# Patient Record
Sex: MALE | Race: WHITE | NOT HISPANIC OR LATINO | Employment: OTHER | URBAN - NONMETROPOLITAN AREA
[De-identification: names, ages, dates, MRNs, and addresses within clinical notes are randomized per-mention and may not be internally consistent; named-entity substitution may affect disease eponyms.]

---

## 2023-02-07 ENCOUNTER — OFFICE VISIT (OUTPATIENT)
Dept: INTERNAL MEDICINE | Facility: CLINIC | Age: 73
End: 2023-02-07
Payer: MEDICARE

## 2023-02-07 ENCOUNTER — LAB (OUTPATIENT)
Dept: LAB | Facility: HOSPITAL | Age: 73
End: 2023-02-07
Payer: MEDICARE

## 2023-02-07 VITALS
DIASTOLIC BLOOD PRESSURE: 60 MMHG | BODY MASS INDEX: 17.41 KG/M2 | OXYGEN SATURATION: 99 % | HEART RATE: 52 BPM | SYSTOLIC BLOOD PRESSURE: 92 MMHG | WEIGHT: 131.4 LBS | HEIGHT: 73 IN | RESPIRATION RATE: 16 BRPM | TEMPERATURE: 97.1 F

## 2023-02-07 DIAGNOSIS — G47.00 INSOMNIA, UNSPECIFIED TYPE: ICD-10-CM

## 2023-02-07 DIAGNOSIS — K21.9 GASTROESOPHAGEAL REFLUX DISEASE WITHOUT ESOPHAGITIS: ICD-10-CM

## 2023-02-07 DIAGNOSIS — Z12.5 PROSTATE CANCER SCREENING: ICD-10-CM

## 2023-02-07 DIAGNOSIS — F41.9 ANXIETY AND DEPRESSION: ICD-10-CM

## 2023-02-07 DIAGNOSIS — R34 DECREASED URINE OUTPUT: ICD-10-CM

## 2023-02-07 DIAGNOSIS — F32.A ANXIETY AND DEPRESSION: ICD-10-CM

## 2023-02-07 DIAGNOSIS — E03.9 ACQUIRED HYPOTHYROIDISM: ICD-10-CM

## 2023-02-07 DIAGNOSIS — M19.042 ARTHRITIS OF BOTH HANDS: ICD-10-CM

## 2023-02-07 DIAGNOSIS — C15.9 SQUAMOUS CELL CARCINOMA, ESOPHAGUS: ICD-10-CM

## 2023-02-07 DIAGNOSIS — H53.9 VISUAL CHANGES: ICD-10-CM

## 2023-02-07 DIAGNOSIS — M19.041 ARTHRITIS OF BOTH HANDS: ICD-10-CM

## 2023-02-07 DIAGNOSIS — R13.10 DYSPHAGIA, UNSPECIFIED TYPE: Primary | ICD-10-CM

## 2023-02-07 LAB
ALBUMIN SERPL-MCNC: 4.4 G/DL (ref 3.5–5.2)
ALBUMIN/GLOB SERPL: 2.3 G/DL
ALP SERPL-CCNC: 58 U/L (ref 39–117)
ALT SERPL W P-5'-P-CCNC: 22 U/L (ref 1–41)
ANION GAP SERPL CALCULATED.3IONS-SCNC: 5 MMOL/L (ref 5–15)
AST SERPL-CCNC: 23 U/L (ref 1–40)
BILIRUB SERPL-MCNC: 0.4 MG/DL (ref 0–1.2)
BILIRUB UR QL STRIP: NEGATIVE
BUN SERPL-MCNC: 10 MG/DL (ref 8–23)
BUN/CREAT SERPL: 20.8 (ref 7–25)
CALCIUM SPEC-SCNC: 9.4 MG/DL (ref 8.6–10.5)
CHLORIDE SERPL-SCNC: 95 MMOL/L (ref 98–107)
CLARITY UR: ABNORMAL
CO2 SERPL-SCNC: 31 MMOL/L (ref 22–29)
COLOR UR: YELLOW
CREAT SERPL-MCNC: 0.48 MG/DL (ref 0.76–1.27)
DEPRECATED RDW RBC AUTO: 45.2 FL (ref 37–54)
EGFRCR SERPLBLD CKD-EPI 2021: 108.4 ML/MIN/1.73
ERYTHROCYTE [DISTWIDTH] IN BLOOD BY AUTOMATED COUNT: 12.9 % (ref 12.3–15.4)
GLOBULIN UR ELPH-MCNC: 1.9 GM/DL
GLUCOSE SERPL-MCNC: 92 MG/DL (ref 65–99)
GLUCOSE UR STRIP-MCNC: NEGATIVE MG/DL
HCT VFR BLD AUTO: 37.5 % (ref 37.5–51)
HGB BLD-MCNC: 13.1 G/DL (ref 13–17.7)
HGB UR QL STRIP.AUTO: NEGATIVE
KETONES UR QL STRIP: NEGATIVE
LEUKOCYTE ESTERASE UR QL STRIP.AUTO: NEGATIVE
MCH RBC QN AUTO: 34.2 PG (ref 26.6–33)
MCHC RBC AUTO-ENTMCNC: 34.9 G/DL (ref 31.5–35.7)
MCV RBC AUTO: 97.9 FL (ref 79–97)
NITRITE UR QL STRIP: NEGATIVE
PH UR STRIP.AUTO: 7.5 [PH] (ref 5–8)
PLATELET # BLD AUTO: 215 10*3/MM3 (ref 140–450)
PMV BLD AUTO: 8.4 FL (ref 6–12)
POTASSIUM SERPL-SCNC: 4.4 MMOL/L (ref 3.5–5.2)
PROT SERPL-MCNC: 6.3 G/DL (ref 6–8.5)
PROT UR QL STRIP: NEGATIVE
PSA SERPL-MCNC: 0.38 NG/ML (ref 0–4)
RBC # BLD AUTO: 3.83 10*6/MM3 (ref 4.14–5.8)
SODIUM SERPL-SCNC: 131 MMOL/L (ref 136–145)
SP GR UR STRIP: 1.01 (ref 1–1.03)
TSH SERPL DL<=0.05 MIU/L-ACNC: 3.27 UIU/ML (ref 0.27–4.2)
UROBILINOGEN UR QL STRIP: ABNORMAL
WBC NRBC COR # BLD: 3.66 10*3/MM3 (ref 3.4–10.8)

## 2023-02-07 PROCEDURE — 36415 COLL VENOUS BLD VENIPUNCTURE: CPT

## 2023-02-07 PROCEDURE — 84443 ASSAY THYROID STIM HORMONE: CPT

## 2023-02-07 PROCEDURE — 80053 COMPREHEN METABOLIC PANEL: CPT

## 2023-02-07 PROCEDURE — 85027 COMPLETE CBC AUTOMATED: CPT

## 2023-02-07 PROCEDURE — G0103 PSA SCREENING: HCPCS

## 2023-02-07 PROCEDURE — 81003 URINALYSIS AUTO W/O SCOPE: CPT

## 2023-02-07 PROCEDURE — 99204 OFFICE O/P NEW MOD 45 MIN: CPT | Performed by: NURSE PRACTITIONER

## 2023-02-07 RX ORDER — ATORVASTATIN CALCIUM 20 MG/1
20 TABLET, FILM COATED ORAL DAILY
COMMUNITY

## 2023-02-07 RX ORDER — CLONAZEPAM 0.5 MG/1
0.5 TABLET ORAL 2 TIMES DAILY PRN
COMMUNITY
End: 2023-02-07

## 2023-02-07 RX ORDER — VENLAFAXINE 75 MG/1
75 TABLET ORAL DAILY
COMMUNITY
End: 2023-03-22 | Stop reason: DRUGHIGH

## 2023-02-07 RX ORDER — LEVOTHYROXINE SODIUM 0.05 MG/1
50 TABLET ORAL DAILY
COMMUNITY

## 2023-02-07 RX ORDER — ZOLPIDEM TARTRATE 10 MG/1
10 TABLET ORAL NIGHTLY PRN
COMMUNITY
End: 2023-02-07 | Stop reason: SDUPTHER

## 2023-02-07 RX ORDER — DONEPEZIL HYDROCHLORIDE 5 MG/1
5 TABLET, FILM COATED ORAL NIGHTLY
COMMUNITY

## 2023-02-07 RX ORDER — AMLODIPINE BESYLATE 5 MG/1
5-10 TABLET ORAL DAILY
COMMUNITY

## 2023-02-07 RX ORDER — ZOLPIDEM TARTRATE 10 MG/1
10 TABLET ORAL NIGHTLY PRN
Qty: 30 TABLET | Refills: 3 | Status: SHIPPED | OUTPATIENT
Start: 2023-02-07

## 2023-02-07 NOTE — PROGRESS NOTES
CC: establish care     HPI     Fortino Dennis is a 72 y.o. male presents to [facility].     He has was diagnosed with squamous cell carcinoma of the esophagus. He underwent radiation and chemotherapy with lymph node excision in 2007. He was seeing Oncology every 3 months in [place]. 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 He continues to have difficulty swallowing and chewing. He eats soft foods. A feeding tube was placed in 2021 and he continues tube feedings about 6 times per day. A LINX reflux management system placed 2016 for GERD and has had several esophageal dilations in the past. He has a history of anxiety and depression. He was seen by neurology and underwent testing for memory issues. He has been on aricept for a year. He recently moved to [place] to be near his brother, he is grew up in the area.          ROS:  Review of Systems   Constitutional: Negative for fever.   HENT: Negative.    Eyes: Positive for visual disturbance.   Respiratory: Negative.    Cardiovascular: Negative.    Gastrointestinal: Negative.    Genitourinary: Positive for difficulty urinating. Negative for dysuria, flank pain, frequency and hematuria.   Musculoskeletal: Negative.    Skin: Negative.    Neurological: Positive for dizziness.   Psychiatric/Behavioral: Positive for dysphoric mood. Negative for sleep disturbance. The patient is nervous/anxious.           reports that he has never smoked. He has never used smokeless tobacco. He reports current alcohol use of about 3.0 standard drinks per week.    Current Outpatient Medications:   •  Acetaminophen (TYLENOL ARTHRITIS PAIN PO), Take  by mouth., Disp: , Rfl:   •  amLODIPine (NORVASC) 5 MG tablet, Take 5-10 mg by mouth Daily., Disp: , Rfl:   •  atorvastatin (LIPITOR) 20 MG tablet, Take 20 mg by mouth Daily., Disp: , Rfl:   •  donepezil (ARICEPT) 5 MG tablet, Take 5 mg by mouth Every Night., Disp: , Rfl:   •  levothyroxine (SYNTHROID, LEVOTHROID) 50 MCG  "tablet, Take 50 mcg by mouth Daily., Disp: , Rfl:   •  SENNA PO, Take  by mouth Daily., Disp: , Rfl:   •  venlafaxine (EFFEXOR) 75 MG tablet, Take 75 mg by mouth Daily., Disp: , Rfl:   •  zolpidem (Ambien) 10 MG tablet, Take 10 mg by mouth At Night As Needed for Sleep., Disp: , Rfl:     OBJECTIVE:  BP 92/60 (BP Location: Left arm, Patient Position: Sitting, Cuff Size: Adult)   Pulse 52   Temp 97.1 °F (36.2 °C) (Temporal)   Resp 16   Ht 185.4 cm (73\")   Wt 59.6 kg (131 lb 6.4 oz)   SpO2 99%   BMI 17.34 kg/m²    Physical Exam  Constitutional:       General: He is not in acute distress.     Appearance: He is ill-appearing.   Cardiovascular:      Rate and Rhythm: Normal rate and regular rhythm.      Heart sounds: Normal heart sounds.   Pulmonary:      Breath sounds: Normal breath sounds.   Abdominal:      General: Bowel sounds are normal.      Comments: Feeding tube in place   Neurological:      General: No focal deficit present.   Psychiatric:         Mood and Affect: Mood normal.         Behavior: Behavior normal.         Thought Content: Thought content normal.         Judgment: Judgment normal.         ASSESSMENT/PLAN:     Diagnoses and all orders for this visit:    1. Dysphagia, unspecified type (Primary)  2. Gastroesophageal reflux disease without esophagitis  -     Ambulatory Referral to Gastroenterology    3. Decreased urine output  -     CBC No Differential; Future  -     Comprehensive metabolic panel; Future  -     Urinalysis With Culture If Indicated - Urine, Clean Catch; Future  He mentions urinary output has been decreased, no burning or pain with urinating.     4. Prostate cancer screening  -     PSA SCREENING; Future  Will obtain PSA given urinary symptoms.   5. Visual changes  -     Ambulatory Referral to Ophthalmology  He has a history of cataracts and multiple eye surgeries. He continues to have some visual disturbance.     6. Acquired hypothyroidism  -     TSH; Future    7. Squamous cell " carcinoma, esophagus (HCC)  -     Ambulatory Referral to Oncology   Records requested.     8. Anxiety and depression  Continue effexor. Discussed long-term effects with benzos including memory issues. He will continue to use this sparingly.     9. Arthritis of both hands  -     Ambulatory Referral to Orthopedic Surgery  He was getting injections to wrist and hands every 3 months for arthritis.     10. Insomnia    Ambien refilled PDMP reviewed, no concerns.     An After Visit Summary was printed and given to the patient at discharge.  Return in about 6 weeks (around 3/21/2023) for Recheck, return in 1 week for BP recheck .          Celestina Huang, JONATHAN 2/7/2023   Electronically signed.

## 2023-02-09 ENCOUNTER — TELEPHONE (OUTPATIENT)
Dept: INTERNAL MEDICINE | Facility: CLINIC | Age: 73
End: 2023-02-09

## 2023-02-09 DIAGNOSIS — G47.00 INSOMNIA, UNSPECIFIED TYPE: ICD-10-CM

## 2023-02-09 RX ORDER — ZOLPIDEM TARTRATE 10 MG/1
10 TABLET ORAL NIGHTLY PRN
Qty: 30 TABLET | Refills: 3 | OUTPATIENT
Start: 2023-02-09

## 2023-02-09 NOTE — TELEPHONE ENCOUNTER
Caller: Fortino Dennis    Relationship: Self    Best call back number:  [phone]    Requested Prescriptions:   Requested Prescriptions     Pending Prescriptions Disp Refills   • zolpidem (Ambien) 10 MG tablet 30 tablet 3     Sig: Take 1 tablet by mouth At Night As Needed for Sleep.      Pharmacy where request should be sent:      [id number] [place] [facility] [address]  - [phone] [facility] [phone]   [phone]    Additional details provided by patient:  HAS ABOUT A 7 DAY SUPPLY LEFT    Does the patient have less than a 3 day supply:  [] Yes  [x] No    Would you like a call back once the refill request has been completed: [x] Yes [] No    If the office needs to give you a call back, can they leave a voicemail: [x] Yes [] No    Gamaliel Pérez Rep   02/09/23 09:13 CST

## 2023-02-14 ENCOUNTER — CLINICAL SUPPORT (OUTPATIENT)
Dept: INTERNAL MEDICINE | Facility: CLINIC | Age: 73
End: 2023-02-14
Payer: MEDICARE

## 2023-02-14 VITALS
RESPIRATION RATE: 16 BRPM | HEART RATE: 55 BPM | WEIGHT: 131 LBS | BODY MASS INDEX: 17.36 KG/M2 | HEIGHT: 73 IN | OXYGEN SATURATION: 98 % | DIASTOLIC BLOOD PRESSURE: 80 MMHG | SYSTOLIC BLOOD PRESSURE: 136 MMHG

## 2023-02-14 DIAGNOSIS — I10 HYPERTENSION, UNSPECIFIED TYPE: Primary | ICD-10-CM

## 2023-02-14 NOTE — PROGRESS NOTES
I have reviewed the notes, assessments, and/or procedures performed by Aaron Mason, I concur with her/his documentation of Fortino Dennis.

## 2023-02-14 NOTE — PROGRESS NOTES
"Chief complaint: F/u hypertension with BP Check    History:  Fortino Dennis is a 72 y.o. male who presents today for BP check for hypertension.    OBJECTIVE:  /80 (BP Location: Left arm, Patient Position: Sitting, Cuff Size: Adult) Comment: Manual  Pulse 55   Resp 16   Ht 185.4 cm (73\")   Wt 59.4 kg (131 lb)   SpO2 98%   BMI 17.28 kg/m²     Assessment/Plan    Diagnoses and all orders for this visit:    1. Hypertension, unspecified type (Primary)        An After Visit Summary was printed and given to the patient at discharge.  Follow up as scheduled. Sooner if problems arise.         Celestina Huang APRN  2/14/2023   "

## 2023-03-13 NOTE — PROGRESS NOTES
[facility] HEMATOLOGY & ONCOLOGY [address]  [facility] [address] [phone]    Patient Name: Fortino Dennis  Encounter Date: 03/15/2023  YOB: 1950  Patient Number: [id number]    Initial Note    REASON FOR CONSULTATION: Fortino Dennis is a pleasant 72 y.o. 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  He had undergone left neck dissection after neoadjuvant chemo with radiation, 33 fractions in [facility]. .  Details are not available.  He is seen with brother, Geovanny. History is obtained from patient. History is considered to be accurate.      HISTORY OF PRESENT ILLNESS: He presented with a left neck mass 05/2007 by Dr. Galicia.  He had undergone neoadjuvant chemo with radiation 33 fractions.   He had undergone left neck dissection.    Fine-needle aspiration 5/31/2007.    Pathology report 6/1/2007 from [facility] showed squamous cell carcinoma.    Colonoscopy by Dr. Imtiaz Summers 3/21/2014.  A few small mouth diverticula were found in the sigmoid colon.  The exam was otherwise without abnormality on direct and retroflexion views.    Pathology report 9/12/2017.  Gastric fundic mucosa and cardiac mucosa showed no dysplasia.    He had seen Dr. Fer Muhammad [facility] 9/26/2017 for gastroesophageal reflux disease, hiatal hernia and esophagitis.     Pathology report 10/20/2017 mediastinal mass excision.  Benign fibroadipose tissue.  8 small benign lymph nodes.    He had undergone endoscopy with dilatation and biopsy.  Findings showed normal duodenum.  Mild diffuse gastritis, biopsy taken from antrum and incisura.  Some apparent irregularity of the gastric cardia by the GE junction.  This was biopsied several times for pathology.  3 to 4 cm hiatal hernia.  GE junction at 43 cm.  Distal esophagitis class I or grade  A.    He had undergone placement of LINX antireflux device on 10/19/2017 by Dr. Fer Muhammad, [facility]. Patient referred to oncology.    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    He had undergone left segmental mandibulectomy.  Osteotomy and plate reconstruction of mandible.  Local flap reconstruction, oral cavity approximately 12 cm² dissection in the background of prior oral cancer treatment with radiation necrosis of mandible on 4/19/2021.     Pathology report 4/26/2021.  Acute osteomyelitis with bone resorption and involvement of the posterior surgical margin.    He had undergone laryngoscopy, esophagoscopy, flexible bronchoscopy and dilation of esophageal stricture 11/17/2021.    Patient seen by JONATHAN Schmidt 2/14/2023 for hypertension.  He continued to have chewing difficulty and swallowing difficulty.  He had required feeding tube in 2001.  Patient referred to oncology.    LABS    Lab Results - Last 18 Months   Lab Units 02/07/23  1137 07/27/22  0705   HEMOGLOBIN g/dL 13.1 13.9   HEMATOCRIT % 37.5 41.2   MCV fL 97.9* 99.8*   WBC 10*3/mm3 3.66 5.1   RDW % 12.9  --    MPV fL 8.4 8.1*   PLATELETS 10*3/mm3 215 228   NEUTROS ABS K/uL  --  3.83   LYMPHS ABS K/uL  --  0.31*   MONOS ABS K/uL  --  0.77   EOS ABS K/uL  --  0.15   BASOS ABS K/uL  --  0.05   NRBC   --  AUTO DIFF       Lab Results - Last 18 Months   Lab Units 02/07/23  1137 07/27/22  0705 01/05/22  0649 11/17/21  0610   GLUCOSE mg/dL 92  --   --   --    SODIUM mmol/L 131* 127* 137 135*   POTASSIUM mmol/L 4.4 4.4 4.1 4.2   TOTAL CO2 meq/L  --  27 29 29   CO2 mmol/L 31.0*  --   --   --    CHLORIDE mmol/L 95* 97* 102 101   ANION GAP mmol/L 5.0 3* 6 5*   CREATININE mg/dL 0.48* 0.50* 0.38* 0.44*   BUN mg/dL 10 14 10 10   BUN / CREAT RATIO  20.8 28* 26* 23   CALCIUM mg/dL 9.4 9.3 8.8 8.8   ALK PHOS U/L 58  --   --   --    TOTAL PROTEIN g/dL 6.3  --   --   --    ALT (SGPT) U/L 22  --   --   --    AST (SGOT) U/L  23  --   --   --    BILIRUBIN mg/dL 0.4  --   --   --    ALBUMIN g/dL 4.4  --   --   --    GLOBULIN gm/dL 1.9  --   --   --        No results for input(s): MSPIKE, KAPPALAMB, IGLFLC, URICACID, FREEKAPPAL, CEA, LDH, REFLABREPO in the last 29222 hours.    Lab Results - Last 18 Months   Lab Units 02/07/23  1137   TSH uIU/mL 3.270         PAST MEDICAL HISTORY:  ALLERGIES:  No Known Allergies  CURRENT MEDICATIONS:  Outpatient Encounter Medications as of 3/15/2023   Medication Sig Dispense Refill   • Acetaminophen (TYLENOL ARTHRITIS PAIN PO) Take  by mouth.     • amLODIPine (NORVASC) 5 MG tablet Take 1-2 tablets by mouth Daily.     • atorvastatin (LIPITOR) 20 MG tablet Take 1 tablet by mouth Daily.     • clonazePAM (KlonoPIN) 1 MG tablet Take 1 tablet by mouth 2 (Two) Times a Day As Needed.     • donepezil (ARICEPT) 5 MG tablet Take 1 tablet by mouth Every Night.     • levothyroxine (SYNTHROID, LEVOTHROID) 50 MCG tablet Take 1 tablet by mouth Daily.     • SENNA PO Take  by mouth Daily.     • venlafaxine (EFFEXOR) 75 MG tablet Take 1 tablet by mouth Daily.     • zolpidem (Ambien) 10 MG tablet Take 1 tablet by mouth At Night As Needed for Sleep. 30 tablet 3     No facility-administered encounter medications on file as of 3/15/2023.     ADULT ILLNESSES:  Patient Active Problem List   Diagnosis Code   • Hypertensive disorder I10     SURGERIES:  No past surgical history on file.  HEALTH MAINTENANCE ITEMS:  Health Maintenance Due   Topic Date Due   • COVID-19 Vaccine (1) Never done   • TDAP/TD VACCINES (1 - Tdap) Never done   • ZOSTER VACCINE (1 of 2) Never done   • Pneumococcal Vaccine 65+ (1 - PCV) Never done   • HEPATITIS C SCREENING  Never done   • ANNUAL WELLNESS VISIT  Never done   • LIPID PANEL  Never done       <no information>  Last Completed Colonoscopy          COLORECTAL CANCER SCREENING (COLONOSCOPY - Every 10 Years) Next due on 9/17/2031 09/17/2021  Outside Procedure: [place] COLORECTAL SCRN; HI RISK IND     "03/21/2014  SCANNED - COLONOSCOPY              Immunization History   Administered Date(s) Administered   • Influenza, Unspecified 10/11/2022     Last Completed Mammogram     This patient has no relevant Health Maintenance data.            FAMILY HISTORY:  Family History   Problem Relation Age of Onset   • Cancer Mother    • Hypertension Mother    • Dementia Mother    • Colon cancer Mother    • Colon polyps Neg Hx      SOCIAL HISTORY:  Social History     Socioeconomic History   • Marital status: Single   Tobacco Use   • Smoking status: Never   • Smokeless tobacco: Never   Vaping Use   • Vaping Use: Never used   Substance and Sexual Activity   • Alcohol use: Yes     Alcohol/week: 3.0 standard drinks     Types: 3 Cans of beer per week     Comment: daily beers   • Drug use: Not Currently   • Sexual activity: Not Currently     Partners: Female       REVIEW OF SYSTEMS:  Review of Systems   Constitutional: Positive for fatigue. Negative for fever and unexpected weight loss.        \"Just old age.\"   HENT: Negative for congestion and mouth sores.         \"Still have chewing issues.\"   Eyes: Negative for discharge and redness.   Respiratory: Negative for cough, shortness of breath and wheezing.    Cardiovascular: Negative for chest pain and palpitations.   Gastrointestinal: Negative for abdominal pain, nausea and vomiting.   Endocrine: Negative for polydipsia and polyphagia.   Genitourinary: Negative for difficulty urinating, dysuria and flank pain.   Musculoskeletal: Negative for gait problem and joint swelling.   Skin: Positive for pallor.   Allergic/Immunologic: Negative for food allergies.   Neurological: Negative for speech difficulty, weakness and confusion.   Hematological: Negative for adenopathy. Does not bruise/bleed easily.   Psychiatric/Behavioral: Negative for agitation and hallucinations. The patient is not nervous/anxious.        /64   Pulse 63   Temp 97.3 °F (36.3 °C)   Resp 18   Ht 185.4 cm (73\")  "  Wt 62.6 kg (138 lb)   SpO2 98%   BMI 18.21 kg/m²  Body surface area is 1.84 meters squared.  Pain Score    03/15/23 1031   PainSc: 0-No pain       Physical Exam:  Physical Exam  Vitals reviewed.   Constitutional:       General: He is not in acute distress.  HENT:      Head: Normocephalic and atraumatic.   Eyes:      General: No scleral icterus.  Neck:      Comments: Scars, bilateral neck.  Cardiovascular:      Rate and Rhythm: Normal rate.   Pulmonary:      Effort: No respiratory distress.      Breath sounds: No wheezing or rales.   Abdominal:      General: Bowel sounds are normal.      Palpations: Abdomen is soft.      Tenderness: There is no abdominal tenderness.      Comments: +PEG.    Musculoskeletal:         General: No swelling.      Cervical back: Neck supple.   Skin:     General: Skin is warm.      Coloration: Skin is pale.   Neurological:      Mental Status: He is alert and oriented to person, place, and time.   Psychiatric:         Mood and Affect: Mood normal.         Behavior: Behavior normal.         Thought Content: Thought content normal.         Judgment: Judgment normal.         Fortino KEANE Sonny reports a pain score of 0.           ASSESSMENT:  1.  Squamous cell carcinoma, base of the tongue.  Diagnosed in 2007.  AJCC stage: Unknown.  Treatment status: Post neck dissection and adjuvant radiation.  2.  Performance status of 1.  3.  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  4.  Dysphagia.  On tube feeding since 2001.      PLAN:  1.   regarding the reason for the referral  2.   regarding role of surveillance for carcinoma base of tongue diagnosed in 2007.  He had completed neoadjuvant chemo with radiation, 33 fractions in 2007.   3.  Blood for CBC with differential, TSH and CMP.  4.  Keep appointment with ENT for surveillance.  5.  Keep appointment with JONATHAN Reynolds from GI.  6.  Order CT neck and chest, squamous cell carcinoma base of tongue.  7.  Continue  care per primary care physician at the specialist  8.  Plan of care discussed with patient and brother.  Understand expressed with patient agreeable to proceed  9.  Advance Care Planning   ACP discussion was held with the patient during this visit. Patient has an advance directive (not in EMR), copy requested.    10.  He will be seen every 12 months.  11.  Return to office in 12 months with CBC with differential, CMP and TSH.      I have reviewed the assessment and plan and verified the accuracy of it. No changes to assessment and plan since the information was documented. Chandler Gallardo MD 03/15/23       I spent 62 total minutes, face-to-face, caring for Fortino arenas.  Greater than 50% of this time involved counseling and/or coordination of care as documented within this note regarding the patient's illness(es), pros and cons of various treatment options, instructions and/or risk reduction.        (Channing Mcmanus MD)  (JONATHAN Reynolds)  (JONATHAN Schmidt)

## 2023-03-14 ENCOUNTER — OFFICE VISIT (OUTPATIENT)
Dept: GASTROENTEROLOGY | Facility: CLINIC | Age: 73
End: 2023-03-14
Payer: MEDICARE

## 2023-03-14 VITALS
BODY MASS INDEX: 18.29 KG/M2 | DIASTOLIC BLOOD PRESSURE: 78 MMHG | SYSTOLIC BLOOD PRESSURE: 126 MMHG | HEIGHT: 73 IN | OXYGEN SATURATION: 97 % | WEIGHT: 138 LBS | HEART RATE: 61 BPM | TEMPERATURE: 98 F

## 2023-03-14 DIAGNOSIS — R13.13 PHARYNGEAL DYSPHAGIA: ICD-10-CM

## 2023-03-14 DIAGNOSIS — C14.0 PHARYNGEAL CANCER: Primary | ICD-10-CM

## 2023-03-14 PROCEDURE — 1160F RVW MEDS BY RX/DR IN RCRD: CPT | Performed by: NURSE PRACTITIONER

## 2023-03-14 PROCEDURE — 99213 OFFICE O/P EST LOW 20 MIN: CPT | Performed by: NURSE PRACTITIONER

## 2023-03-14 PROCEDURE — 1159F MED LIST DOCD IN RCRD: CPT | Performed by: NURSE PRACTITIONER

## 2023-03-14 PROCEDURE — 3078F DIAST BP <80 MM HG: CPT | Performed by: NURSE PRACTITIONER

## 2023-03-14 PROCEDURE — 3074F SYST BP LT 130 MM HG: CPT | Performed by: NURSE PRACTITIONER

## 2023-03-14 RX ORDER — CLONAZEPAM 1 MG/1
1 TABLET ORAL 2 TIMES DAILY PRN
COMMUNITY
End: 2023-03-22

## 2023-03-14 NOTE — PROGRESS NOTES
"Chief Complaint   Patient presents with   • GI Problem     Having problems swallowing        PCP: Celestina Ortez APRN  REFER: Celestina Ortez, *    Subjective     HPI    Fortino Dennis referred to office for complain of dysphagia.  He has a history of pharyngeal cancer and is s/p chemo and radiation.    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  Dental implants 5/2021.   He has a LINX reflux mgmt system placed in 2016 for control of GERD related symptoms.  Today, Fortino Dennis denies abdominal pain, no signs of GI blood loss.  Bowels move without difficulty.  GERD related symptoms controlled.   PEG tube replaced last year (surgical placement per patient).  He does take food/liquid by mouth and uses PEG for supplemental.    He has undergone dysphagia therapy in past for treatment of pharyngeal dysphagia.  Fortino Dennis presents today to establish care (recently moved to [facility]) and set up dysphagia therapy.  He reports difficulty swallowing similar to what he has experienced in past.  He complain of pain to his hands (referral to [facility] by PCP)    CARE EVERYWHERE   \"This 72 year old male is followed by Dr. Gailcia for a history of T1 pharyngeal squamous cell carcinoma, for which neck dissection and chemoradiation was performed in 2007. Following completion of his treatment, he experienced dysphagia, for which he participated in 1 course of dysphagia therapy with use of neuromuscular electrical stimulation in 2008. Endoscopic examination of swallowing (FEES) 11/03/08 revealed a mild pharyngeal dysphagia and he was discharged from dysphagia therapy cleared for a soft diet. He has done well from the oncologic perspective, with no evidence of recurrence in the head and neck region since that time. FEES 5/18/17 indicated a decline in swallow function, and he reinitiated dysphagia therapy for 2 full courses. The pt. developed mandibular bone erosion and reconstruction " "of the mandible, and right fibula osteocutaneous free flap was performed 05/19/21. Pathology was osteomyelitis and osteonecrosis with benign lymph nodes. He completed IV antibiotics and hyperbaric oxygen therapy. FEES 09/24/21 revealed a moderate-severe pharyngeal dysphagia. He was cleared for a soft diet with intake modifications and re-initiated therapy with NMES. He is followed by Dr. Pearce for dental rehabilitation and is awaiting permanent dentition pending an improved oral aperture. He was placed on the Therabite therapeutic protocol. Pt. has completed multiple courses of dysphagia therapy and had multiple esophageal dilations with most recent being on 7/27/22 (52 Fr). His recent surveillance scans on 7/27/22 were unremarkable. FEES 08/16/22 revealed slightly worsening moderate pharyngeal dysphagia due to generalized weakness from a recent URI and he was cleared to continue a soft diet with intake modifications. Pt. has completed another course of dysphagia therapy. FEES 10/03/22 revealed an improving moderate pharyngeal dysphagia. Pt. is cleared to continue a soft, cohesive diet with intake modifications. Pt. was advised to follow-up with the Neurologist as scheduled and continue office based dysphagia therapy via NMES. He has completed another course. According to the pt, he has had increased left jaw pain and is scheduled for a CT scan in the near future. FEES 11/21/22 revealed a stable moderate pharyngeal dysphagia. He is cleared to continue a soft, cohesive diet with intake modifications. Pt. was advised to f/u w/ Neurologist as scheduled. He is agreeable to continue office-based dysphagia therapy via NMES. FEESST p 15 txs. Pt seen today for his scheduled dysphagia therapy in light of presenting history and current status.\"    ENDOSCOPY, INT (09/11/2017)    SCANNED - COLONOSCOPY (03/21/2014)-diverticulosis (7 years)    Past Medical History:   Diagnosis Date   • Acquired hypothyroidism    • Anxiety    • " Arthritis    • Depression    • Essential hypertension    • GERD (gastroesophageal reflux disease)    • Hiatal hernia    • Hyperlipidemia    • Hypertension    • Memory problem     aricept   • Mixed hyperlipidemia    • Osteonecrosis of jaw (HCC)    • Throat cancer (HCC)    • Thyroid disorder        History reviewed. No pertinent surgical history.    Outpatient Medications Marked as Taking for the 3/14/23 encounter (Office Visit) with Varinder Soliman APRN   Medication Sig Dispense Refill   • Acetaminophen (TYLENOL ARTHRITIS PAIN PO) Take  by mouth.     • amLODIPine (NORVASC) 5 MG tablet Take 1-2 tablets by mouth Daily.     • atorvastatin (LIPITOR) 20 MG tablet Take 1 tablet by mouth Daily.     • clonazePAM (KlonoPIN) 1 MG tablet Take 1 tablet by mouth 2 (Two) Times a Day As Needed.     • donepezil (ARICEPT) 5 MG tablet Take 1 tablet by mouth Every Night.     • levothyroxine (SYNTHROID, LEVOTHROID) 50 MCG tablet Take 1 tablet by mouth Daily.     • SENNA PO Take  by mouth Daily.     • venlafaxine (EFFEXOR) 75 MG tablet Take 1 tablet by mouth Daily.     • zolpidem (Ambien) 10 MG tablet Take 1 tablet by mouth At Night As Needed for Sleep. 30 tablet 3       No Known Allergies    Social History     Socioeconomic History   • Marital status: Single   Tobacco Use   • Smoking status: Never   • Smokeless tobacco: Never   Vaping Use   • Vaping Use: Never used   Substance and Sexual Activity   • Alcohol use: Yes     Alcohol/week: 3.0 standard drinks     Types: 3 Cans of beer per week     Comment: daily beers   • Drug use: Not Currently   • Sexual activity: Not Currently     Partners: Female       Review of Systems   Constitutional: Positive for fatigue. Negative for unexpected weight change.   HENT: Positive for trouble swallowing.    Respiratory: Negative for shortness of breath.    Cardiovascular: Negative for chest pain.   Gastrointestinal: Negative for abdominal pain and anal bleeding.   Musculoskeletal: Positive for  "arthralgias.       Objective     Vitals:    03/14/23 1052   BP: 126/78   Pulse: 61   Temp: 98 °F (36.7 °C)   SpO2: 97%   Weight: 62.6 kg (138 lb)   Height: 185.4 cm (73\")     Body mass index is 18.21 kg/m².    Physical Exam  Constitutional:       Appearance: Normal appearance. He is well-developed.   Eyes:      General: No scleral icterus.  Cardiovascular:      Rate and Rhythm: Regular rhythm.      Heart sounds: Normal heart sounds. No murmur heard.  Pulmonary:      Effort: Pulmonary effort is normal. No accessory muscle usage.      Breath sounds: Normal breath sounds.   Abdominal:      General: Bowel sounds are normal. There is no distension.      Palpations: Abdomen is soft. There is no mass.      Tenderness: There is no abdominal tenderness. There is no guarding or rebound.      Comments: PEG tube   Skin:     General: Skin is warm and dry.      Coloration: Skin is pale. Skin is not jaundiced.   Neurological:      Mental Status: He is alert.   Psychiatric:         Behavior: Behavior is cooperative.         Imaging Results (Most Recent)     None          Body mass index is 18.21 kg/m².    Assessment & Plan     Diagnoses and all orders for this visit:    1. Pharyngeal cancer (HCC) (Primary)  -     Ambulatory Referral to ENT (Otolaryngology)    2. Pharyngeal dysphagia         * Surgery not found *    Patient complain of not GI related and needs to establish with ENT (hopefully sooner than later).    I explained to Fortino Dennis we are more than happy to help him with any GI needs that may develop in the future.    Appointment with Dr Gallardo scheduled for 3/15/23    I have advised him to contact [facility] to follow up on referral for his appointment (complain of hand pain)      Varinder Soliman, APRN  03/16/23          There are no Patient Instructions on file for this visit.    "

## 2023-03-15 ENCOUNTER — PATIENT ROUNDING (BHMG ONLY) (OUTPATIENT)
Dept: ONCOLOGY | Facility: CLINIC | Age: 73
End: 2023-03-15
Payer: MEDICARE

## 2023-03-15 ENCOUNTER — CONSULT (OUTPATIENT)
Dept: ONCOLOGY | Facility: CLINIC | Age: 73
End: 2023-03-15
Payer: MEDICARE

## 2023-03-15 ENCOUNTER — LAB (OUTPATIENT)
Dept: LAB | Facility: HOSPITAL | Age: 73
End: 2023-03-15
Payer: MEDICARE

## 2023-03-15 VITALS
HEIGHT: 73 IN | BODY MASS INDEX: 18.29 KG/M2 | OXYGEN SATURATION: 98 % | RESPIRATION RATE: 18 BRPM | SYSTOLIC BLOOD PRESSURE: 136 MMHG | TEMPERATURE: 97.3 F | HEART RATE: 63 BPM | DIASTOLIC BLOOD PRESSURE: 64 MMHG | WEIGHT: 138 LBS

## 2023-03-15 DIAGNOSIS — R53.0 NEOPLASTIC MALIGNANT RELATED FATIGUE: ICD-10-CM

## 2023-03-15 DIAGNOSIS — E03.9 ACQUIRED HYPOTHYROIDISM: Primary | ICD-10-CM

## 2023-03-15 DIAGNOSIS — C01 PRIMARY CANCER OF BASE OF TONGUE: ICD-10-CM

## 2023-03-15 DIAGNOSIS — C01 PRIMARY CANCER OF BASE OF TONGUE: Primary | ICD-10-CM

## 2023-03-15 DIAGNOSIS — E03.9 ACQUIRED HYPOTHYROIDISM: ICD-10-CM

## 2023-03-15 LAB
ALBUMIN SERPL-MCNC: 4.5 G/DL (ref 3.5–5.2)
ALBUMIN/GLOB SERPL: 2 G/DL
ALP SERPL-CCNC: 74 U/L (ref 39–117)
ALT SERPL W P-5'-P-CCNC: 24 U/L (ref 1–41)
ANION GAP SERPL CALCULATED.3IONS-SCNC: 9 MMOL/L (ref 5–15)
AST SERPL-CCNC: 21 U/L (ref 1–40)
BASOPHILS # BLD AUTO: 0.02 10*3/MM3 (ref 0–0.2)
BASOPHILS NFR BLD AUTO: 0.5 % (ref 0–1.5)
BILIRUB SERPL-MCNC: 0.6 MG/DL (ref 0–1.2)
BUN SERPL-MCNC: 11 MG/DL (ref 8–23)
BUN/CREAT SERPL: 23.9 (ref 7–25)
CALCIUM SPEC-SCNC: 9.6 MG/DL (ref 8.6–10.5)
CHLORIDE SERPL-SCNC: 96 MMOL/L (ref 98–107)
CO2 SERPL-SCNC: 29 MMOL/L (ref 22–29)
CREAT SERPL-MCNC: 0.46 MG/DL (ref 0.76–1.27)
DEPRECATED RDW RBC AUTO: 48.3 FL (ref 37–54)
EGFRCR SERPLBLD CKD-EPI 2021: 109.8 ML/MIN/1.73
EOSINOPHIL # BLD AUTO: 0.06 10*3/MM3 (ref 0–0.4)
EOSINOPHIL NFR BLD AUTO: 1.5 % (ref 0.3–6.2)
ERYTHROCYTE [DISTWIDTH] IN BLOOD BY AUTOMATED COUNT: 12.9 % (ref 12.3–15.4)
GLOBULIN UR ELPH-MCNC: 2.3 GM/DL
GLUCOSE SERPL-MCNC: 91 MG/DL (ref 65–99)
HCT VFR BLD AUTO: 41.4 % (ref 37.5–51)
HGB BLD-MCNC: 13.5 G/DL (ref 13–17.7)
IMM GRANULOCYTES # BLD AUTO: 0.01 10*3/MM3 (ref 0–0.05)
IMM GRANULOCYTES NFR BLD AUTO: 0.3 % (ref 0–0.5)
LYMPHOCYTES # BLD AUTO: 0.46 10*3/MM3 (ref 0.7–3.1)
LYMPHOCYTES NFR BLD AUTO: 11.8 % (ref 19.6–45.3)
MCH RBC QN AUTO: 32.9 PG (ref 26.6–33)
MCHC RBC AUTO-ENTMCNC: 32.6 G/DL (ref 31.5–35.7)
MCV RBC AUTO: 101 FL (ref 79–97)
MONOCYTES # BLD AUTO: 0.66 10*3/MM3 (ref 0.1–0.9)
MONOCYTES NFR BLD AUTO: 16.9 % (ref 5–12)
NEUTROPHILS NFR BLD AUTO: 2.7 10*3/MM3 (ref 1.7–7)
NEUTROPHILS NFR BLD AUTO: 69 % (ref 42.7–76)
NRBC BLD AUTO-RTO: 0 /100 WBC (ref 0–0.2)
PLATELET # BLD AUTO: 180 10*3/MM3 (ref 140–450)
PMV BLD AUTO: 8 FL (ref 6–12)
POTASSIUM SERPL-SCNC: 4.1 MMOL/L (ref 3.5–5.2)
PROT SERPL-MCNC: 6.8 G/DL (ref 6–8.5)
RBC # BLD AUTO: 4.1 10*6/MM3 (ref 4.14–5.8)
SODIUM SERPL-SCNC: 134 MMOL/L (ref 136–145)
T4 FREE SERPL-MCNC: 1.21 NG/DL (ref 0.93–1.7)
TSH SERPL DL<=0.05 MIU/L-ACNC: 4.42 UIU/ML (ref 0.27–4.2)
WBC NRBC COR # BLD: 3.91 10*3/MM3 (ref 3.4–10.8)

## 2023-03-15 PROCEDURE — 80053 COMPREHEN METABOLIC PANEL: CPT

## 2023-03-15 PROCEDURE — 3078F DIAST BP <80 MM HG: CPT | Performed by: INTERNAL MEDICINE

## 2023-03-15 PROCEDURE — 85025 COMPLETE CBC W/AUTO DIFF WBC: CPT

## 2023-03-15 PROCEDURE — 1126F AMNT PAIN NOTED NONE PRSNT: CPT | Performed by: INTERNAL MEDICINE

## 2023-03-15 PROCEDURE — 1159F MED LIST DOCD IN RCRD: CPT | Performed by: INTERNAL MEDICINE

## 2023-03-15 PROCEDURE — 84443 ASSAY THYROID STIM HORMONE: CPT

## 2023-03-15 PROCEDURE — 3075F SYST BP GE 130 - 139MM HG: CPT | Performed by: INTERNAL MEDICINE

## 2023-03-15 PROCEDURE — 36415 COLL VENOUS BLD VENIPUNCTURE: CPT

## 2023-03-15 PROCEDURE — 84439 ASSAY OF FREE THYROXINE: CPT

## 2023-03-15 PROCEDURE — 99205 OFFICE O/P NEW HI 60 MIN: CPT | Performed by: INTERNAL MEDICINE

## 2023-03-15 NOTE — PROGRESS NOTES
March 15, 2023    Hello, may I speak with Fortino Medeiros?    My name is CLAUDINE      I am  with [facility] [address]  [facility] [facility] [address] [phone].    Before we get started may I verify your date of birth? 1950    Daniel MEDEIROS. My name is Claudine and I am calling from [facility]   DR. CHRISTOPHER’S office. I wanted to welcome you to our practice and ensure that your first visit went smoothly. If you have any questions or concerns, feel free to reach out to our practice manager Crystal directly at [phone]. Thank you, and have a great day!

## 2023-03-16 ENCOUNTER — TELEPHONE (OUTPATIENT)
Dept: OTOLARYNGOLOGY | Facility: CLINIC | Age: 73
End: 2023-03-16
Payer: MEDICARE

## 2023-03-16 ENCOUNTER — TELEPHONE (OUTPATIENT)
Dept: INTERNAL MEDICINE | Facility: CLINIC | Age: 73
End: 2023-03-16
Payer: MEDICARE

## 2023-03-16 DIAGNOSIS — E03.9 ACQUIRED HYPOTHYROIDISM: Primary | ICD-10-CM

## 2023-03-16 NOTE — TELEPHONE ENCOUNTER
I spoke ti patient, gave details of appt with Dr Card on 3-20-23 at 9:45. He voiced understanding

## 2023-03-18 NOTE — PROGRESS NOTES
Alli Card Jr, MD  [facility] EAR NOSE & THROAT  [address], [address]  [facility] [facility] [facility] [address]  Fax [phone]  Phone [phone]      Visit Type: NEW PATIENT   Chief Complaint   Patient presents with   • Difficulty Swallowing   • Cancer Surveillance     HCC pharyngeal CA        HPI   Accompanied by: Friend  He complains of throat cancer.   He had intial surgery in 2007. No cancer since then. He has mandibular reconstruction on 2021. He has had swallowing issues since then.  He was seeing MD elsewhere.  He moved here in 2022.  He has moved here permanently. Surgery in [facility].  He had free flap reconstruction for bone necrosis. He says taste changes. He had XRT in 2007 with chemo. Since then was able to chew and swallow.  Since Jaw surgery, change in taste and difficulty swallowing.  Smoke- none  Drink- 4 daily for stress relief.  CT scan ordered   Has had e stim and slp in [place] none here.  He is cold all the time  He is losing weight  Says he takes in 1500 caterina daily    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  Initial staging: T1N0M0  Re-staging: none  Therapy: Surgery, RMND, XRT, Chemo  Completion therapy: 2007  Free flap reconstruction of the mandible 2021 for tomorrow    Past Medical History:   Diagnosis Date   • Acquired hypothyroidism    • Anxiety    • Arthritis    • Depression    • Essential hypertension    • GERD (gastroesophageal reflux disease)    • Hiatal hernia    • Hyperlipidemia    • Hypertension    • Memory problem     aricept   • Mixed hyperlipidemia    • Osteonecrosis of jaw (HCC)    • Throat cancer (HCC)    • Thyroid disorder        History reviewed. No pertinent surgical history.    Family History: His family history includes Cancer in his mother; Colon cancer in his mother; Dementia in his mother; Hypertension in his mother.     Social History: He  reports that he has never smoked. He has  never used smokeless tobacco. He reports current alcohol use of about 3.0 standard drinks per week. He reports that he does not currently use drugs.    Home Medications:  Acetaminophen, Senna, amLODIPine, atorvastatin, clonazePAM, donepezil, levothyroxine, venlafaxine, and zolpidem    Allergies:  He has No Known Allergies.       Vital Signs:   Temp:  [98.2 °F (36.8 °C)] 98.2 °F (36.8 °C)  Heart Rate:  [68] 68  BP: (148)/(90) 148/90  ENT Physical Exam  Constitutional  Appearance: patient appears well-developed and well-nourished,  Communication/Voice: communication appropriate for developmental age; vocal quality normal;  Head and Face  Appearance: head appears normal, face appears normal and face appears atraumatic;  Palpation: facial palpation normal;  Salivary: glands normal;  Ear  Hearing: intact;  Auricles: bilateral auricles normal;  External Mastoids: right external mastoid normal; left external mastoid normal;  Ear Canals: bilateral ear canals normal;  Tympanic Membranes: bilateral tympanic membranes normal;  Nose  External Nose: nares patent bilaterally; external nose normal;  Internal Nose: nasal mucosa normal; Nasal mucosa comments: R side mid synecia, very thin   nasal septal deviation present; deviation is to the left, septal deviation is severe; Septum comments: R to L mid severe   bilateral inferior turbinates edematous and erythematous;  Oral Cavity/Oropharynx  Lips: normal;  Teeth: missing teeth (L mandible with impnat) noted;  Gums: gingival recession present; Gum comments: gingivits  Tongue: surface is coated and smooth; Oral Tongue comments: very dry  Oral mucosa: mucous membranes dry; buccal mucosa Buccal Mucosa comments: thickened secretions  Hard palate: normal;  Soft palate: normal;  Tonsils: bilateral tonsils 1+, Tonsil comments: dry and coated  Neck  Neck: scars (Left neck dissection scar, well-healed) present; neck asymmetric (Status post left neck dissection); no neck mass  present;  Thyroid: no thyroid mass present;  Neck comments: Left neck, well-healed  Very atrophic musculature of the right neck  Brawny induration left greater than right  Poor laryngeal elevation  Respiratory  Inspection: breathing unlabored; normal breathing rate;  Cardiovascular  Inspection: extremities are warm and well perfused; no peripheral edema present;  Lymphatic  Palpation: no cervical adenopathy noted;  Neurovestibular  Mental Status: alert and oriented;  Psychiatric: mood normal; affect is appropriate;  Drawings       Flexible laryngoscopy    Date/Time: 3/20/2023 9:54 AM  Performed by: Alli Card Jr., MD  Authorized by: Alli Card Jr., MD     Consent:     Consent obtained:  Verbal    Consent given by:  Patient    Alternatives discussed:  No treatment  Anesthesia (see MAR for exact dosages):     Anesthesia method:  Topical application    Topical anesthetic:  Tetracaine  Procedure details:     Indications: oncologic surveillance      Medication:  Afrin    Instrument: flexible fiberoptic laryngoscope      Scope location: right nare    Sinus/ Nasopharynx:     Right nasopharynx: patent and inflammation      Left nasopharynx: patent and inflammation      Right eustachian tube: patent      Left eustachian tube: inflammation, patent and inflammation    Oropharynx/ Supraglottis:     Oropharynx: inflammation and retained secretions      Vallecula: inflammation      Base of tongue: lingual tonsillar hypertrophy (Mild) and inflammation      Epiglottis: inflammation and retroflexed       comment:  Twisted clockwise approximately 45 degrees, poor retroflexion  Larynx/ Hypopharynx:     Arytenoids: inflammation and interarytenoid edema       comment:  Pooling with thick secretions posteriorly arytenoids    Hypopharynx: inflammation      Pyriform sinus: inflammation      False vocal cords: inflammation      True vocal cords: inflammation      True vocal cords: no immobility    Post-procedure  details:     Patient tolerance of procedure:  Tolerated well  Comments:      Poor laryngeal elevation, poor retroflexion of the epiglottis  Pooled secretions behind the arytenoids  Inflammation of the upper aerodigestive tract from the nasopharynx into the hypopharynx and in the supraglottis.  No evidence of penetration of secretions       Result Review    RESULTS REVIEW    I have reviewed the patients old records in the chart.   I have reviewed the patients old records in the chart.  The following results/records were reviewed:   Referral to Otolaryngology for Pharyngeal cancer (HCC) (03/15/2023)  CT Soft Tissue Neck With Contrast (03/15/2023 11:06)-pending  Progress Notes by Varinder Soliman APRN (03/14/2023 10:30)  Progress Notes by Chandler Gallardo MD (03/15/2023 10:15)    Assessment & Plan    Diagnoses and all orders for this visit:    1. Moderate protein-calorie malnutrition (HCC) (Primary)  Comments:  Poor caloric intake, dysphagia  Orders:  -     FL Video Swallow With Speech Single Contrast  -     Ambulatory Referral to Speech Therapy  -     Ambulatory Referral to Social Care Services (Amb Case Mgmt)    2. Oral phase dysphagia  Comments:  With poor opening  Orders:  -     FL Video Swallow With Speech Single Contrast  -     Ambulatory Referral to Speech Therapy  -     Ambulatory Referral to Social Care Services (Amb Case Mgmt)  -     Flexible laryngoscopy    3. Oropharyngeal dysphagia  Comments:  Severe, related to prior therapy, prior surgery  Orders:  -     FL Video Swallow With Speech Single Contrast  -     Ambulatory Referral to Speech Therapy  -     Ambulatory Referral to Social Care Services (Amb Case Mgmt)  -     Flexible laryngoscopy    4. Squamous cell carcinoma of pharynx (HCC)  Comments:  Left side, no evidence of disease  Orders:  -     FL Video Swallow With Speech Single Contrast  -     Ambulatory Referral to Speech Therapy  -     NM PET/CT Skull Base to Mid Thigh  -     Ambulatory Referral to  Nutrition Services  -     Ambulatory Referral to Social Care Services (Amb Case Mgmt)  -     Flexible laryngoscopy    5. 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 Weight loss, abnormal  Comments:  Related to insufficient caloric intake  Orders:  -     Ambulatory Referral to Social Care Services (Amb Case Mgmt)  -     Flexible laryngoscopy    7. Synechia, anterior, right  Comments:  Right side septum to inferior turbinate, very small    8. Mucositis due to radiation therapy  Comments:  Severe       Conservative management.  Patient has a complicated past medical history.  He has had cancer therapy in 2007.  He appears to be cancer free with no evidence of disease at this point in time.  He developed osteoradionecrosis in 2021.  Since that time the patient has had worsening dysphagia.  He has not seen speech or had swallowing imaging.  He does have a CT of the neck ordered.  I feel the patient needs swallowing evaluation.  He also needs speech therapy.  My concern is that the patient has had therapy and has now developed severe scarring with decreased mobility of the larynx and pharynx.  He has not taking in enough calories, causing malnutrition and creased cold sensation.  He has no  here.  I will send the patient for case management and nutrition services.  He probably needs to increase his G-tube feeding.  I recommend he take in 2000 caterina a day.  He appears well-healed from his prior free flap to the left mandible.  Swallowing therapy, speech therapy consult  Modified barium swallow  PET scan to evaluate for cancer and osteoradionecrosis  Increase caloric intake to 2000  Social service consult for case management  Nutrition consult for caloric  needs  Refer to Dr. Sierra for weight loss and cold intolerance    My Chart:  Patient is using My Chart    Patient, Friend\ understand(s) and agree(s) with the treatment plan as described.    Return RTC after PET and MBS, for Recheck throat.      Alli Card Jr, MD   03/20/23  09:56 CDT

## 2023-03-20 ENCOUNTER — OFFICE VISIT (OUTPATIENT)
Dept: OTOLARYNGOLOGY | Facility: CLINIC | Age: 73
End: 2023-03-20
Payer: MEDICARE

## 2023-03-20 VITALS
TEMPERATURE: 98.2 F | SYSTOLIC BLOOD PRESSURE: 148 MMHG | HEART RATE: 68 BPM | DIASTOLIC BLOOD PRESSURE: 90 MMHG | HEIGHT: 73 IN | BODY MASS INDEX: 18.29 KG/M2 | WEIGHT: 138 LBS

## 2023-03-20 DIAGNOSIS — K12.33 MUCOSITIS DUE TO RADIATION THERAPY: ICD-10-CM

## 2023-03-20 DIAGNOSIS — R13.12 OROPHARYNGEAL DYSPHAGIA: ICD-10-CM

## 2023-03-20 DIAGNOSIS — E44.0 MODERATE PROTEIN-CALORIE MALNUTRITION: Primary | ICD-10-CM

## 2023-03-20 DIAGNOSIS — R63.4 WEIGHT LOSS, ABNORMAL: ICD-10-CM

## 2023-03-20 DIAGNOSIS — H21.511 SYNECHIA, ANTERIOR, RIGHT: ICD-10-CM

## 2023-03-20 DIAGNOSIS — C14.0 SQUAMOUS CELL CARCINOMA OF PHARYNX: ICD-10-CM

## 2023-03-20 DIAGNOSIS — C10.2 MALIGNANT NEOPLASM OF LATERAL WALL OF OROPHARYNX: ICD-10-CM

## 2023-03-20 DIAGNOSIS — R13.11 ORAL PHASE DYSPHAGIA: ICD-10-CM

## 2023-03-20 PROCEDURE — 31575 DIAGNOSTIC LARYNGOSCOPY: CPT | Performed by: OTOLARYNGOLOGY

## 2023-03-20 PROCEDURE — 1160F RVW MEDS BY RX/DR IN RCRD: CPT | Performed by: OTOLARYNGOLOGY

## 2023-03-20 PROCEDURE — 3080F DIAST BP >= 90 MM HG: CPT | Performed by: OTOLARYNGOLOGY

## 2023-03-20 PROCEDURE — 3077F SYST BP >= 140 MM HG: CPT | Performed by: OTOLARYNGOLOGY

## 2023-03-20 PROCEDURE — 1159F MED LIST DOCD IN RCRD: CPT | Performed by: OTOLARYNGOLOGY

## 2023-03-20 PROCEDURE — 99204 OFFICE O/P NEW MOD 45 MIN: CPT | Performed by: OTOLARYNGOLOGY

## 2023-03-20 NOTE — PATIENT INSTRUCTIONS
Speech therapy consulted  Nutrition consulted  Swallowing test ordered  PET scan ordered  Increase daily calories to 2000, using feeding tube if need be.     CONTACT INFORMATION:  The main office phone number is [phone]. For emergencies after hours and on weekends, this number will convert over to our answering service and the on call provider will answer. Please try to keep non emergent phone calls/ questions to office hours 9am-5pm Monday through Friday.     [employer]  As an alternative, you can sign up and use the Epic MyChart system for more direct and quicker access for non emergent questions/ problems.  [employer] allows you to send messages to your doctor, view your test results, renew your prescriptions, schedule appointments, and more. To sign up, go to [email or web address] and click on the Sign Up Now link in the New User? box. Enter your [employer] Activation Code exactly as it appears below along with the last four digits of your Social Security Number and your Date of Birth () to complete the sign-up process. If you do not sign up before the expiration date, you must request a new code.    [employer] Activation Code: Activation code not generated  Current [employer] Status: Active    If you have questions, you can email [email or web address] or call [phone] to talk to our [employer] staff. Remember, [employer] is NOT to be used for urgent needs. For medical emergencies, dial 911.

## 2023-03-21 ENCOUNTER — TELEPHONE (OUTPATIENT)
Dept: RADIATION ONCOLOGY | Facility: HOSPITAL | Age: 73
End: 2023-03-21
Payer: MEDICARE

## 2023-03-21 ENCOUNTER — HOSPITAL ENCOUNTER (OUTPATIENT)
Dept: CT IMAGING | Facility: HOSPITAL | Age: 73
Discharge: HOME | End: 2023-03-21
Admitting: INTERNAL MEDICINE
Payer: MEDICARE

## 2023-03-21 DIAGNOSIS — C01 PRIMARY CANCER OF BASE OF TONGUE: ICD-10-CM

## 2023-03-21 DIAGNOSIS — R53.0 NEOPLASTIC MALIGNANT RELATED FATIGUE: ICD-10-CM

## 2023-03-21 PROCEDURE — 25510000001 IOPAMIDOL 61 % SOLUTION: Performed by: INTERNAL MEDICINE

## 2023-03-21 PROCEDURE — 71260 CT THORAX DX C+: CPT

## 2023-03-21 PROCEDURE — 70491 CT SOFT TISSUE NECK W/DYE: CPT

## 2023-03-21 RX ADMIN — IOPAMIDOL 100 ML: 612 INJECTION, SOLUTION INTRAVENOUS at 17:27

## 2023-03-21 NOTE — TELEPHONE ENCOUNTER
Attempted to call Mr. Dennis. He did not answer and this writer left a voicemail. SW will attempt to caterina Mr. Dennis later in the week.

## 2023-03-22 ENCOUNTER — OFFICE VISIT (OUTPATIENT)
Dept: INTERNAL MEDICINE | Facility: CLINIC | Age: 73
End: 2023-03-22
Payer: MEDICARE

## 2023-03-22 VITALS
OXYGEN SATURATION: 98 % | HEIGHT: 73 IN | DIASTOLIC BLOOD PRESSURE: 74 MMHG | WEIGHT: 132.8 LBS | TEMPERATURE: 97.6 F | SYSTOLIC BLOOD PRESSURE: 120 MMHG | BODY MASS INDEX: 17.6 KG/M2 | HEART RATE: 54 BPM | RESPIRATION RATE: 16 BRPM

## 2023-03-22 DIAGNOSIS — E44.0 MODERATE PROTEIN-CALORIE MALNUTRITION: ICD-10-CM

## 2023-03-22 DIAGNOSIS — Z85.819 HISTORY OF PHARYNGEAL CANCER: Primary | ICD-10-CM

## 2023-03-22 DIAGNOSIS — F33.1 MODERATE EPISODE OF RECURRENT MAJOR DEPRESSIVE DISORDER: ICD-10-CM

## 2023-03-22 DIAGNOSIS — E03.9 ACQUIRED HYPOTHYROIDISM: ICD-10-CM

## 2023-03-22 DIAGNOSIS — K22.2 STENOSIS OF ESOPHAGUS: ICD-10-CM

## 2023-03-22 DIAGNOSIS — I10 PRIMARY HYPERTENSION: ICD-10-CM

## 2023-03-22 PROBLEM — R13.10 DYSPHAGIA: Status: ACTIVE | Noted: 2021-11-17

## 2023-03-22 PROBLEM — R13.10 DYSPHAGIA: Status: RESOLVED | Noted: 2021-11-17 | Resolved: 2023-03-22

## 2023-03-22 RX ORDER — VENLAFAXINE HYDROCHLORIDE 150 MG/1
150 CAPSULE, EXTENDED RELEASE ORAL DAILY
Qty: 90 CAPSULE | Refills: 1 | Status: SHIPPED | OUTPATIENT
Start: 2023-03-22

## 2023-03-22 NOTE — PATIENT INSTRUCTIONS
Medicare Wellness  Personal Prevention Plan of Service     Date of Office Visit:    Encounter Provider:  Fortino Sierra DO  Place of Service:  [facility] INTERNAL MEDICINE  Patient Name: Fortino Dennis  :  1950    As part of the Medicare Wellness portion of your visit today, we are providing you with this personalized preventive plan of services (PPPS). This plan is based upon recommendations of the [place] Preventive Services Task Force (USPSTF) and the Advisory Committee on Immunization Practices (ACIP).    This lists the preventive care services that should be considered, and provides dates of when you are due. Items listed as completed are up-to-date and do not require any further intervention.    Health Maintenance   Topic Date Due    TDAP/TD VACCINES (1 - Tdap) Never done    ZOSTER VACCINE (1 of 2) Never done    Pneumococcal Vaccine 65+ (1 - PCV) Never done    HEPATITIS C SCREENING  Never done    LIPID PANEL  Never done    ANNUAL WELLNESS VISIT  2024    COLORECTAL CANCER SCREENING  2031    COVID-19 Vaccine  Completed    INFLUENZA VACCINE  Completed       No orders of the defined types were placed in this encounter.      Return in about 3 months (around 2023) for Recheck.

## 2023-03-22 NOTE — PROGRESS NOTES
The ABCs of the Annual Wellness Visit  Initial Medicare Wellness Visit    Subjective     Fortino Dennis is a 72 y.o. male who presents for an Initial Medicare Wellness Visit.    The following portions of the patient's history were reviewed and   updated as appropriate: allergies, current medications, past family history, past medical history, past social history, past surgical history and problem list.     Compared to one year ago, the patient feels his physical   health is worse.    Compared to one year ago, the patient feels his mental   health is worse.    Recent Hospitalizations:  He was not admitted to the hospital during the last year.       Current Medical Providers:  Patient Care Team:  Fortino Sierra DO as PCP - General (Internal Medicine)  Jc Vargas DO as Consulting Physician (Gastroenterology)    Outpatient Medications Prior to Visit   Medication Sig Dispense Refill   • Acetaminophen (TYLENOL ARTHRITIS PAIN PO) Take  by mouth.     • amLODIPine (NORVASC) 5 MG tablet Take 1-2 tablets by mouth Daily.     • atorvastatin (LIPITOR) 20 MG tablet Take 1 tablet by mouth Daily.     • donepezil (ARICEPT) 5 MG tablet Take 1 tablet by mouth Every Night.     • levothyroxine (SYNTHROID, LEVOTHROID) 50 MCG tablet Take 1 tablet by mouth Daily.     • SENNA PO Take  by mouth Daily.     • zolpidem (Ambien) 10 MG tablet Take 1 tablet by mouth At Night As Needed for Sleep. 30 tablet 3   • clonazePAM (KlonoPIN) 1 MG tablet Take 1 tablet by mouth 2 (Two) Times a Day As Needed.     • venlafaxine (EFFEXOR) 75 MG tablet Take 1 tablet by mouth Daily.       No facility-administered medications prior to visit.       No opioid medication identified on active medication list. I have reviewed chart for other potential  high risk medication/s and harmful drug interactions in the elderly.          Aspirin is not on active medication list.  Aspirin use is not indicated based on review of current medical condition/s. Risk of  "harm outweighs potential benefits.  .    Patient Active Problem List   Diagnosis   • Primary hypertension   • Hyperlipidemia   • Stenosis of esophagus   • History of pharyngeal cancer   • Moderate protein-calorie malnutrition (HCC)   • Acquired hypothyroidism   • Moderate episode of recurrent major depressive disorder (HCC)     Advance Care Planning  Advance Directive is on file.  ACP discussion was held with the patient during this visit. Patient has an advance directive in EMR which is still valid.        Objective    Vitals:    03/22/23 1107   BP: 120/74   BP Location: Left arm   Patient Position: Sitting   Cuff Size: Adult   Pulse: 54   Resp: 16   Temp: 97.6 °F (36.4 °C)   SpO2: 98%   Weight: 60.2 kg (132 lb 12.8 oz)   Height: 185.4 cm (73\")     Estimated body mass index is 17.52 kg/m² as calculated from the following:    Height as of this encounter: 185.4 cm (73\").    Weight as of this encounter: 60.2 kg (132 lb 12.8 oz).    BMI is below normal parameters (malnutrition). Recommendations: referred for  related to tube feeding.      Does the patient have evidence of cognitive impairment?   Yes: on donepezil.          HEALTH RISK ASSESSMENT    Smoking Status:  Social History     Tobacco Use   Smoking Status Never   • Passive exposure: Never   Smokeless Tobacco Never     Alcohol Consumption:  Social History     Substance and Sexual Activity   Alcohol Use Yes   • Alcohol/week: 3.0 standard drinks   • Types: 3 Cans of beer per week    Comment: daily beers     Fall Risk Screen:    LILIAN Fall Risk Assessment was completed, and patient is at LOW risk for falls.Assessment completed on:3/22/2023    Depression Screen:   PHQ-2/PHQ-9 Depression Screening 3/22/2023   Little Interest or Pleasure in Doing Things 0-->not at all   Feeling Down, Depressed or Hopeless 0-->not at all   Trouble Falling or Staying Asleep, or Sleeping Too Much -   Feeling Tired or Having Little Energy -   Poor Appetite or Overeating - "   Feeling Bad about Yourself - or that You are a Failure or Have Let Yourself or Your Family Down -   Trouble Concentrating on Things, Such as Reading the Newspaper or Watching Television -   Moving or Speaking So Slowly that Other People Could Have Noticed? Or the Opposite - Being So Fidgety -   Thoughts that You Would be Better Off Dead or of Hurting Yourself in Some Way -   PHQ-9: Brief Depression Severity Measure Score 0   If You Checked Off Any Problems, How Difficult Have These Problems Made It For You to Do Your Work, Take Care of Things at Home, or Get Along with Other People? -       Health Habits and Functional and Cognitive Screening:  Functional & Cognitive Status 3/22/2023   Do you have difficulty preparing food and eating? Yes   Do you have difficulty bathing yourself, getting dressed or grooming yourself? Yes   Do you have difficulty using the toilet? Yes   Do you have difficulty moving around from place to place? Yes   Do you have trouble with steps or getting out of a bed or a chair? Yes   Current Diet Well Balanced Diet   Dental Exam Not up to date   Eye Exam Up to date   Exercise (times per week) 0 times per week   Current Exercises Include No Regular Exercise   Do you need help using the phone?  No   Are you deaf or do you have serious difficulty hearing?  No   Do you need help with transportation? Yes   Do you need help shopping? No   Do you need help preparing meals?  Yes   Do you need help with housework?  No   Do you need help with laundry? No   Do you need help taking your medications? No   Do you need help managing money? Yes   Do you ever drive or ride in a car without wearing a seat belt? No   Have you felt unusual stress, anger or loneliness in the last month? No   Who do you live with? Sibling   If you need help, do you have trouble finding someone available to you? No   Have you been bothered in the last four weeks by sexual problems? No   Do you have difficulty concentrating,  remembering or making decisions? Yes       Age-appropriate Screening Schedule:  Refer to the list below for future screening recommendations based on patient's age, sex and/or medical conditions. Orders for these recommended tests are listed in the plan section. The patient has been provided with a written plan.    Health Maintenance   Topic Date Due   • TDAP/TD VACCINES (1 - Tdap) Never done   • ZOSTER VACCINE (1 of 2) Never done   • Pneumococcal Vaccine 65+ (1 - PCV) Never done   • HEPATITIS C SCREENING  Never done   • ANNUAL WELLNESS VISIT  Never done   • LIPID PANEL  Never done   • COLORECTAL CANCER SCREENING  09/17/2031   • COVID-19 Vaccine  Completed   • INFLUENZA VACCINE  Completed          CMS Preventative Services Quick Reference  Risk Factors Identified During Encounter    Depression/Dysphoria: Current medication adjusted.  Follow up visit planned.  Immunizations Discussed/Encouraged: Tdap and Shingrix  Inactivity/Sedentary: Patient was advised to exercise at least 150 minutes a week per CDC recommendations.    The above risks/problems have been discussed with the patient.  Pertinent information has been shared with the patient in the After Visit Summary.  An After Visit Summary and PPPS were made available to the patient.  Diagnoses and all orders for this visit:    1. History of pharyngeal cancer (Primary)    2. Stenosis of esophagus    3. Moderate protein-calorie malnutrition (HCC)    4. Primary hypertension    5. Acquired hypothyroidism    6. Moderate episode of recurrent major depressive disorder (HCC)  -     venlafaxine XR (Effexor XR) 150 MG 24 hr capsule; Take 1 capsule by mouth Daily.  Dispense: 90 capsule; Refill: 1      Follow Up:  Next Medicare Wellness visit to be scheduled in 1 year.        Additional E&M Note during same encounter follows:  Patient has multiple medical problems which are significant and separately identifiable that require additional work above and beyond the Medicare  "Wellness Visit.      Chief Complaint  Depression and Difficulty Swallowing    Subjective        HPI  Fortino Dennis is also being seen today for depression and difficulty swallowing. He notes he has been having worsened depression owing to his decreasing appetite and weight loss. He also has some issues with his memory for which he takes donepezil. He has history of head and neck cancer for which he is seeing Dr. Card with several studies still pending.     Review of Systems   Respiratory: Negative for shortness of breath.    Cardiovascular: Negative for chest pain.       Objective   Vital Signs:  /74 (BP Location: Left arm, Patient Position: Sitting, Cuff Size: Adult)   Pulse 54   Temp 97.6 °F (36.4 °C)   Resp 16   Ht 185.4 cm (73\")   Wt 60.2 kg (132 lb 12.8 oz)   SpO2 98%   BMI 17.52 kg/m²     Physical Exam  Constitutional:       General: He is not in acute distress.  Cardiovascular:      Rate and Rhythm: Normal rate and regular rhythm.      Heart sounds: Normal heart sounds. No murmur heard.  Pulmonary:      Effort: Pulmonary effort is normal. No respiratory distress.      Breath sounds: Normal breath sounds. No wheezing.   Neurological:      Mental Status: He is alert and oriented to person, place, and time.      Gait: Gait normal.   Psychiatric:         Mood and Affect: Mood normal.         Behavior: Behavior normal.                         Assessment and Plan   Diagnoses and all orders for this visit:    1. History of pharyngeal cancer (Primary)  2. Stenosis of esophagus  He remains under the care of Dr. Card with several studies (PET, swallow) and speech therapy evaluation pending. He has required esophageal dilatation as recently as 7/2022 and may require this in the future as well.     3. Moderate protein-calorie malnutrition (HCC)  He has a PEG tube and has used up to 2000 calories with his tube feeding, but has slacked off this recently with a pending evaluation to case management. "     4. Primary hypertension  Well controlled, BP goal for age is <140/90 per JNC 8 guidelines and continue current medications    5. Acquired hypothyroidism  TSH in February was normal. Slight elevation in March, but will leave dosing the same at this time.     6. Moderate episode of recurrent major depressive disorder (HCC)  -     venlafaxine XR (Effexor XR) 150 MG 24 hr capsule; Take 1 capsule by mouth Daily.  Dispense: 90 capsule; Refill: 1  Increase Effexor given suboptimal control of depression.            Follow Up   Return in about 3 months (around 6/22/2023) for Recheck.  Patient was given instructions and counseling regarding his condition or for health maintenance advice. Please see specific information pulled into the AVS if appropriate.

## 2023-03-23 ENCOUNTER — TELEPHONE (OUTPATIENT)
Dept: RADIATION ONCOLOGY | Facility: HOSPITAL | Age: 73
End: 2023-03-23
Payer: MEDICARE

## 2023-03-23 ENCOUNTER — OFFICE VISIT (OUTPATIENT)
Dept: PHYSICAL THERAPY | Facility: CLINIC | Age: 73
End: 2023-03-23
Payer: MEDICARE

## 2023-03-23 DIAGNOSIS — R13.12 OROPHARYNGEAL DYSPHAGIA: Primary | ICD-10-CM

## 2023-03-23 DIAGNOSIS — R47.1 DYSARTHRIA: ICD-10-CM

## 2023-03-23 PROCEDURE — 92610 EVALUATE SWALLOWING FUNCTION: CPT | Performed by: SPEECH-LANGUAGE PATHOLOGIST

## 2023-03-23 PROCEDURE — 92522 EVALUATE SPEECH PRODUCTION: CPT | Performed by: SPEECH-LANGUAGE PATHOLOGIST

## 2023-03-23 NOTE — TELEPHONE ENCOUNTER
LATESHA spoke to Mr. Dennis via phone. SW introduced self and explained role and source of support. Mr. Dennis is 72 years old and currently lives with his brother. Mr. Dennis states he recently moved from [place] to be closer to family. He reports he is independent with his ADLs. SW did ask if he had any difficulty getting tube feeding or supplies and he said he had everything he needed.   At this time he states he does not have any financial or transportation concerns. He did ask if there was transportation available. LATESHA informed him of PATS and if he is a member at the [facility] they possibly can help with transportation. He explained he sold his car when he left [place] and is unsure if he will get another one. His brother has been driving him to his appointments.     Mr. Dennis is still adjusting to his new environment/surroundings. Emotional support and reassurance provided. He said he has been busy getting established with all his new doctors. He did have questions regarding his medication and SW encouraged him to contact his PCP as this was out of the SW scope. He was happy to be reciving speech services. Mr. Dennis denied needing assistance, at this time but was provided this writer’s contact information and encouraged him to call as needed.

## 2023-03-23 NOTE — PROGRESS NOTES
Speech/Language Pathology Initial Evaluation and Plan of Care  [address] [place] [address]    Patient: Fortino Dennis               : 1950  Visit Date: 3/23/2023  Referring practitioner: Alli Card Jr*  Date of Initial Visit: 3/23/2023  Patient seen for 1 sessions    Visit Diagnoses:    ICD-10-CM ICD-9-CM   1. Oropharyngeal dysphagia  R13.12 787.22   2. Dysarthria  R47.1 784.51     Past Medical History:   Diagnosis Date   • Acquired hypothyroidism    • Anxiety    • Arthritis    • Depression    • Essential hypertension    • GERD (gastroesophageal reflux disease)    • Hiatal hernia    • Hyperlipidemia    • Hypertension    • Memory problem     aricept   • Mixed hyperlipidemia    • Osteonecrosis of jaw (HCC)    • Throat cancer (HCC)    • Thyroid disorder      No past surgical history on file.    SUBJECTIVE     Subjective   Patient presents with the following symptoms: coughing, choking, difficulty swallowing solids, difficulty swallowing liquids, difficulty swallowing pills, weight loss, history of aspiration and esophageal symptoms   Date of Onset:   History of present problems: throat cancer diagnosed and treated in , free flap mandible reconstruction   The functional impact on the patient: difficulty with speech and swallowing   Prior level of function: WFL prior to initial surgery. Was doing well until jaw reconstruction in   Pertinent Medical History Related to this Problem: head/neck cancer and hiatal hernia,   Current Diet Level:   Solid: 4 - Puree  Liquid: 0 - Thin  Non-oral Feeding: Gastronomy tube, goal of 2k caterina / day    OBJECTIVE     ORAL [facility]  Respiratory/Swallow Coordination: Moderately impaired  Position During Evaluation: Upright  Anatomy/Physiology: Patient presents with surgical and radiation changes due to head and neck cancer  Dentition: Patient has own teeth and Implants in jaw reconstruction  Oral  Health: Poor oral health  Awareness/Control of Secretions: Patient wipes mouth, There is anterior loss of secretions and Patient is aware of drooling    Method of Food Administration: Cup    CLINICAL OBSERVATIONS  Oral Phase: Impaired lip closure, reduced tongue elevation, reduced tongue lateralization (bilateral), reduced tongue anterior/posterior movement, decreased oral sensation (left), reduced tone in cheeks (L>R), labial droop (left), reduced tone in lips (bilateral) and reduced tone in tongue (bilateral)  Pharyngeal Symptoms: multiple swallows with consistencies of thin water, coughing with consistencies of thin water and throat clearing with consistencies of thin water and habitual throat clearing.   Summary of Clinical Exam: Severe oral pharyngeal dysphagia, Mild dysarthria    INSTRUMENTAL ASSESSMENT  Instrumental Exam Completed?: Yes, repeat study warranted. Patient was to have repeat study in December but then he moved here.   Summary Statement: Records to be requested.     IMPRESSION/RECOMMENDATIONS  Factors Affecting Performance: no difficulty participating in study  Learning Motivation: strong  Education/Learning Comments: Patient demonstrated understanding of evaluation results and plan of care.     PATIENT SELF ASSESSMENT    EAT 10  0= No problem 4= Severe problem  1. My swallowing has caused me to lose weight 3   2. My Swallowing problem interferes with my ability to go out for meals  3   3. Swallowing liquids takes extra effort 3   4. Swallowing solids takes extra effort 4   5. Swallowing pills takes extra effort 3   6. Swallowing is painful 0   7. The pleasure of eating is affected by my swallowing 3   8. When I swallow, food sticks in my throat 2   9. I cough when I eat 3   10. Swallowing is stressful 3                                                                                                  Total Score 27     0-9 Minimal  10-19 Mild  20-29 Moderate 30-40 Severe      Clinical Impression  (Swallowing):   Moderate: oropharyngeal phase dysphagia  Impact on Function: risk for inadequate nutrition, inadequate hydration, aspiration, pneumonia and social aspects of eating  Prognosis Comment: Pending VFSS report       Therapy Education/Self Care    Details: Evaluation results and POC   Given schedule   Progress: New   Education provided to:  Patient   Level of understanding Verbalized           Total Time of Visit:            60   mins    ASSESSMENT/PLAN     Goals                                            STG  Comments Status   Patient will improve oral skills to enhance safety and increase eating efficiency and bolus control as measured by increased lip closure, decreased anterior loss of bolus, improved bolus formation and improved management of secretions.     Pharyngeal goals pending VFSS report     Patient will report decreased difficulty with swallowing and improved EAT-10 score. Eat 10 today: 27 - Moderate    Patient will increase cheek and lip strength and increase spontaneous swallow of secretions in order to decrease drooling as measured by patient report of severity.      Patient will increase strength and power of articulators so they can move more quickly and accurately to improve speech intelligibility as measured by accurate production of consonant sounds in 90% of speech sample.          LTG      Patient will safely consume some PO diet without aspiration or pneumonia while maintaining nutrition/hydration via alternate means.     Patient will be able to engage in speech at the conversational level so that speech is understood by familiar /unfamiliar listeners 90% of the time after spontaneous repair.                ASSESSMENT:   Patient is indicated for skilled care by a Speech/Language Pathologist.     Summary of Assessment: Patient presents with mild dysarthria and moderate oral pharyngeal dysphagia secondary to head/neck cancer treatment including radiation and surgery. VFSS is warranted  to determine swallow physiology due to worsening swallow.     Recommendations for Diet/Nutrition: Current ELLIOTT with some PO pending VFSS results.   Swallowing Precautions: upright position for at least 30 minutes after meals, small sips and bites when eating and slow rate; empty mouth between bites, oral care  Therapy Recommendations: dysphagia therapy to address swallowing deficits and Videofluoroscopic Swallowing Study (VFSS), speech therapy for dysarthria.   Screening indicates the further need for Physical Therapy.    PLAN:  Details of Plan of Care: VFSS to be completed. Initiate therapy and home exercises.     Frequency: 1 time per week  Duration: 12 visits  Estimated Length of Session: 45 minutes    SPEECH/LANGUAGE PATHOLOGIST SIGNATURE: Sosa Membreno, CCC-SLP, [place] License #: [id number]  Electronically Signed on 3/23/2023        Initial Certification  Certification Period: 3/23/2023 through 6/20/2023  I certify that the therapy services are furnished while this patient is under my care.  The services outlined above are required by this patient, and will be reviewed every 90 days.     PHYSICIAN: Alli Card Jr., MD (NPI: [id number])    Signature:____________________________________________DATE: _________     Please sign and return via fax to [phone].   Thank you so much for letting us work with Fortino. I appreciate your letting us work with your patients. If you have any questions or concerns, please don't hesitate to contact me.          [address] [address]  [phone]

## 2023-03-29 ENCOUNTER — HOSPITAL ENCOUNTER (OUTPATIENT)
Dept: GENERAL RADIOLOGY | Facility: HOSPITAL | Age: 73
Discharge: HOME | End: 2023-03-29
Admitting: OTOLARYNGOLOGY
Payer: MEDICARE

## 2023-03-29 DIAGNOSIS — R13.12 OROPHARYNGEAL DYSPHAGIA: Primary | ICD-10-CM

## 2023-03-29 PROCEDURE — 74230 X-RAY XM SWLNG FUNCJ C+: CPT

## 2023-03-29 PROCEDURE — 63710000001 BARIUM SULFATE 40 % PASTE: Performed by: OTOLARYNGOLOGY

## 2023-03-29 PROCEDURE — A9270 NON-COVERED ITEM OR SERVICE: HCPCS | Performed by: OTOLARYNGOLOGY

## 2023-03-29 PROCEDURE — 63710000001 BARIUM SULFATE 40 % SUSPENSION: Performed by: OTOLARYNGOLOGY

## 2023-03-29 PROCEDURE — 63710000001 BARIUM SULFATE 40 % RECONSTITUTED SUSPENSION: Performed by: OTOLARYNGOLOGY

## 2023-03-29 PROCEDURE — 92611 MOTION FLUOROSCOPY/SWALLOW: CPT | Performed by: SPEECH-LANGUAGE PATHOLOGIST

## 2023-03-29 RX ADMIN — BARIUM SULFATE 60 ML: 400 SUSPENSION ORAL at 09:12

## 2023-03-29 RX ADMIN — BARIUM SULFATE 55 ML: 0.81 POWDER, FOR SUSPENSION ORAL at 09:12

## 2023-03-29 RX ADMIN — BARIUM SULFATE 25 ML: 400 PASTE ORAL at 09:12

## 2023-03-31 ENCOUNTER — TREATMENT (OUTPATIENT)
Dept: PHYSICAL THERAPY | Facility: CLINIC | Age: 73
End: 2023-03-31
Payer: MEDICARE

## 2023-03-31 DIAGNOSIS — R47.1 DYSARTHRIA: ICD-10-CM

## 2023-03-31 DIAGNOSIS — R13.12 OROPHARYNGEAL DYSPHAGIA: Primary | ICD-10-CM

## 2023-03-31 PROCEDURE — 92526 ORAL FUNCTION THERAPY: CPT | Performed by: SPEECH-LANGUAGE PATHOLOGIST

## 2023-03-31 PROCEDURE — 92507 TX SP LANG VOICE COMM INDIV: CPT | Performed by: SPEECH-LANGUAGE PATHOLOGIST

## 2023-04-06 ENCOUNTER — TELEPHONE (OUTPATIENT)
Dept: PHYSICAL THERAPY | Facility: CLINIC | Age: 73
End: 2023-04-06

## 2023-04-06 NOTE — TELEPHONE ENCOUNTER
Caller: Fortino Dennis    Relationship: Self       What was the call regarding:NOT FEELING WELL TODAY

## 2023-04-07 ENCOUNTER — HOSPITAL ENCOUNTER (OUTPATIENT)
Dept: CT IMAGING | Facility: HOSPITAL | Age: 73
Discharge: HOME | End: 2023-04-07
Payer: MEDICARE

## 2023-04-07 PROCEDURE — A9552 F18 FDG: HCPCS | Performed by: OTOLARYNGOLOGY

## 2023-04-07 PROCEDURE — 78815 PET IMAGE W/CT SKULL-THIGH: CPT

## 2023-04-07 PROCEDURE — 0 FLUDEOXYGLUCOSE F18 SOLUTION: Performed by: OTOLARYNGOLOGY

## 2023-04-07 RX ADMIN — FLUDEOXYGLUCOSE F18 1 DOSE: 300 INJECTION INTRAVENOUS at 09:54

## 2023-04-11 ENCOUNTER — TREATMENT (OUTPATIENT)
Dept: PHYSICAL THERAPY | Facility: CLINIC | Age: 73
End: 2023-04-11
Payer: MEDICARE

## 2023-04-11 DIAGNOSIS — R13.12 OROPHARYNGEAL DYSPHAGIA: Primary | ICD-10-CM

## 2023-04-11 DIAGNOSIS — R47.1 DYSARTHRIA: ICD-10-CM

## 2023-04-11 PROCEDURE — 92526 ORAL FUNCTION THERAPY: CPT | Performed by: SPEECH-LANGUAGE PATHOLOGIST

## 2023-04-11 PROCEDURE — 92507 TX SP LANG VOICE COMM INDIV: CPT | Performed by: SPEECH-LANGUAGE PATHOLOGIST

## 2023-04-11 NOTE — PROGRESS NOTES
Speech Language Pathology Treatment Note  [address] [place] [address]    Patient: Fortino Dennis                                                                                     Visit Date: 2023  :     1950    Referring practitioner:    Alli Card Jr*  Date of Initial Visit:          Type: THERAPY  Noted: 3/23/2023    Patient seen for 3 sessions    Visit Diagnoses:    ICD-10-CM ICD-9-CM   1. Oropharyngeal dysphagia  R13.12 787.22   2. Dysarthria  R47.1 784.51     SUBJECTIVE     Reviewed swallow study and initiated exercises. Patient fully able to participate.     OBJECTIVE   GOALS  Goals                                            STG  Comments Status   Patient will improve oral skills to enhance safety and increase eating efficiency and bolus control as measured by increased lip closure, decreased anterior loss of bolus, improved bolus formation and improved management of secretions. Reviewed tongue push out and tongue push up exercises. Patient needed re-instruction. Able to complete with cues to go slower and push.   Ongoing   Patient will improve hyolaryngeal elevation, improve epiglottic inversion, improve laryngeal closure, improve UES opening, increase base of the tongue strength and posterior pharyngeal wall excursion and increase efficiency of cough to clear residue from the airway as measured by decreased overt signs and symptoms of aspiration and decreased penetration and aspiration on repeat instrumental swallow study, if applicable.   Reviewed effortful swallow, supraglottic swallow, and CTAR for swallowing exercises. Patient needed re-instruction, able to complete with cues.  Ongoing   Patient will report decreased difficulty with swallowing and improved EAT-10 score. Initial Eat 10:  27 - Moderate  Ongoing   Patient will increase cheek and lip strength and increase spontaneous swallow of secretions in  order to decrease drooling as measured by patient report of severity.  Introduced lip press and pucker exercises. Patient able to demonstrate after instruction and cues.   Ongoing   Patient will increase strength and power of articulators so they can move more quickly and accurately to improve speech intelligibility as measured by accurate production of consonant sounds in 90% of speech sample. Introduced over-articulation of CVC words.   New           LTG        Patient will safely consume some PO diet without aspiration or pneumonia while maintaining nutrition/hydration via alternate means.  Exercises ongoing. Patient needed re-instruction and cues to complete exercises.  Ongoing   Patient will be able to engage in speech at the conversational level so that speech is understood by familiar /unfamiliar listeners 90% of the time after spontaneous repair.    Reviewed tongue exercises. Introduced overarticulation with CVC syllable.  Ongoing                 Therapy Education/Self Care    Details: POC   Given Home Exercise Program  HyperWeek HEP (access code Access Code: [email or web address])   Progress: New and Reinforced   Education provided to:  Patient   Level of understanding Verbalized and Demonstrated             Total Time of Visit:             45   mins         ASSESSMENT/PLAN     ASSESSMENT: Reviewed and introduced exercises for swallowing and speech. Patient demonstrated understanding.     PLAN: Continue therapy and home exercises.     Progress Note Due Date:4/23/23  Recertification Due Date: 6/20/23    SIGNATURE: Sosa Membreno CCC-SLP, [place] License #: [id number]  Electronically Signed on 4/11/2023          [place] [address]  [phone]

## 2023-04-13 ENCOUNTER — TELEPHONE (OUTPATIENT)
Dept: OTOLARYNGOLOGY | Facility: CLINIC | Age: 73
End: 2023-04-13
Payer: MEDICARE

## 2023-04-13 NOTE — TELEPHONE ENCOUNTER
----- Message from Alli Card Jr., MD sent at 4/7/2023  6:32 AM CDT -----  Regarding: Swallow test results  Please call patient tell him he has deep swallowing issues.  I will discuss this with him at his next visit.  ----- Message -----  From: Interface, Rad Results [place] In  Sent: 3/29/2023   9:33 AM CDT  To: Alli Card Jr., MD

## 2023-04-18 ENCOUNTER — TREATMENT (OUTPATIENT)
Dept: PHYSICAL THERAPY | Facility: CLINIC | Age: 73
End: 2023-04-18
Payer: MEDICARE

## 2023-04-18 DIAGNOSIS — R13.12 OROPHARYNGEAL DYSPHAGIA: Primary | ICD-10-CM

## 2023-04-18 DIAGNOSIS — R47.1 DYSARTHRIA: ICD-10-CM

## 2023-04-18 NOTE — PROGRESS NOTES
3                                                                  Speech Language Pathology 30 Day Progress Note  [address] [place] [address]    Patient: Fortino Dennis                                                                                     Visit Date: 2023  :     1950    Referring practitioner:    Alli Card Jr*  Date of Initial Visit:          Type: THERAPY  Noted: 3/23/2023    Patient seen for 4 sessions    Visit Diagnoses:    ICD-10-CM ICD-9-CM   1. Oropharyngeal dysphagia  R13.12 787.22   2. Dysarthria  R47.1 784.51     SUBJECTIVE     Reviewed swallow study and initiated exercises. Patient fully able to participate.     OBJECTIVE   GOALS  Goals                                            STG  Comments Status   Patient will improve oral skills to enhance safety and increase eating efficiency and bolus control as measured by increased lip closure, decreased anterior loss of bolus, improved bolus formation and improved management of secretions. Reviewed tongue push out and tongue push up exercises. Patient needed re-instruction. Able to complete with cues to go slower and push.   Ongoing   Patient will improve hyolaryngeal elevation, improve epiglottic inversion, improve laryngeal closure, improve UES opening, increase base of the tongue strength and posterior pharyngeal wall excursion and increase efficiency of cough to clear residue from the airway as measured by decreased overt signs and symptoms of aspiration and decreased penetration and aspiration on repeat instrumental swallow study, if applicable.   Reviewed effortful swallow, supraglottic swallow, and CTAR for swallowing exercises. Patient needed re-instruction, able to complete with cues. He complained of some discomfort with CTAR, stating the ball was too hard, he is using a towel at home.  Ongoing   Patient will report decreased difficulty with swallowing and improved EAT-10 score. Initial Eat 10:  27 -  Moderate  Ongoing   Patient will increase cheek and lip strength and increase spontaneous swallow of secretions in order to decrease drooling as measured by patient report of severity.  Reviewed lip press and pucker exercises. Patient able to demonstrate after instruction and cues.   Ongoing   Patient will increase strength and power of articulators so they can move more quickly and accurately to improve speech intelligibility as measured by accurate production of consonant sounds in 90% of speech sample. Worked on over articulation with CVC words with consonant clusters (initial and final Ongoing           LTG        Patient will safely consume some PO diet without aspiration or pneumonia while maintaining nutrition/hydration via alternate means.  Exercises ongoing. Patient needed re-instruction and cues to complete exercises.  Ongoing   Patient will be able to engage in speech at the conversational level so that speech is understood by familiar /unfamiliar listeners 90% of the time after spontaneous repair.    Reviewed tongue exercises. Introduced overarticulation with CVC syllables with consonant clusters.  Ongoing                 Therapy Education/Self Care    Details: POC   Given Home Exercise Program  [facility] (access code Access Code: [email or web address])   Progress: Reinforced   Education provided to:  Patient   Level of understanding Verbalized and Demonstrated             Total Time of Visit:             45   mins         ASSESSMENT/PLAN     ASSESSMENT: Reviewed and introduced exercises for swallowing and speech. Patient demonstrated understanding.     PLAN: Continue therapy and home exercises.     Progress Note Due Date:5/23/23  Recertification Due Date: 6/20/23    SIGNATURE: Sosa Membreno CCC-SLP, [place] License #: [id number]  Electronically Signed on 4/18/2023          [facility] [place] [address]  [phone]

## 2023-05-02 ENCOUNTER — TREATMENT (OUTPATIENT)
Dept: PHYSICAL THERAPY | Facility: CLINIC | Age: 73
End: 2023-05-02
Payer: MEDICARE

## 2023-05-02 DIAGNOSIS — R47.1 DYSARTHRIA: ICD-10-CM

## 2023-05-02 DIAGNOSIS — R13.12 OROPHARYNGEAL DYSPHAGIA: Primary | ICD-10-CM

## 2023-05-02 NOTE — PROGRESS NOTES
3                                                                  Speech Language Pathology Treatment Note  [address] [place] [address]    Patient: Fortino Dennis                                                                                     Visit Date: 2023  :     1950    Referring practitioner:    Alli Card Jr*  Date of Initial Visit:          Type: THERAPY  Noted: 3/23/2023    Patient seen for 5 sessions    Visit Diagnoses:    ICD-10-CM ICD-9-CM   1. Oropharyngeal dysphagia  R13.12 787.22   2. Dysarthria  R47.1 784.51     SUBJECTIVE     Patient c/o being cold and not feeling very well. He stated that he has not been doing his exercises much because he has not felt like it. He agreed to increase.     OBJECTIVE   GOALS  Goals                                            STG  Comments Status   Patient will improve oral skills to enhance safety and increase eating efficiency and bolus control as measured by increased lip closure, decreased anterior loss of bolus, improved bolus formation and improved management of secretions. Reviewed tongue push out and tongue push up exercises.Able to complete with minimal cues.  Ongoing   Patient will improve hyolaryngeal elevation, improve epiglottic inversion, improve laryngeal closure, improve UES opening, increase base of the tongue strength and posterior pharyngeal wall excursion and increase efficiency of cough to clear residue from the airway as measured by decreased overt signs and symptoms of aspiration and decreased penetration and aspiration on repeat instrumental swallow study, if applicable.   Reviewed effortful swallow, supraglottic swallow, and CTAR for swallowing exercises. Patient needed minimal cues. Introduced EMST-75 for swallowing and patient requested for DME to be ordered. SLP completed paperwork for ordering and sent DME RX to MD.  Ongoing   Patient will report decreased difficulty with swallowing and improved EAT-10  score. Initial Eat 10:  27 - Moderate  Ongoing   Patient will increase cheek and lip strength and increase spontaneous swallow of secretions in order to decrease drooling as measured by patient report of severity.  Reviewed lip press and pucker exercises. Patient able to demonstrate with minimal cues.   Ongoing   Patient will increase strength and power of articulators so they can move more quickly and accurately to improve speech intelligibility as measured by accurate production of consonant sounds in 90% of speech sample. Worked on over articulation with CVC words with consonant clusters (initial and final Ongoing           LTG        Patient will safely consume some PO diet without aspiration or pneumonia while maintaining nutrition/hydration via alternate means.  Exercises ongoing. Patient needed minimal cues to complete exercises. EMST introduced.  Ongoing   Patient will be able to engage in speech at the conversational level so that speech is understood by familiar /unfamiliar listeners 90% of the time after spontaneous repair.    Reviewed tongue exercises. Introduced overarticulation with CVC syllables with consonant clusters.  Ongoing                 Therapy Education/Self Care    Details: POC   Given Home Exercise Program  [facility] (access code Access Code: [email or web address])   Progress: Reinforced   Education provided to:  Patient   Level of understanding Verbalized and Demonstrated             Total Time of Visit:             45   mins         ASSESSMENT/PLAN     ASSESSMENT: Reviewed and introduced exercises for swallowing and speech. Patient demonstrated understanding.     PLAN: Continue therapy and home exercises.     Progress Note Due Date:5/23/23  Recertification Due Date: 6/20/23    SIGNATURE: Sosa eMmbreno CCC-SLP, [place] License #: [id number]  Electronically Signed on 5/2/2023          [facility] [place] [address]  [phone]

## 2023-05-05 ENCOUNTER — TELEPHONE (OUTPATIENT)
Dept: OTOLARYNGOLOGY | Facility: CLINIC | Age: 73
End: 2023-05-05

## 2023-05-10 ENCOUNTER — OFFICE VISIT (OUTPATIENT)
Dept: OTOLARYNGOLOGY | Facility: CLINIC | Age: 73
End: 2023-05-10
Payer: MEDICARE

## 2023-05-10 VITALS
WEIGHT: 140 LBS | BODY MASS INDEX: 18.55 KG/M2 | SYSTOLIC BLOOD PRESSURE: 129 MMHG | TEMPERATURE: 97.8 F | HEART RATE: 65 BPM | HEIGHT: 73 IN | DIASTOLIC BLOOD PRESSURE: 80 MMHG

## 2023-05-10 DIAGNOSIS — K12.33 MUCOSITIS DUE TO RADIATION THERAPY: ICD-10-CM

## 2023-05-10 DIAGNOSIS — H21.511 SYNECHIA, ANTERIOR, RIGHT: ICD-10-CM

## 2023-05-10 DIAGNOSIS — R13.12 OROPHARYNGEAL DYSPHAGIA: ICD-10-CM

## 2023-05-10 DIAGNOSIS — K22.2 ESOPHAGEAL STENOSIS: ICD-10-CM

## 2023-05-10 DIAGNOSIS — R13.11 ORAL PHASE DYSPHAGIA: ICD-10-CM

## 2023-05-10 DIAGNOSIS — C10.2 MALIGNANT NEOPLASM OF LATERAL WALL OF OROPHARYNX: ICD-10-CM

## 2023-05-10 DIAGNOSIS — E44.0 MODERATE PROTEIN-CALORIE MALNUTRITION: Primary | ICD-10-CM

## 2023-05-10 DIAGNOSIS — C14.0 SQUAMOUS CELL CARCINOMA OF PHARYNX: ICD-10-CM

## 2023-05-10 NOTE — PATIENT INSTRUCTIONS
PREOPERATIVE SURGERY/PROCEDURE INSTRUCTIONS:  Do not eat or drink ANYTHING after midnight, unless instructed   Clean the operative site by showering with an antibacterial soap (like Dial, Dove, Ivory, etc) and shampooing hair  Preoperative scrub for Surgery:   Skin: Antibacterial soap (Dial, Ivory, Dove) shower daily, including hair.  Be careful not to get into eyes  Do this daily for 5 days  Mouth: Betadine solution 3 times daily for 5 days  Do NOT pluck, shave hair on skin the night prior to operation  If you are diabetic, take your blood sugar the night before and in the morning prior to coming to hospital and give results to nurse and the anesthesiologist    Remove any metallic piercings prior to surgery. You may wear plastic spacers if needed.    Do NOT apply eye makeup Morning of surgery    Please remove fingernail polish prior to surgery    STOP:  -   All natural/homeopathic medications 2 weeks prior to surgery, Ask about over the counter medications  -   Smoking 2 weeks prior to surgery  -   Blood thinners- 3-5 days prior to surgery (or as instructed by doctor)  Bring with you the morning of surgery:  -   Preoperative paperwork  -   Insurance card  -   Identification with photo  -   Home medications or up to date list     Alli Card Jr, MD has explained the risks, benefits and alternatives to the patient/patient’s representative, in clear and simple language.  Time was allowed for questions.  Risks of procedure include but are not limited to:    As a result of this procedure being performed, the material risks generally recognized are INFECTION, ALLERGIC REACTION, SEVERE LOSS OF BLOOD, LOSS OR LOSS OF FUNCTION OF ANY LIMB OR ORGAN, PARALYSIS OR PARTIAL PARALYSIS, PARAPLEGIA OR QUADRIPLEGIA, DISFIGURING SCAR, BRAIN DAMAGE, CARDIAC ARREST OR DEATH, BLOOD LOSS NECESSITATING TRANSFUSION WHICH CARRIES THE RISK OF EXPOSURE TO AIDS, HEPATITIS OR OTHER INFECTIOUS DISEASES.      Procedure: Direct laryngoscopy,  Esophagoscopy, Bronchoscopy, Microlaryngoscopy, Possible laser use (any or all of the above)    Risks specific for procedure: Perforation of the upper and lower aerodigestive tract including esophagus and trachea, loss of airway, permanent damage to voice and/or swallowing, loss of voice, need for tracheostomy, fire in the airway, scarring and stenosis    No guarantees of outcome given or implied  Patient, friend  demonstrate understanding    Patient, Friend  do wish to proceed with proposed procedure      See Dr miller about imbalance    CONTACT INFORMATION:  The main office phone number is [phone]. For emergencies after hours and on weekends, this number will convert over to our answering service and the on call provider will answer. Please try to keep non emergent phone calls/ questions to office hours 9am-5pm Monday through Friday.     [employer]  As an alternative, you can sign up and use the Epic MyChart system for more direct and quicker access for non emergent questions/ problems.  [employer] allows you to send messages to your doctor, view your test results, renew your prescriptions, schedule appointments, and more. To sign up, go to [employer] and click on the Sign Up Now link in the New User? box. Enter your [employer] Activation Code exactly as it appears below along with the last four digits of your Social Security Number and your Date of Birth () to complete the sign-up process. If you do not sign up before the expiration date, you must request a new code.    [employer] Activation Code: Activation code not generated  Current [employer] Status: Active    If you have questions, you can email "[email or web address] or call [phone] to talk to our [employer] staff. Remember, [employer] is NOT to be used for urgent needs. For medical emergencies, dial 911.

## 2023-05-10 NOTE — LETTER
May 10, 2023       No Recipients    Patient: Fortino Dennis   YOB: 1950   Date of Visit: 5/10/2023       Dear Dr. Nichols Recipients:    Thank you for referring Fortino Dennis to me for evaluation. Below are the relevant portions of my assessment and plan of care.    If you have questions, please do not hesitate to call me. I look forward to following Fortino along with you.         Sincerely,        Alli Card Jr, MD        CC:   No Recipients    Alli Card Jr., MD  05/10/23 1729  Signed      Alli Card Jr, MD  [facility]  [address], [address]  [facility] [address]  Fax [phone]  Phone [phone]      Visit Type: FOLLOW UP   Chief Complaint   Patient presents with   • recheck throat and mouth, discuss PET scan results     Thick mucus, bad taste sensation       HPI   Accompanied by: Friend  He presents for a follow up evaluation. He is still with poor swallowing. He is seeing Speech. L side, tongue and cheek insensate. He blends food. He has to see speech to stay motivated to do exercises. He is afraid he will bite tongue.      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 No pertinent surgical history.    Family History: His family history includes Cancer in his mother; Colon cancer in his mother; Dementia in his mother; Hypertension in his mother.     Social History: He  reports that  he has never smoked. He has never been exposed to tobacco smoke. He has never used smokeless tobacco. He reports current alcohol use of about 3.0 standard drinks per week. He reports that he does not currently use drugs.    Home Medications:  Acetaminophen, Senna, amLODIPine, atorvastatin, donepezil, levothyroxine, venlafaxine XR, and zolpidem    Allergies:  He has No Known Allergies.      Vital Signs:   Temp:  [97.8 °F (36.6 °C)] 97.8 °F (36.6 °C)  Heart Rate:  [65] 65  BP: (129)/(80) 129/80  ENT Physical Exam  Constitutional  Appearance: patient appears well-developed and well-nourished,  Communication/Voice: communication appropriate for developmental age; vocal quality normal;  Head and Face  Appearance: head appears normal, face appears normal and face appears atraumatic;  Palpation: facial palpation normal;  Salivary: glands normal;  Ear  Hearing: intact;  Auricles: bilateral auricles normal;  External Mastoids: right external mastoid normal; left external mastoid normal;  Ear Canals: bilateral ear canals normal;  Tympanic Membranes: bilateral tympanic membranes normal;  Nose  External Nose: nares patent bilaterally; external nose normal;  Internal Nose: nasal mucosa normal; Nasal mucosa comments: R side mid synecia, very thin   nasal septal deviation present; deviation is to the left, septal deviation is severe; Septum comments: R to L mid severe   bilateral inferior turbinates edematous and erythematous;  Oral Cavity/Oropharynx  Lips: normal;  Teeth: missing teeth (L mandible with impnat) noted;  Gums: gingival recession present; Gum comments: gingivits  Tongue: surface is coated and smooth; Oral Tongue comments: very dry  Oral mucosa: mucous membranes dry; buccal mucosa Buccal Mucosa comments: thickened secretions  Hard palate: normal;  Soft palate: normal;  Tonsils: bilateral tonsils 1+, Tonsil comments: dry and coated  Neck  Neck: scars (Left neck dissection scar, well-healed) present; neck asymmetric  (Status post left neck dissection); no neck mass present;  Thyroid: no thyroid mass present;  Neck comments: Left neck, well-healed  Very atrophic musculature of the right neck  Brawny induration left greater than right  Poor laryngeal elevation  Respiratory  Inspection: breathing unlabored; normal breathing rate;  Cardiovascular  Inspection: extremities are warm and well perfused; no peripheral edema present;  Lymphatic  Palpation: no cervical adenopathy noted;  Neurovestibular  Mental Status: alert and oriented;  Psychiatric: mood normal; affect is appropriate;  Drawings       Flexible laryngoscopy    Date/Time: 5/10/2023 11:20 AM  Performed by: Alli Card Jr., MD  Authorized by: Alli Card Jr., MD     Consent:     Consent obtained:  Verbal    Consent given by:  Patient    Alternatives discussed:  No treatment  Anesthesia (see MAR for exact dosages):     Anesthesia method:  Topical application    Topical anesthetic:  Tetracaine  Procedure details:     Indications: hoarseness, dysphagia, or aspiration and oncologic surveillance      Medication:  Afrin    Instrument: flexible fiberoptic laryngoscope      Scope location: left nare    Sinus/ Nasopharynx:     Right nasopharynx: patent and inflammation      Left nasopharynx: patent and inflammation      Right eustachian tube: patent      Left eustachian tube: inflammation, patent and inflammation    Oropharynx/ Supraglottis:     Posterior pharyngeal wall: inflamed      Oropharynx: inflammation      Vallecula: inflammation      Base of tongue: inflammation      Base of tongue: no lingual tonsillar hypertrophy      Epiglottis: inflammation and retroflexed    Larynx/ Hypopharynx:     Arytenoids: inflammation and interarytenoid edema      Hypopharynx: inflammation      Pyriform sinus: inflammation      False vocal cords: inflammation      True vocal cords: Yamilka's edema    Post-procedure details:     Patient tolerance of procedure:  Tolerated  well  Comments:      Generalized inflammation from the nasopharynx down into the hypopharynx and supraglottis  Poor contraction of bilateral lateral pharyngeal wall muscles with poor clearance of secretions  Pooling posterior to the arytenoids  Vallecula-no pooling  No evidence of recurrence       Result Review    RESULTS REVIEW    I have reviewed the patients old records in the chart.   I have reviewed the patients old records in the chart.  The following results/records were reviewed:  I reviewed the patient's new imaging results and agree with the interpretation.   NM PET/CT Skull Base to Mid Thigh (04/07/2023 11:27)  CT Soft Tissue Neck With Contrast (03/21/2023 17:30)   FL Video Swallow With Speech Single Contrast (03/29/2023 09:11)   Progress Notes by Sosa Membreno CCC-SLP (04/18/2023 09:45)   Progress Notes by Sosa Membreno CCC-SLP (05/02/2023 09:45)       Assessment & Plan    Diagnoses and all orders for this visit:    1. Moderate protein-calorie malnutrition (Primary)  Comments:  Persistent    2. Oral phase dysphagia  Comments:  Related to prior cancer therapy  Orders:  -     Case Request; Standing  -     Case Request  -     Laryngoscopy    3. Oropharyngeal dysphagia  Comments:  Persistent, related to prior cancer therapy  Orders:  -     Case Request; Standing  -     Case Request  -     Laryngoscopy    4. Squamous cell carcinoma of pharynx  Comments:  No evidence of disease  Orders:  -     Case Request; Standing  -     Case Request  -     Laryngoscopy    5. 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 Synechia, anterior, right    7. Mucositis due to radiation therapy  Comments:  Stable  Orders:  -     Case Request; Standing  -     Case Request  -     Laryngoscopy    8. Esophageal stenosis  Comments:  Related to prior cancer therapy    Other orders  -     Follow Anesthesia Guidelines / Protocol; Future  -     Obtain  Informed Consent  -     Provide Patient With Instructions on NPO Status  -     Follow Anesthesia Guidelines / Protocol; Standing  -     Verify NPO Status; Standing  -     SCD (Sequential Compression Device) - To Be Placed on Patient in Pre-Op; Standing  -     Patient to Void Prior to Transfer to OR; Standing       Medical and surgical options were discussed including observation, continued medical management, medication modification and surgical management. Risks, benefits and alternatives were discussed and questions were answered. After considering the options, the patient decided to proceed with surgical management.  Medical and surgical options were discussed including medical and surgical options. Risks, benefits and alternatives were discussed and questions were answered. After considering the options, the patient decided to proceed with surgery.     -----SURGERY SCHEDULING:-----  Schedule DIRECT LARYNGOSCOPY, ESOPHAGOSCOPY, BRONCHOSCOPY (Bilateral), MICRODIRECT LARYNGOSCOPY (Bilateral), OR ESOPHAGEAL DILATATION (Bilateral)    ---INFORMED CONSENT DISCUSSION:---  PANENDOSCOPY: The risks and benefits were explained including but not limited to pain, bleeding, infection, (including possible mediastinitis), the risks of the general anesthesia, pain, temporary or permanent hoarseness, airway loss, and/or tooth injury. Questions were answered. No guarantees were made or implied.      ---PREOPERATIVE WORKUP:---  labs/ workup per anesthesia  Preoperative Medicine evaluation for clearance- Dr Sierra    Patient appears to have significant dysphagia from long-term radiation complications.  He says that he has been followed in [place] with serial dilatations and e-stim.  I am concerned that the patient has poor hypopharyngeal contraction.  I have discussed risk, benefits, alternative treatments, options with the patient regarding dilatation.  I am especially concerned about perforation since patient has had radiation.   The patient wishes to proceed with panendoscopy and dilatation.  I will reach out to speech pathology for possible e-stim.  I will see if this helps the patient's symptoms.  I have also encouraged the patient to be consistent with post therapy exercises at home.  He states that he is somewhat reluctant to do when he has not in speech therapy.  He is very familiar with speech therapy.  Reflux precautions  Refer to speech therapy  Plan panendoscopy with dilatation of superior esophagus    My Chart:  Patient is using My Chart    Patient, friend understand(s) and agree(s) with the treatment plan as described.    Return RTC 2 weeks after surgery, for Recheck throat, swallowing.     Alli Card Jr, MD   05/10/23  11:21 CDT

## 2023-05-10 NOTE — PROGRESS NOTES
Alli Card Jr, MD  W ENT [facility] EAR NOSE & THROAT  [address], [address]  [place] [address]  Fax [phone]  Phone [phone]      Visit Type: FOLLOW UP   Chief Complaint   Patient presents with   • recheck throat and mouth, discuss PET scan results     Thick mucus, bad taste sensation        HPI   Accompanied by: Friend  He presents for a follow up evaluation. He is still with poor swallowing. He is seeing Speech. L side, tongue and cheek insensate. He blends food. He has to see speech to stay motivated to do exercises. He is afraid he will bite tongue.      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 No pertinent surgical history.    Family History: His family history includes Cancer in his mother; Colon cancer in his mother; Dementia in his mother; Hypertension in his mother.     Social History: He  reports that he has never smoked. He has never been exposed to tobacco smoke. He has never used smokeless tobacco. He reports current alcohol use of about 3.0 standard drinks per week. He reports that he does not currently use drugs.    Home Medications:  Acetaminophen, Senna, amLODIPine, atorvastatin, donepezil, levothyroxine, venlafaxine XR, and zolpidem    Allergies:  He has No Known Allergies.       Vital Signs:   Temp:  [97.8 °F (36.6 °C)] 97.8 °F (36.6 °C)  Heart Rate:  [65] 65  BP: (129)/(80) 129/80  ENT Physical  Exam  Constitutional  Appearance: patient appears well-developed and well-nourished,  Communication/Voice: communication appropriate for developmental age; vocal quality normal;  Head and Face  Appearance: head appears normal, face appears normal and face appears atraumatic;  Palpation: facial palpation normal;  Salivary: glands normal;  Ear  Hearing: intact;  Auricles: bilateral auricles normal;  External Mastoids: right external mastoid normal; left external mastoid normal;  Ear Canals: bilateral ear canals normal;  Tympanic Membranes: bilateral tympanic membranes normal;  Nose  External Nose: nares patent bilaterally; external nose normal;  Internal Nose: nasal mucosa normal; Nasal mucosa comments: R side mid synecia, very thin   nasal septal deviation present; deviation is to the left, septal deviation is severe; Septum comments: R to L mid severe   bilateral inferior turbinates edematous and erythematous;  Oral Cavity/Oropharynx  Lips: normal;  Teeth: missing teeth (L mandible with impnat) noted;  Gums: gingival recession present; Gum comments: gingivits  Tongue: surface is coated and smooth; Oral Tongue comments: very dry  Oral mucosa: mucous membranes dry; buccal mucosa Buccal Mucosa comments: thickened secretions  Hard palate: normal;  Soft palate: normal;  Tonsils: bilateral tonsils 1+, Tonsil comments: dry and coated  Neck  Neck: scars (Left neck dissection scar, well-healed) present; neck asymmetric (Status post left neck dissection); no neck mass present;  Thyroid: no thyroid mass present;  Neck comments: Left neck, well-healed  Very atrophic musculature of the right neck  Brawny induration left greater than right  Poor laryngeal elevation  Respiratory  Inspection: breathing unlabored; normal breathing rate;  Cardiovascular  Inspection: extremities are warm and well perfused; no peripheral edema present;  Lymphatic  Palpation: no cervical adenopathy noted;  Neurovestibular  Mental Status: alert and  oriented;  Psychiatric: mood normal; affect is appropriate;  Drawings       Flexible laryngoscopy    Date/Time: 5/10/2023 11:20 AM  Performed by: Alli Card Jr., MD  Authorized by: Alli Card Jr., MD     Consent:     Consent obtained:  Verbal    Consent given by:  Patient    Alternatives discussed:  No treatment  Anesthesia (see MAR for exact dosages):     Anesthesia method:  Topical application    Topical anesthetic:  Tetracaine  Procedure details:     Indications: hoarseness, dysphagia, or aspiration and oncologic surveillance      Medication:  Afrin    Instrument: flexible fiberoptic laryngoscope      Scope location: left nare    Sinus/ Nasopharynx:     Right nasopharynx: patent and inflammation      Left nasopharynx: patent and inflammation      Right eustachian tube: patent      Left eustachian tube: inflammation, patent and inflammation    Oropharynx/ Supraglottis:     Posterior pharyngeal wall: inflamed      Oropharynx: inflammation      Vallecula: inflammation      Base of tongue: inflammation      Base of tongue: no lingual tonsillar hypertrophy      Epiglottis: inflammation and retroflexed    Larynx/ Hypopharynx:     Arytenoids: inflammation and interarytenoid edema      Hypopharynx: inflammation      Pyriform sinus: inflammation      False vocal cords: inflammation      True vocal cords: Yamilka's edema    Post-procedure details:     Patient tolerance of procedure:  Tolerated well  Comments:      Generalized inflammation from the nasopharynx down into the hypopharynx and supraglottis  Poor contraction of bilateral lateral pharyngeal wall muscles with poor clearance of secretions  Pooling posterior to the arytenoids  Vallecula-no pooling  No evidence of recurrence       Result Review    RESULTS REVIEW    I have reviewed the patients old records in the chart.   I have reviewed the patients old records in the chart.  The following results/records were reviewed:  I reviewed the patient's  new imaging results and agree with the interpretation.   NM PET/CT Skull Base to Mid Thigh (04/07/2023 11:27)  CT Soft Tissue Neck With Contrast (03/21/2023 17:30)   FL Video Swallow With Speech Single Contrast (03/29/2023 09:11)   Progress Notes by Sosa Membreno, CCC-SLP (04/18/2023 09:45)   Progress Notes by Sosa Membreno CCC-SLP (05/02/2023 09:45)       Assessment & Plan    Diagnoses and all orders for this visit:    1. Moderate protein-calorie malnutrition (Primary)  Comments:  Persistent    2. Oral phase dysphagia  Comments:  Related to prior cancer therapy  Orders:  -     Case Request; Standing  -     Case Request  -     Laryngoscopy    3. Oropharyngeal dysphagia  Comments:  Persistent, related to prior cancer therapy  Orders:  -     Case Request; Standing  -     Case Request  -     Laryngoscopy    4. Squamous cell carcinoma of pharynx  Comments:  No evidence of disease  Orders:  -     Case Request; Standing  -     Case Request  -     Laryngoscopy    5. 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 Synechia, anterior, right    7. Mucositis due to radiation therapy  Comments:  Stable  Orders:  -     Case Request; Standing  -     Case Request  -     Laryngoscopy    8. Esophageal stenosis  Comments:  Related to prior cancer therapy    Other orders  -     Follow Anesthesia Guidelines / Protocol; Future  -     Obtain Informed Consent  -     Provide Patient With Instructions on NPO Status  -     Follow Anesthesia Guidelines / Protocol; Standing  -     Verify NPO Status; Standing  -     SCD (Sequential Compression Device) - To Be Placed on Patient in Pre-Op; Standing  -     Patient to Void Prior to Transfer to OR; Standing       Medical and surgical options were discussed including observation, continued medical management, medication modification and surgical management. Risks, benefits and alternatives were discussed and questions  were answered. After considering the options, the patient decided to proceed with surgical management.  Medical and surgical options were discussed including medical and surgical options. Risks, benefits and alternatives were discussed and questions were answered. After considering the options, the patient decided to proceed with surgery.     -----SURGERY SCHEDULING:-----  Schedule DIRECT LARYNGOSCOPY, ESOPHAGOSCOPY, BRONCHOSCOPY (Bilateral), MICRODIRECT LARYNGOSCOPY (Bilateral), OR ESOPHAGEAL DILATATION (Bilateral)    ---INFORMED CONSENT DISCUSSION:---  PANENDOSCOPY: The risks and benefits were explained including but not limited to pain, bleeding, infection, (including possible mediastinitis), the risks of the general anesthesia, pain, temporary or permanent hoarseness, airway loss, and/or tooth injury. Questions were answered. No guarantees were made or implied.      ---PREOPERATIVE WORKUP:---  labs/ workup per anesthesia  Preoperative Medicine evaluation for clearance- Dr Sierra    Patient appears to have significant dysphagia from long-term radiation complications.  He says that he has been followed in [place] with serial dilatations and e-stim.  I am concerned that the patient has poor hypopharyngeal contraction.  I have discussed risk, benefits, alternative treatments, options with the patient regarding dilatation.  I am especially concerned about perforation since patient has had radiation.  The patient wishes to proceed with panendoscopy and dilatation.  I will reach out to speech pathology for possible e-stim.  I will see if this helps the patient's symptoms.  I have also encouraged the patient to be consistent with post therapy exercises at home.  He states that he is somewhat reluctant to do when he has not in speech therapy.  He is very familiar with speech therapy.  Reflux precautions  Refer to speech therapy  Plan panendoscopy with dilatation of superior esophagus    My Chart:  Patient is using My  Chart    Patient, friend understand(s) and agree(s) with the treatment plan as described.    Return RTC 2 weeks after surgery, for Recheck throat, swallowing.      Alli Card Jr, MD   05/10/23  11:21 CDT

## 2023-05-19 ENCOUNTER — TREATMENT (OUTPATIENT)
Dept: PHYSICAL THERAPY | Facility: CLINIC | Age: 73
End: 2023-05-19
Payer: MEDICARE

## 2023-05-19 DIAGNOSIS — R13.12 OROPHARYNGEAL DYSPHAGIA: Primary | ICD-10-CM

## 2023-05-19 DIAGNOSIS — R47.1 DYSARTHRIA: ICD-10-CM

## 2023-05-19 NOTE — PROGRESS NOTES
3                                                                  Speech Language Pathology Treatment Note and Discharge Note  [address] [place] [address]    Patient: Fortino Dennis                                                                                     Visit Date: 2023  :     1950    Referring practitioner:    Alli Card Jr*  Date of Initial Visit:          Type: THERAPY  Noted: 3/23/2023    Patient seen for 6 sessions    Visit Diagnoses:    ICD-10-CM ICD-9-CM   1. Oropharyngeal dysphagia  R13.12 787.22   2. Dysarthria  R47.1 784.51     SUBJECTIVE     Patient stated he was cold and somewhat tired today. He wishes to pursue Vital Stim treatment for his swallowing. He does not want to continue with NMES and will need to go elsewhere for Vital Stim as it is not available here.      OBJECTIVE   GOALS  Goals                                            STG  Comments Status   Patient will improve oral skills to enhance safety and increase eating efficiency and bolus control as measured by increased lip closure, decreased anterior loss of bolus, improved bolus formation and improved management of secretions. Reviewed tongue push out and tongue push up exercises.Able to complete with minimal cues.  Discontinue   Patient will improve hyolaryngeal elevation, improve epiglottic inversion, improve laryngeal closure, improve UES opening, increase base of the tongue strength and posterior pharyngeal wall excursion and increase efficiency of cough to clear residue from the airway as measured by decreased overt signs and symptoms of aspiration and decreased penetration and aspiration on repeat instrumental swallow study, if applicable.   Reviewed effortful swallow, supraglottic swallow, and CTAR for swallowing exercises. Patient needed minimal cues. EMST has arrived, he did not bring it today, he has not been using it. He did not bring it in for instruction today.     Patient had  requested estim therapy for swallowing and was cleared by Dr. Card. Trial of NMES completed today and patient did not feel that it gave him any 'reaction' and wishes to go somewhere else that can offer the Vital-Stim brand of estim.  SLP located a provider for him and will request new order from Dr. Card.  Discontinue   Patient will report decreased difficulty with swallowing and improved EAT-10 score. Initial Eat 10:  27 - Moderate  Discontinue   Patient will increase cheek and lip strength and increase spontaneous swallow of secretions in order to decrease drooling as measured by patient report of severity.  Reviewed lip press and pucker exercises. Patient able to demonstrate with minimal cues.   Discontinue   Patient will increase strength and power of articulators so they can move more quickly and accurately to improve speech intelligibility as measured by accurate production of consonant sounds in 90% of speech sample. Worked on over articulation with CVC words with consonant clusters (initial and final Discontinue           LTG        Patient will safely consume some PO diet without aspiration or pneumonia while maintaining nutrition/hydration via alternate means.  Exercises ongoing. Patient needed minimal cues to complete exercises. NMES attempted. Could not achieve contraction at highest level on Aspire device. He wants to pursue Vital Stim elsewhere.  Discontinue   Patient will be able to engage in speech at the conversational level so that speech is understood by familiar /unfamiliar listeners 90% of the time after spontaneous repair.    Reviewed tongue exercises. Introduced overarticulation with CVC syllables with consonant clusters. Speech is improving but remains difficult to understand.  Discontinue                 Therapy Education/Self Care    Details: POC   Given Home Exercise Program   Progress: Reinforced   Education provided to:  Patient   Level of understanding Verbalized and  Demonstrated             Total Time of Visit:             45   mins         ASSESSMENT/PLAN     ASSESSMENT: Reviewed and introduced exercises for swallowing and speech. Trial with Estim (NMES). Unable to achieve contraction on highest level of Aspire device (A3-22).     PLAN: Discharge so that patient can pursue Vital Stim therapy at another facility as it is not available here. Continue therapy for dysarthria at new placement. Continue home exercises.     SIGNATURE: Sosa Membreno, CCC-SLP, [place] License #: [id number]  Electronically Signed on 5/19/2023    Thank you for this referral and for allowing us to participate in the care of this patient. He is certainly welcome to return for therapy should conditions warrant.           [address] [address]  [phone]

## 2023-06-01 ENCOUNTER — TELEPHONE (OUTPATIENT)
Dept: OTOLARYNGOLOGY | Facility: CLINIC | Age: 73
End: 2023-06-01

## 2023-06-01 NOTE — TELEPHONE ENCOUNTER
Spoke to Denise at Dr Sierra office to check status of Surgical Clearance request. She is going to print the letter off in chart and give it to Dr Sierra.

## 2023-06-02 ENCOUNTER — OFFICE VISIT (OUTPATIENT)
Dept: INTERNAL MEDICINE | Facility: CLINIC | Age: 73
End: 2023-06-02

## 2023-06-02 ENCOUNTER — LAB (OUTPATIENT)
Dept: LAB | Facility: HOSPITAL | Age: 73
End: 2023-06-02
Payer: MEDICARE

## 2023-06-02 VITALS
HEART RATE: 56 BPM | OXYGEN SATURATION: 99 % | WEIGHT: 136 LBS | RESPIRATION RATE: 16 BRPM | HEIGHT: 73 IN | DIASTOLIC BLOOD PRESSURE: 72 MMHG | BODY MASS INDEX: 18.02 KG/M2 | SYSTOLIC BLOOD PRESSURE: 124 MMHG

## 2023-06-02 DIAGNOSIS — G47.00 INSOMNIA, UNSPECIFIED TYPE: ICD-10-CM

## 2023-06-02 DIAGNOSIS — Z00.00 PREVENTATIVE HEALTH CARE: ICD-10-CM

## 2023-06-02 DIAGNOSIS — K22.2 STENOSIS OF ESOPHAGUS: Primary | ICD-10-CM

## 2023-06-02 DIAGNOSIS — K22.2 STENOSIS OF ESOPHAGUS: ICD-10-CM

## 2023-06-02 DIAGNOSIS — Z85.819 HISTORY OF PHARYNGEAL CANCER: ICD-10-CM

## 2023-06-02 DIAGNOSIS — E03.9 ACQUIRED HYPOTHYROIDISM: ICD-10-CM

## 2023-06-02 LAB
ANION GAP SERPL CALCULATED.3IONS-SCNC: 11 MMOL/L (ref 5–15)
BUN SERPL-MCNC: 9 MG/DL (ref 8–23)
BUN/CREAT SERPL: 21.4 (ref 7–25)
CALCIUM SPEC-SCNC: 9.5 MG/DL (ref 8.6–10.5)
CHLORIDE SERPL-SCNC: 94 MMOL/L (ref 98–107)
CO2 SERPL-SCNC: 27 MMOL/L (ref 22–29)
CREAT SERPL-MCNC: 0.42 MG/DL (ref 0.76–1.27)
DEPRECATED RDW RBC AUTO: 46.9 FL (ref 37–54)
EGFRCR SERPLBLD CKD-EPI 2021: 112.8 ML/MIN/1.73
ERYTHROCYTE [DISTWIDTH] IN BLOOD BY AUTOMATED COUNT: 12.5 % (ref 12.3–15.4)
GLUCOSE SERPL-MCNC: 91 MG/DL (ref 65–99)
HCT VFR BLD AUTO: 43.3 % (ref 37.5–51)
HCV AB SER DONR QL: NORMAL
HGB BLD-MCNC: 14 G/DL (ref 13–17.7)
MCH RBC QN AUTO: 32.9 PG (ref 26.6–33)
MCHC RBC AUTO-ENTMCNC: 32.3 G/DL (ref 31.5–35.7)
MCV RBC AUTO: 101.9 FL (ref 79–97)
PLATELET # BLD AUTO: 192 10*3/MM3 (ref 140–450)
PMV BLD AUTO: 8.1 FL (ref 6–12)
POTASSIUM SERPL-SCNC: 3.9 MMOL/L (ref 3.5–5.2)
RBC # BLD AUTO: 4.25 10*6/MM3 (ref 4.14–5.8)
SODIUM SERPL-SCNC: 132 MMOL/L (ref 136–145)
TSH SERPL DL<=0.05 MIU/L-ACNC: 4.03 UIU/ML (ref 0.27–4.2)
WBC NRBC COR # BLD: 4.75 10*3/MM3 (ref 3.4–10.8)

## 2023-06-02 PROCEDURE — 36415 COLL VENOUS BLD VENIPUNCTURE: CPT

## 2023-06-02 PROCEDURE — 84443 ASSAY THYROID STIM HORMONE: CPT

## 2023-06-02 PROCEDURE — 86803 HEPATITIS C AB TEST: CPT

## 2023-06-02 PROCEDURE — 85027 COMPLETE CBC AUTOMATED: CPT

## 2023-06-02 PROCEDURE — 80048 BASIC METABOLIC PNL TOTAL CA: CPT

## 2023-06-02 RX ORDER — ZOLPIDEM TARTRATE 10 MG/1
10 TABLET ORAL NIGHTLY PRN
Qty: 30 TABLET | Refills: 3 | Status: SHIPPED | OUTPATIENT
Start: 2023-06-16

## 2023-06-02 NOTE — PROGRESS NOTES
CC: surgery clearance prior to panendoscopy with dilation for dysphagia/Hx SCC of pharynx     HPI     Fortino Dennis is a 72 y.o. male presents for surgical clearance. He is feeling well today. He describes more difficulty with swallowing recently and is hopeful dilation will help symptoms to improve. He has been going to speech therapy but plans to contact [place] therapy to start vital stimulation which he found to be more helpful in the past. He purees foods and continues tube feedings TID.  He denies any recent fever or infection.        ROS:  Review of Systems   Constitutional:  Negative for fever.   HENT: Negative.     Respiratory:  Negative for apnea, cough, choking, chest tightness, shortness of breath, wheezing and stridor.    Cardiovascular:  Negative for chest pain, palpitations and leg swelling.   Gastrointestinal: Negative.    Genitourinary:  Negative for difficulty urinating.   Neurological:  Negative for dizziness, tremors, seizures, syncope, facial asymmetry, speech difficulty, weakness, light-headedness, numbness and headaches.   Psychiatric/Behavioral: Negative.          reports that he has never smoked. He has never been exposed to tobacco smoke. He has never used smokeless tobacco. He reports current alcohol use of about 3.0 standard drinks per week. He reports that he does not currently use drugs.    Current Outpatient Medications:     Acetaminophen (TYLENOL ARTHRITIS PAIN PO), Take  by mouth., Disp: , Rfl:     amLODIPine (NORVASC) 5 MG tablet, Take 1-2 tablets by mouth Daily., Disp: , Rfl:     atorvastatin (LIPITOR) 20 MG tablet, Take 1 tablet by mouth Daily., Disp: , Rfl:     donepezil (ARICEPT) 5 MG tablet, Take 1 tablet by mouth Every Night., Disp: , Rfl:     levothyroxine (SYNTHROID, LEVOTHROID) 50 MCG tablet, Take 1 tablet by mouth Daily., Disp: , Rfl:     SENNA PO, Take  by mouth Daily., Disp: , Rfl:     venlafaxine XR (Effexor XR) 150 MG 24 hr capsule, Take 1 capsule by mouth Daily.,  "Disp: 90 capsule, Rfl: 1    [START ON 6/16/2023] zolpidem (Ambien) 10 MG tablet, Take 1 tablet by mouth At Night As Needed for Sleep., Disp: 30 tablet, Rfl: 3    OBJECTIVE:  /72 (BP Location: Left arm, Patient Position: Sitting, Cuff Size: Other (Comment))   Pulse 56   Resp 16   Ht 185.4 cm (73\")   Wt 61.7 kg (136 lb)   SpO2 99%   BMI 17.94 kg/m²    Physical Exam  Constitutional:       General: He is not in acute distress.  Cardiovascular:      Rate and Rhythm: Regular rhythm. Bradycardia present.      Heart sounds: Normal heart sounds.   Pulmonary:      Breath sounds: Normal breath sounds.   Abdominal:      General: Bowel sounds are normal.      Tenderness: There is no abdominal tenderness.   Psychiatric:         Mood and Affect: Mood normal.         Behavior: Behavior normal.         Thought Content: Thought content normal.         Judgment: Judgment normal.       ASSESSMENT/PLAN:     Diagnoses and all orders for this visit:    1. Stenosis of esophagus (Primary)  2. History of pharyngeal cancer  -     CBC No Differential; Future  -     Basic metabolic panel; Future  -     ECG 12 Lead  RCRI risk is 0. Able to perform greater than or equal to 4 METS without cardiopulmonary compromise.  He/she has no symptoms of ACS, decompensated heart failure, symptomatic valvular disease, nor arrhythmia.  EKG and labs are reviewed as above and I believe he/she is optimized for surgery.  I would recommend moving forward with surgery without any further testing. Medication list reviewed, may hold Ambien night before the procedure.     3. Insomnia, unspecified type  -     zolpidem (Ambien) 10 MG tablet; Take 1 tablet by mouth At Night As Needed for Sleep.  Dispense: 30 tablet; Refill: 3    4. Preventative health care  -     Hepatitis C antibody; Future    Other orders  -     Pneumococcal Conjugate Vaccine 20-Valent (PCV20)    BMI is below normal parameters (malnutrition). Recommendations: treating the underlying disease " process       An After Visit Summary was printed and given to the patient at discharge.  Return in about 3 months (around 9/2/2023) for Recheck with Dr. Mariela leslie .          JONATHAN Schmidt 6/2/2023   Electronically signed.

## 2023-06-02 NOTE — LETTER
June 2, 2023       No Recipients    Patient: Fortino Dennis   YOB: 1950   Date of Visit: 6/2/2023       Dear Dr. Nichols Recipients:    Thank you for referring Fortino Dennis to me for evaluation. Below are the relevant portions of my assessment and plan of care.    If you have questions, please do not hesitate to call me. I look forward to following Fortino along with you.         Sincerely,        JONATHAN Arellano        CC:   No Recipients    Celestina Ortez APRN  06/02/23 1254  Sign when Signing Visit  Procedure    ECG 12 Lead    Date/Time: 6/2/2023 12:54 PM  Performed by: Celestina Ortez APRN  Authorized by: Celestina Ortez APRN   Rhythm: sinus bradycardia  Rate: bradycardic  Conduction: conduction normal  ST Segments: ST segments normal  T Waves: T waves normal  QRS axis: normal  Other: no other findings           Celestina Ortez APRN  06/02/23 1253  Sign when Signing Visit  CC: surgery clearance prior to panendoscopy with dilation for dysphagia/Hx SCC of pharynx     HPI     Fortino Dennis is a 72 y.o. male presents for surgical clearance. He is feeling well today. He describes more difficulty with swallowing recently and is hopeful dilation will help symptoms to improve. He has been going to speech therapy but plans to contact [place] therapy to start vital stimulation which he found to be more helpful in the past. He purees foods and continues tube feedings TID.  He denies any recent fever or infection.        ROS:  Review of Systems   Constitutional:  Negative for fever.   HENT: Negative.     Respiratory:  Negative for apnea, cough, choking, chest tightness, shortness of breath, wheezing and stridor.    Cardiovascular:  Negative for chest pain, palpitations and leg swelling.   Gastrointestinal: Negative.    Genitourinary:  Negative for difficulty urinating.   Neurological:  Negative for dizziness, tremors, seizures, syncope, facial asymmetry, speech  "difficulty, weakness, light-headedness, numbness and headaches.   Psychiatric/Behavioral: Negative.          reports that he has never smoked. He has never been exposed to tobacco smoke. He has never used smokeless tobacco. He reports current alcohol use of about 3.0 standard drinks per week. He reports that he does not currently use drugs.    Current Outpatient Medications:   •  Acetaminophen (TYLENOL ARTHRITIS PAIN PO), Take  by mouth., Disp: , Rfl:   •  amLODIPine (NORVASC) 5 MG tablet, Take 1-2 tablets by mouth Daily., Disp: , Rfl:   •  atorvastatin (LIPITOR) 20 MG tablet, Take 1 tablet by mouth Daily., Disp: , Rfl:   •  donepezil (ARICEPT) 5 MG tablet, Take 1 tablet by mouth Every Night., Disp: , Rfl:   •  levothyroxine (SYNTHROID, LEVOTHROID) 50 MCG tablet, Take 1 tablet by mouth Daily., Disp: , Rfl:   •  SENNA PO, Take  by mouth Daily., Disp: , Rfl:   •  venlafaxine XR (Effexor XR) 150 MG 24 hr capsule, Take 1 capsule by mouth Daily., Disp: 90 capsule, Rfl: 1  •  [START ON 6/16/2023] zolpidem (Ambien) 10 MG tablet, Take 1 tablet by mouth At Night As Needed for Sleep., Disp: 30 tablet, Rfl: 3    OBJECTIVE:  /72 (BP Location: Left arm, Patient Position: Sitting, Cuff Size: Other (Comment))   Pulse 56   Resp 16   Ht 185.4 cm (73\")   Wt 61.7 kg (136 lb)   SpO2 99%   BMI 17.94 kg/m²    Physical Exam  Constitutional:       General: He is not in acute distress.  Cardiovascular:      Rate and Rhythm: Regular rhythm. Bradycardia present.      Heart sounds: Normal heart sounds.   Pulmonary:      Breath sounds: Normal breath sounds.   Abdominal:      General: Bowel sounds are normal.      Tenderness: There is no abdominal tenderness.   Psychiatric:         Mood and Affect: Mood normal.         Behavior: Behavior normal.         Thought Content: Thought content normal.         Judgment: Judgment normal.       ASSESSMENT/PLAN:     Diagnoses and all orders for this visit:    1. Stenosis of esophagus " (Primary)  2. History of pharyngeal cancer  -     CBC No Differential; Future  -     Basic metabolic panel; Future  -     ECG 12 Lead  RCRI risk is 0. Able to perform greater than or equal to 4 METS without cardiopulmonary compromise.  He/she has no symptoms of ACS, decompensated heart failure, symptomatic valvular disease, nor arrhythmia.  EKG and labs are reviewed as above and I believe he/she is optimized for surgery.  I would recommend moving forward with surgery without any further testing. Medication list reviewed, may hold Ambien night before the procedure.     3. Insomnia, unspecified type  -     zolpidem (Ambien) 10 MG tablet; Take 1 tablet by mouth At Night As Needed for Sleep.  Dispense: 30 tablet; Refill: 3    4. Preventative health care  -     Hepatitis C antibody; Future    Other orders  -     Pneumococcal Conjugate Vaccine 20-Valent (PCV20)    BMI is below normal parameters (malnutrition). Recommendations: treating the underlying disease process       An After Visit Summary was printed and given to the patient at discharge.  Return in about 3 months (around 9/2/2023) for Recheck with Dr. Sierra - saturnino june .          JONATHAN Schmidt 6/2/2023   Electronically signed.

## 2023-06-02 NOTE — PROGRESS NOTES
Procedure     ECG 12 Lead    Date/Time: 6/2/2023 12:54 PM  Performed by: Celestina Ortez APRN  Authorized by: Celestina Ortez APRN   Rhythm: sinus bradycardia  Rate: bradycardic  Conduction: conduction normal  ST Segments: ST segments normal  T Waves: T waves normal  QRS axis: normal  Other: no other findings

## 2023-06-15 PROBLEM — C10.2: Status: ACTIVE | Noted: 2023-06-15

## 2023-06-15 PROBLEM — R13.12 OROPHARYNGEAL DYSPHAGIA: Status: ACTIVE | Noted: 2023-06-15

## 2023-06-15 PROBLEM — R13.11 ORAL PHASE DYSPHAGIA: Status: ACTIVE | Noted: 2023-06-15

## 2023-06-15 PROBLEM — K12.33 MUCOSITIS DUE TO RADIATION THERAPY: Status: ACTIVE | Noted: 2023-06-15

## 2023-06-15 PROBLEM — C14.0 SQUAMOUS CELL CARCINOMA OF PHARYNX: Status: ACTIVE | Noted: 2023-06-15

## 2023-07-24 ENCOUNTER — OFFICE VISIT (OUTPATIENT)
Dept: OTOLARYNGOLOGY | Facility: CLINIC | Age: 73
End: 2023-07-24
Payer: MEDICARE

## 2023-07-24 VITALS
HEART RATE: 61 BPM | HEIGHT: 73 IN | WEIGHT: 138 LBS | TEMPERATURE: 97 F | BODY MASS INDEX: 18.29 KG/M2 | SYSTOLIC BLOOD PRESSURE: 168 MMHG | DIASTOLIC BLOOD PRESSURE: 86 MMHG

## 2023-07-24 DIAGNOSIS — K22.2 ESOPHAGEAL STENOSIS: ICD-10-CM

## 2023-07-24 DIAGNOSIS — C14.0 SQUAMOUS CELL CARCINOMA OF PHARYNX: ICD-10-CM

## 2023-07-24 DIAGNOSIS — R25.2 TRISMUS: ICD-10-CM

## 2023-07-24 DIAGNOSIS — K12.33 MUCOSITIS DUE TO RADIATION THERAPY: ICD-10-CM

## 2023-07-24 DIAGNOSIS — R13.11 ORAL PHASE DYSPHAGIA: Primary | ICD-10-CM

## 2023-07-24 DIAGNOSIS — R13.12 OROPHARYNGEAL DYSPHAGIA: ICD-10-CM

## 2023-07-24 DIAGNOSIS — H21.511 SYNECHIA, ANTERIOR, RIGHT: ICD-10-CM

## 2023-07-24 DIAGNOSIS — E44.0 MODERATE PROTEIN-CALORIE MALNUTRITION: ICD-10-CM

## 2023-07-24 DIAGNOSIS — C10.2 MALIGNANT NEOPLASM OF LATERAL WALL OF OROPHARYNX: ICD-10-CM

## 2023-07-24 PROCEDURE — 3079F DIAST BP 80-89 MM HG: CPT | Performed by: OTOLARYNGOLOGY

## 2023-07-24 PROCEDURE — 99024 POSTOP FOLLOW-UP VISIT: CPT | Performed by: OTOLARYNGOLOGY

## 2023-07-24 PROCEDURE — 1160F RVW MEDS BY RX/DR IN RCRD: CPT | Performed by: OTOLARYNGOLOGY

## 2023-07-24 PROCEDURE — 3077F SYST BP >= 140 MM HG: CPT | Performed by: OTOLARYNGOLOGY

## 2023-07-24 PROCEDURE — 1159F MED LIST DOCD IN RCRD: CPT | Performed by: OTOLARYNGOLOGY

## 2023-07-24 NOTE — LETTER
July 24, 2023     Fortino Sierra DO  [address]  [address]  [place] [address]    Patient: Fortino Dennis   YOB: 1950   Date of Visit: 7/24/2023     Dear Fortino Sierra DO:       Thank you for referring Fortino Dennis to me for evaluation. Below are the relevant portions of my assessment and plan of care.    If you have questions, please do not hesitate to call me. I look forward to following Fortino along with you.         Sincerely,        Alli Card Jr, MD        CC: No Recipients    Alli Card Jr., MD  07/24/23 1359  Sign when Signing Visit      Alli Card Jr, MD  [facility] & [facility]  [address], [address]  [facility] [place] [address]  Fax [phone]  Phone [phone]      Visit Type: POST-OP   Chief Complaint   Patient presents with   • recheck throat and swallowing        HPI  Accompanied by: Friend  Fortino Dennis is a 72 y.o.  male who presents for follow up s/p Direct Laryngoscopy, Esophagoscopy, Bronchoscopy, Microdirect Laryngoscopy,or Esophageal Dilatation 38-48 - Bilateral and Microdirect Laryngoscopy - Bilateral on 7/7/2023. The patient's postoperative course was complicated by palate issues, swallowing is better.    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   • Throat cancer    • Thyroid disorder        Past Surgical History:   Procedure Laterality Date   • CATARACT EXTRACTION WITH INTRAOCULAR  LENS IMPLANT Bilateral     pt states x3 surgeries   • LARYNGOSCOPY Bilateral 7/7/2023    Procedure: MICRODIRECT LARYNGOSCOPY;  Surgeon: Alli Card Jr., MD;  Location: [facility] OR;  Service: ENT;  Laterality: Bilateral;   • 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   • MANDIBLE SURGERY  2021    removal of necrotic jaw (part of 1 of 2 surgeries)   • MASTECTOMY Left     removal of breast due to benign growth   • PANENDOSCOPY Bilateral 7/7/2023    Procedure: DIRECT LARYNGOSCOPY, ESOPHAGOSCOPY, BRONCHOSCOPY, MICRODIRECT LARYNGOSCOPY,OR ESOPHAGEAL DILATATION 38-48;  Surgeon: Alli Card Jr., MD;  Location: [facility] OR;  Service: ENT;  Laterality: Bilateral;   • THROAT SURGERY  2007    cancerous mass removed       Family History: His family history includes Cancer in his mother; Colon cancer in his mother; Dementia in his mother; Hypertension in his mother.     Social History: He  reports that he has never smoked. He has never been exposed to tobacco smoke. He has never used smokeless tobacco. He reports current alcohol use of about 3.0 standard drinks per week. He reports that he does not currently use drugs.    Home Medications:  Acetaminophen, Senna, amLODIPine, atorvastatin, donepezil, levothyroxine, venlafaxine XR, and zolpidem    Allergies:  He has No Known Allergies.       Vital Signs:   Temp:  [97 °F (36.1 °C)] 97 °F (36.1 °C)  Heart Rate:  [61] 61  BP: (168)/(86) 168/86  ENT Physical Exam  Constitutional  Appearance: patient appears well-developed and well-nourished,  Communication/Voice: communication appropriate for developmental age; Communication comments: Mild velopharyngeal scan  Head and Face  Appearance: head appears normal, face appears normal and face appears atraumatic;  Palpation: facial palpation normal;  Salivary: glands normal;  Ear  Hearing: intact;  Auricles: bilateral auricles normal;  External Mastoids: right external mastoid normal; left external mastoid  normal;  Ear Canals: bilateral ear canals normal;  Tympanic Membranes: bilateral tympanic membranes normal;  Nose  External Nose: nares patent bilaterally; external nose normal;  Internal Nose: nasal mucosa normal; Nasal mucosa comments: R side mid synecia, very thin   nasal septal deviation present; deviation is to the left, septal deviation is severe; Septum comments: R to L mid severe   bilateral inferior turbinates edematous and erythematous;  Oral Cavity/Oropharynx  Lips: normal;  Teeth: missing teeth (L mandible with impnat) noted;  Gums: gingival recession present; Gum comments: gingivits  Tongue: surface is coated and smooth; Oral Tongue comments: very dry  Oral mucosa: mucous membranes dry; buccal mucosa Buccal Mucosa comments: thickened secretions  Hard palate: normal;  Soft palate: normal;  Tonsils: bilateral tonsils 1+, Tonsil comments: dry and coated  OC/OP comments: Trismus-opens to approximately 18 to 19 mm at the incisors  Neck  Neck: scars (Left neck dissection scar, well-healed) present; neck asymmetric (Status post left neck dissection); no neck mass present;  Thyroid: no thyroid mass present;  Neck comments: Left neck, well-healed  Very atrophic musculature of the right neck  Brawny induration left greater than right  Poor laryngeal elevation  Respiratory  Inspection: breathing unlabored; normal breathing rate;  Cardiovascular  Inspection: extremities are warm and well perfused; no peripheral edema present;  Lymphatic  Palpation: no cervical adenopathy noted;  Neurovestibular  Mental Status: alert and oriented;  Psychiatric: mood normal; affect is appropriate;  Drawings         Result Review    RESULTS REVIEW    I have reviewed the patients old records in the chart.   I have reviewed the patients old records in the chart.     Assessment & Plan    Diagnoses and all orders for this visit:    1. Oral phase dysphagia (Primary)  Comments:  Unchanged  Overview:  Added automatically from request for  surgery [id number]      2. Oropharyngeal dysphagia  Comments:  Slightly improved  Overview:  Added automatically from request for surgery [id number]      3. Moderate protein-calorie malnutrition  Comments:  Stable    4. Squamous cell carcinoma of pharynx  Comments:  No recurrence  Overview:  Added automatically from request for surgery [id number]      5. 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 Synechia, anterior, right    7. 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 Esophageal stenosis  Comments:  Improved after dilatation    9. Trismus  Comments:  From mandibular surgery scarring       Resume preoperative activity and medications.  Patient swallowing has slightly improved.  His trismus remains the same.  He complains of swallowing difficulties.  He has used the stem in the past.  He wishes referral to [place] for V stem speech therapy.  I will send him to [place] for the stem.  Neck stretching  SLP referral  Continue nutrition  Call for problems    My Chart:  Patient is using My Chart    Patient, Friend  understand(s) and agree(s) with the treatment plan as described.    Return in about 3 months (around 10/24/2023) for Recheck throat and sewallowing, flex scope.      Alli Card Jr, MD   07/24/23  13:50 CDT

## 2023-07-24 NOTE — PATIENT INSTRUCTIONS
V stim,ordered    Call for problems     CONTACT INFORMATION:  The main office phone number is [phone]. For emergencies after hours and on weekends, this number will convert over to our answering service and the on call provider will answer. Please try to keep non emergent phone calls/ questions to office hours 9am-5pm Monday through Friday.     [employer]  As an alternative, you can sign up and use the Epic MyChart system for more direct and quicker access for non emergent questions/ problems.  [facility] [employer] allows you to send messages to your doctor, view your test results, renew your prescriptions, schedule appointments, and more. To sign up, go to [employer] and click on the Sign Up Now link in the New User? box. Enter your [employer] Activation Code exactly as it appears below along with the last four digits of your Social Security Number and your Date of Birth () to complete the sign-up process. If you do not sign up before the expiration date, you must request a new code.    [employer] Activation Code: Activation code not generated  Current [employer] Status: Active    If you have questions, you can email Q [email or web address] or call [phone] to talk to our [employer] staff. Remember, [employer] is NOT to be used for urgent needs. For medical emergencies, dial 911.

## 2023-07-24 NOTE — PROGRESS NOTES
Alli Card Jr, MD  [facility] [place] EAR NOSE & THROAT  [address], [address]  [place] [place] [address]  Fax [phone]  Phone [phone]      Visit Type: POST-OP   Chief Complaint   Patient presents with    recheck throat and swallowing        HPI  Accompanied by: Friend  Fortino Dennis is a 72 y.o.  male who presents for follow up s/p Direct Laryngoscopy, Esophagoscopy, Bronchoscopy, Microdirect Laryngoscopy,or Esophageal Dilatation 38-48 - Bilateral and Microdirect Laryngoscopy - Bilateral on 7/7/2023. The patient's postoperative course was complicated by palate issues, swallowing is better.    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    Throat cancer     Thyroid disorder        Past Surgical History:   Procedure Laterality Date    CATARACT EXTRACTION WITH INTRAOCULAR LENS IMPLANT Bilateral     pt states x3 surgeries    LARYNGOSCOPY Bilateral 7/7/2023    Procedure: MICRODIRECT LARYNGOSCOPY;  Surgeon: Alli Card Jr., MD;  Location: [facility];  Service: ENT;  Laterality: Bilateral;    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    MANDIBLE SURGERY  2021    removal of necrotic jaw (part of 1 of 2 surgeries)    MASTECTOMY Left     removal of breast due to benign growth    PANENDOSCOPY Bilateral 7/7/2023    Procedure: DIRECT LARYNGOSCOPY, ESOPHAGOSCOPY, BRONCHOSCOPY, MICRODIRECT LARYNGOSCOPY,OR ESOPHAGEAL DILATATION 38-48;  Surgeon: Alli Card Jr., MD;   Location: [facility];  Service: ENT;  Laterality: Bilateral;    THROAT SURGERY  2007    cancerous mass removed       Family History: His family history includes Cancer in his mother; Colon cancer in his mother; Dementia in his mother; Hypertension in his mother.     Social History: He  reports that he has never smoked. He has never been exposed to tobacco smoke. He has never used smokeless tobacco. He reports current alcohol use of about 3.0 standard drinks per week. He reports that he does not currently use drugs.    Home Medications:  Acetaminophen, Senna, amLODIPine, atorvastatin, donepezil, levothyroxine, venlafaxine XR, and zolpidem    Allergies:  He has No Known Allergies.       Vital Signs:   Temp:  [97 °F (36.1 °C)] 97 °F (36.1 °C)  Heart Rate:  [61] 61  BP: (168)/(86) 168/86  ENT Physical Exam  Constitutional  Appearance: patient appears well-developed and well-nourished,  Communication/Voice: communication appropriate for developmental age; Communication comments: Mild velopharyngeal scan  Head and Face  Appearance: head appears normal, face appears normal and face appears atraumatic;  Palpation: facial palpation normal;  Salivary: glands normal;  Ear  Hearing: intact;  Auricles: bilateral auricles normal;  External Mastoids: right external mastoid normal; left external mastoid normal;  Ear Canals: bilateral ear canals normal;  Tympanic Membranes: bilateral tympanic membranes normal;  Nose  External Nose: nares patent bilaterally; external nose normal;  Internal Nose: nasal mucosa normal; Nasal mucosa comments: R side mid synecia, very thin   nasal septal deviation present; deviation is to the left, septal deviation is severe; Septum comments: R to L mid severe   bilateral inferior turbinates edematous and erythematous;  Oral Cavity/Oropharynx  Lips: normal;  Teeth: missing teeth (L mandible with impnat) noted;  Gums: gingival recession present; Gum comments: gingivits  Tongue: surface is coated and smooth;  Oral Tongue comments: very dry  Oral mucosa: mucous membranes dry; buccal mucosa Buccal Mucosa comments: thickened secretions  Hard palate: normal;  Soft palate: normal;  Tonsils: bilateral tonsils 1+, Tonsil comments: dry and coated  OC/OP comments: Trismus-opens to approximately 18 to 19 mm at the incisors  Neck  Neck: scars (Left neck dissection scar, well-healed) present; neck asymmetric (Status post left neck dissection); no neck mass present;  Thyroid: no thyroid mass present;  Neck comments: Left neck, well-healed  Very atrophic musculature of the right neck  Brawny induration left greater than right  Poor laryngeal elevation  Respiratory  Inspection: breathing unlabored; normal breathing rate;  Cardiovascular  Inspection: extremities are warm and well perfused; no peripheral edema present;  Lymphatic  Palpation: no cervical adenopathy noted;  Neurovestibular  Mental Status: alert and oriented;  Psychiatric: mood normal; affect is appropriate;  Drawings         Result Review    RESULTS REVIEW    I have reviewed the patients old records in the chart.   I have reviewed the patients old records in the chart.     Assessment & Plan    Diagnoses and all orders for this visit:    1. Oral phase dysphagia (Primary)  Comments:  Unchanged  Overview:  Added automatically from request for surgery [id number]      2. Oropharyngeal dysphagia  Comments:  Slightly improved  Overview:  Added automatically from request for surgery [id number]      3. Moderate protein-calorie malnutrition  Comments:  Stable    4. Squamous cell carcinoma of pharynx  Comments:  No recurrence  Overview:  Added automatically from request for surgery [id number]      5. 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 Synechia, anterior, right    7. 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 Esophageal stenosis  Comments:  Improved after dilatation    9. Trismus  Comments:  From mandibular surgery scarring       Resume preoperative activity and medications.  Patient swallowing has slightly improved.  His trismus remains the same.  He complains of swallowing difficulties.  He has used the stem in the past.  He wishes referral to [place] for V stem speech therapy.  I will send him to [place] for the stem.  Neck stretching  SLP referral  Continue nutrition  Call for problems    My Chart:  Patient is using My Chart    Patient, Friend  understand(s) and agree(s) with the treatment plan as described.    Return in about 3 months (around 10/24/2023) for Recheck throat and sewallowing, flex scope.      Alli Card Jr, MD   07/24/23  13:50 CDT

## 2023-07-25 DIAGNOSIS — K12.33 MUCOSITIS DUE TO RADIATION THERAPY: ICD-10-CM

## 2023-07-25 DIAGNOSIS — R13.11 ORAL PHASE DYSPHAGIA: Primary | ICD-10-CM

## 2023-07-25 DIAGNOSIS — C14.0 SQUAMOUS CELL CARCINOMA OF PHARYNX: ICD-10-CM

## 2023-07-25 DIAGNOSIS — R13.12 OROPHARYNGEAL DYSPHAGIA: ICD-10-CM

## 2023-09-07 ENCOUNTER — OFFICE VISIT (OUTPATIENT)
Dept: INTERNAL MEDICINE | Facility: CLINIC | Age: 73
End: 2023-09-07
Payer: MEDICARE

## 2023-09-07 VITALS
SYSTOLIC BLOOD PRESSURE: 107 MMHG | BODY MASS INDEX: 17.41 KG/M2 | DIASTOLIC BLOOD PRESSURE: 63 MMHG | HEART RATE: 72 BPM | WEIGHT: 131.4 LBS | HEIGHT: 73 IN | RESPIRATION RATE: 16 BRPM | OXYGEN SATURATION: 96 %

## 2023-09-07 DIAGNOSIS — E78.2 MIXED HYPERLIPIDEMIA: ICD-10-CM

## 2023-09-07 DIAGNOSIS — C14.0 SQUAMOUS CELL CARCINOMA OF PHARYNX: ICD-10-CM

## 2023-09-07 DIAGNOSIS — E03.9 ACQUIRED HYPOTHYROIDISM: ICD-10-CM

## 2023-09-07 DIAGNOSIS — R53.81 PHYSICAL DECONDITIONING: ICD-10-CM

## 2023-09-07 DIAGNOSIS — E44.0 MODERATE PROTEIN-CALORIE MALNUTRITION: ICD-10-CM

## 2023-09-07 DIAGNOSIS — R63.4 WEIGHT LOSS, ABNORMAL: Primary | ICD-10-CM

## 2023-09-07 DIAGNOSIS — R53.83 OTHER FATIGUE: ICD-10-CM

## 2023-09-07 DIAGNOSIS — F33.1 MODERATE EPISODE OF RECURRENT MAJOR DEPRESSIVE DISORDER: ICD-10-CM

## 2023-09-07 DIAGNOSIS — Z51.81 ENCOUNTER FOR THERAPEUTIC DRUG MONITORING: ICD-10-CM

## 2023-09-07 DIAGNOSIS — R26.81 GAIT INSTABILITY: ICD-10-CM

## 2023-09-07 DIAGNOSIS — G47.00 INSOMNIA, UNSPECIFIED TYPE: ICD-10-CM

## 2023-09-07 RX ORDER — VENLAFAXINE HYDROCHLORIDE 150 MG/1
150 CAPSULE, EXTENDED RELEASE ORAL DAILY
Qty: 90 CAPSULE | Refills: 1 | Status: SHIPPED | OUTPATIENT
Start: 2023-09-07

## 2023-09-07 RX ORDER — ZOLPIDEM TARTRATE 10 MG/1
10 TABLET ORAL NIGHTLY PRN
Qty: 30 TABLET | Refills: 3 | Status: CANCELLED | OUTPATIENT
Start: 2023-09-07

## 2023-09-07 RX ORDER — ATORVASTATIN CALCIUM 20 MG/1
20 TABLET, FILM COATED ORAL DAILY
Qty: 90 TABLET | Refills: 3 | Status: SHIPPED | OUTPATIENT
Start: 2023-09-07

## 2023-09-07 NOTE — PROGRESS NOTES
CC: weight loss and fatigue    History:  Fortino Dennis is a 72 y.o. male   He notes he has not been doing well and feels his blood pressure may be high.  He feels as though he does not have energy and is not progressing toward getting better.  He has lost 7 or 8 pounds since his last visit at ENT and indicates he is typically eating 2 meals a day with no administration of tube feeding.  Yesterday, he ate only once and did administer 2 cans of tube feeding.  However, he continues to have speech therapy, though he is unsure he wants to return to the place he was going because of a bad interaction with one of their staff.      ROS:  Review of Systems   Constitutional:  Positive for appetite change, fatigue and unexpected weight change.   Respiratory:  Negative for shortness of breath.    Cardiovascular:  Negative for chest pain.      reports that he has never smoked. He has never been exposed to tobacco smoke. He has never used smokeless tobacco. He reports current alcohol use of about 3.0 standard drinks per week. He reports that he does not currently use drugs.      Current Outpatient Medications:     Acetaminophen (TYLENOL ARTHRITIS PAIN PO), Take 0.5 tablets by mouth Daily As Needed., Disp: , Rfl:     amLODIPine (NORVASC) 5 MG tablet, Take 1 tablet by mouth 2 (Two) Times a Week., Disp: , Rfl:     atorvastatin (LIPITOR) 20 MG tablet, Take 1 tablet by mouth Daily., Disp: , Rfl:     donepezil (ARICEPT) 5 MG tablet, Take 1 tablet by mouth Every Night., Disp: , Rfl:     levothyroxine (SYNTHROID, LEVOTHROID) 50 MCG tablet, Take 1 tablet by mouth Daily., Disp: , Rfl:     SENNA PO, Take 1 capsule by mouth Daily., Disp: , Rfl:     venlafaxine XR (Effexor XR) 150 MG 24 hr capsule, Take 1 capsule by mouth Daily., Disp: 90 capsule, Rfl: 1    zolpidem (Ambien) 10 MG tablet, Take 1 tablet by mouth At Night As Needed for Sleep., Disp: 30 tablet, Rfl: 3    OBJECTIVE:  /63 (BP Location: Left arm, Patient Position: Sitting,  "Cuff Size: Adult)   Pulse 72   Resp 16   Ht 185.4 cm (73\")   Wt 59.6 kg (131 lb 6.4 oz)   SpO2 96%   BMI 17.34 kg/m²    Physical Exam  Constitutional:       General: He is not in acute distress.  Pulmonary:      Effort: Pulmonary effort is normal. No respiratory distress.   Neurological:      Mental Status: He is alert and oriented to person, place, and time.   Psychiatric:         Mood and Affect: Mood normal.         Behavior: Behavior normal.       Patient has been erroneously marked as diabetic. Based on the available clinical information, he does not have diabetes and should therefore be excluded from diabetic health maintenance and quality measures for the remainder of the reporting period.    Assessment/Plan     Diagnoses and all orders for this visit:    1. Weight loss, abnormal (Primary)  2. Moderate protein-calorie malnutrition  -     Ambulatory Referral to Physical Therapy Evaluate and treat; Stretching, ROM, Strengthening  Encouraged using at least 1-2 cans of tube feeding daily as he has lost 8 pound in the last 4-5 months. He is not able to eat to his satisfaction yet, but does not enjoy burping the tube feeding when he administers this and prefers not to do it.     3. Other fatigue  -     Urinalysis With Culture If Indicated -; Future  Suspect possible multiple etiologies, but nutrition difficulties are near the top of the list. Recommend increasing tube feeding and also check labs to consider alternative etiologies.     4. Acquired hypothyroidism  -     TSH; Future    5. Moderate episode of recurrent major depressive disorder  -     venlafaxine XR (Effexor XR) 150 MG 24 hr capsule; Take 1 capsule by mouth Daily.  Dispense: 90 capsule; Refill: 1  Fair control on SNRI, which is continued.     6. Insomnia, unspecified type  -     Compliance Drug Analysis, Ur - Urine, Clean Catch; Future  UDS to comply with ongoing ambien prescription.     7. Mixed hyperlipidemia  -     Lipid panel; Future  -     " Basic metabolic panel; Future  -     atorvastatin (LIPITOR) 20 MG tablet; Take 1 tablet by mouth Daily.  Dispense: 90 tablet; Refill: 3  Stable on moderate intensity statin therapy per ACC/AHA guidelines.    8. Encounter for therapeutic drug monitoring  -     Compliance Drug Analysis, Ur - Urine, Clean Catch; Future    9. Gait instability  10. Physical deconditioning  11. Squamous cell carcinoma of pharynx  -     Ambulatory Referral to Physical Therapy Evaluate and treat; Stretching, ROM, Strengthening  Referral to PT at his request to supplement his SLP as well to increase strength and conditioning.       An After Visit Summary was printed and given to the patient at discharge.  Return in about 4 months (around 1/7/2024) for Recheck.      Fortino Sierra D.O. 9/7/2023   Electronically signed.

## 2023-09-19 ENCOUNTER — TELEPHONE (OUTPATIENT)
Dept: OTOLARYNGOLOGY | Facility: CLINIC | Age: 73
End: 2023-09-19

## 2023-09-19 NOTE — TELEPHONE ENCOUNTER
[facility] received a fax that requires your attention. The document has been indexed to the patient’s chart for your review.      Reason for sending: PLAN OF CARE REQUESTING SIGNATURE AND RESPONSE    Documents Description: PLAN OF CARE-[facility]-08.03.23    Name of Sender: [facility]    Date Indexed: 09.19.23    Notes (if needed):

## 2023-10-17 NOTE — MBS/VFSS/FEES
----- Message from Heydi Giordano MD sent at 10/16/2023  4:13 PM CDT -----  Wonderful news. Normal bone density. That's glorious.. carry on as you were with these healthy bones   Speech Language Pathology   [facility] [place]  [facility]       Patient Name: Fortino Dennis  : 1950  MRN: [id number]    Today's Date: 3/29/2023      Visit Date: 2023     SPEECH-LANGUAGE PATHOLOGY EVALUTION - VFSS  Subjective: The patient was seen on this date for a VFSS(Videofluoroscopic Swallowing Study).  Patient was alert and cooperative.    Significant history: History of left sided head and neck cancer s/p chemotherapy and XRT in . 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 Patient reports prior FEES and vital stim therapy. He currently has a feeding tube but is attempting blended items and beer by mouth at home. He reports prior esophageal dilations. He does indicate that his swallowing seems to have worsened recently. No report of increased congestion, pnuemonia, hospitalizations, or unexplained fever.  Objective: Risks/benefits were reviewed with the patient, and consent was obtained. The study was completed with SLP and Radiologist present. The patient was seen in lateral view(s). Textures given included thin liquid, nectar thick liquid, puree consistency and mechanical soft consistency.  Assessment:   The following consistencies were given in the stated order; nectar thick, thin, pudding thick, thin, soft solids, and thin with attempted left head turn.     Nectar thick: Premature spillage to the pyriforms. It is difficult to determine when swallow is initiated due to global weakness and poor to no laryngeal elevation. Piecemeal swallow with mild to moderate vallecular and pyriform residue. Mild undercoating of the epiglottis. At least x2 multiple swallow.     Thin: Premature spillage to the pyriforms. Deep penetration during the swallow. Patient did take consecutive sips.  Mild residue along posterior pharyngeal wall and within pyriforms. Undercoating of the epiglottis with trace aspiration during multiple swallow. Cued cough and clears residue.     Pudding  thick: Premature spillage to vallecula. Severe vallecular and pyriform residue with minimal clearance with first swallow. Trace penetration of pyriform residue. Multiple swallows and clear to moderate pyriform residue.     Thin: Deep penetration with trace aspiration during. Able to cough and clear with cue.     Soft solid: Moderate pyriform residue. Clear with x5 multiple swallow to minimal amount.     Thin with attempted left head turn: Patient unable to complete successfully, he completed more of a chin tuck than head turn. Continued with penetration.     Across of trials the patient presented with globally weak swallow with poor elevation, poor epiglottic inversion, and poor posterior stripping wave.      Esophageal screen was performed and demonstrated delayed esophageal clearance.  SLP Findings: Patient presents with moderate to severe oropharyngeal dysphagia.   Recommendations: Continue PEG as main source of nutrition. Patient is following with OP SLP currently. Under close observation but OP SLP, patient would likely be able to tolerate soft solids with thin liquids in small amounts. He would need to complete small bites/sips, multiple swallows, and volitional throat clear/cough following liquid intake throughout meal. Will let OP SLP further advice and monitor progression of diet.  Medications should be taken  by alternate means. May have water and Ice between meals after oral care, under staff or family supervision and with the recommended strategies for safe swallowing.  Recommended Strategies: Upright for PO, small bites and sips and double swallow with all intake . Oral care before breakfast, after all meals and PRN.  Other Recommended Evaluations: Continue OP speech and repeat VFSS pending progress as needed     Dysphagia therapy is recommended. Rationale: See above.        Visit Dx:     ICD-10-CM ICD-9-CM   1. Oropharyngeal dysphagia  R13.12 787.22       Patient Active Problem List   Diagnosis   •  Primary hypertension   • Hyperlipidemia   • Stenosis of esophagus   • History of pharyngeal cancer   • Moderate protein-calorie malnutrition (HCC)   • Acquired hypothyroidism   • Moderate episode of recurrent major depressive disorder (HCC)        Past Medical History:   Diagnosis Date   • Acquired hypothyroidism    • Anxiety    • Arthritis    • Depression    • Essential hypertension    • GERD (gastroesophageal reflux disease)    • Hiatal hernia    • Hyperlipidemia    • Hypertension    • Memory problem     aricept   • Mixed hyperlipidemia    • Osteonecrosis of jaw (HCC)    • Throat cancer (HCC)    • Thyroid disorder         No past surgical history on file.                   SLP Adult Swallow Evaluation     Row Name 03/29/23 0900       Rehab Evaluation    Document Type evaluation  -MG    Subjective Information no complaints  -MG    Patient Observations alert;agree to therapy;cooperative  -MG    Patient/Family/Caregiver Comments/Observations Brother present in waiting room.  -MG    Patient Effort good  -MG    Symptoms Noted During/After Treatment none  -MG       General Information    Patient Profile Reviewed yes  -MG    Pertinent History Of Current Problem History of left sided head and neck cancer s/p chemotherapy and XRT in 2007. 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 Patient reports prior FEES and vital stim therapy. He currently has a feeding tube but is attempting blended items and beer by mouth at home. He reports prior esophageal dilations. He does indicate that his swallowing seems to have worsened recently. No report of increased congestion, pnuemonia, hospitalizations, or unexplained fever.  -MG    Current Method of Nutrition gastrostomy feedings  blended foods and thin liquids  -MG    Precautions/Limitations, Vision WFL;for purposes of eval  -MG    Precautions/Limitations, Hearing WFL;for purposes of eval  -MG    Prior Level of Function-Communication WFL  -MG    Prior Level of  Function-Swallowing concerns regarding dehydration;concerns regarding malnutrition  secondary to history listed above  -MG    Plans/Goals Discussed with patient;other (see comments)  OP SLP  -MG    Barriers to Rehab previous functional deficit  -MG    Patient's Goals for Discharge eat/drink without coughing/choking  -MG       Pain    Additional Documentation Pain Scale: FACES Pre/Post-Treatment (Group)  -MG       Pain Scale: FACES Pre/Post-Treatment    Pain: FACES Scale, Pretreatment 0-->no hurt  -MG    Posttreatment Pain Rating 0-->no hurt  -MG       MBS/VFSS    Utensils Used spoon;straw  -MG    Consistencies Trialed nectar/syrup-thick liquids;thin liquids;pudding thick;soft to chew textures  -MG       MBS/VFSS Interpretation    Oral Prep Phase WFL  -MG    Oral Transit Phase WFL  -MG    Oral Residue WFL  -MG    VFSS Summary See note.  -MG       Initiation of Pharyngeal Swallow    Initiation of Pharyngeal Swallow --  difficult to determine due to global weakness  -MG    Pharyngeal Phase impaired pharyngeal phase of swallowing  -MG    Penetration During the Swallow thin liquids;nectar-thick liquids;secondary to delayed swallow initiation or mistiming;secondary to reduced laryngeal elevation;secondary to reduced vestibular closure  -MG    Aspiration During the Swallow thin liquids;secondary to delayed swallow initiation or mistiming;secondary to reduced vestibular closure;secondary to reduced laryngeal elevation  -MG    Penetration After the Swallow pudding/puree;secondary to residue  -MG       SLP Evaluation Clinical Impression    SLP Swallowing Diagnosis moderate;oral dysphagia;pharyngeal dysphagia  -MG    Functional Impact risk of aspiration/pneumonia  -MG    Rehab Potential/Prognosis, Swallowing adequate, monitor progress closely  -MG    Swallow Criteria for Skilled Therapeutic Interventions Met other (see comments)  OP services  -MG       Recommendations    Therapy Frequency (Swallow) evaluation only  -MG    SLP  Diet Recommendation long term alternate methods of nutrition/hydration  continue main source of nutrition via PEG; PO as tolerated for pleasure soft solids and thin liquids  -MG    Recommended Precautions and Strategies upright posture during/after eating;small bites of food and sips of liquid;multiple swallows per bite of food;multiple swallows per sip of liquid;hard swallow with each bite or sip;volitional throat clear;general aspiration precautions;fatigue precautions  -MG    Oral Care Recommendations Oral Care before breakfast, after meals and PRN;Toothbrush  -MG    SLP Rec. for Method of Medication Administration meds via alternate route  -MG    Monitor for Signs of Aspiration yes;notify SLP if any concerns  -MG    Anticipated Discharge Disposition (SLP) home with OP services  -MG    Demonstrates Need for Referral to Another Service speech therapy  currently seeing  -MG          User Key  (r) = Recorded By, (t) = Taken By, (c) = Cosigned By    Initials Name Provider Type    Alayna Tan MS CCC-SLP Speech and Language Pathologist                                OP SLP Education     Row Name 03/29/23 1401       Education    Barriers to Learning No barriers identified  -MG    Education Provided Described results of evaluation;Patient expressed understanding of evaluation  -MG    Assessed Learning needs;Learning motivation;Learning preferences;Learning readiness  -MG    Learning Motivation Strong  -MG    Learning Method Explanation  -MG    Teaching Response Verbalized understanding  -MG    Education Comments See note  -MG          User Key  (r) = Recorded By, (t) = Taken By, (c) = Cosigned By    Initials Name Effective Dates    Alayna Tan MS CCC-SLP 08/12/22 -                                    Time Calculation:   Untimed Charges  SLP Eval/Re-eval : ST Motion Fluoro Eval Swallow - 00702  [id number] Motion Fluoro Eval Swallow Minutes: 100  Total Minutes  Untimed Charges Total Minutes: 100   Total  Minutes: 100                Alayna Mckenzie, MS CCC-SLP  3/29/2023

## 2023-10-20 DIAGNOSIS — G47.00 INSOMNIA, UNSPECIFIED TYPE: ICD-10-CM

## 2023-10-20 RX ORDER — ZOLPIDEM TARTRATE 10 MG/1
10 TABLET ORAL NIGHTLY PRN
Qty: 30 TABLET | Refills: 3 | Status: SHIPPED | OUTPATIENT
Start: 2023-10-20

## 2023-10-20 NOTE — TELEPHONE ENCOUNTER
Caller: Fortino Dennis    Relationship: Self    Best call back number: [phone]     Requested Prescriptions:   Requested Prescriptions     Pending Prescriptions Disp Refills    zolpidem (Ambien) 10 MG tablet 30 tablet 3     Sig: Take 1 tablet by mouth At Night As Needed for Sleep.        Pharmacy where request should be sent: [id number] [place] [facility] [address] [facility] [phone] [facility] [phone]      Last office visit with prescribing clinician: 9/7/2023   Last telemedicine visit with prescribing clinician: Visit date not found   Next office visit with prescribing clinician: 1/8/2024     Additional details provided by patient: HAS ONE DAY WILL NOT HAVE ANY FOR THE WEEKEND     Does the patient have less than a 3 day supply:  [x] Yes  [] No    Would you like a call back once the refill request has been completed: [x] Yes [] No    If the office needs to give you a call back, can they leave a voicemail: [x] Yes [] No    Gamaliel Lowery Rep   10/20/23 11:08 CDT

## 2023-10-20 NOTE — TELEPHONE ENCOUNTER
Rx Refill Note  Requested Prescriptions     Pending Prescriptions Disp Refills    zolpidem (Ambien) 10 MG tablet 30 tablet 3     Sig: Take 1 tablet by mouth At Night As Needed for Sleep.      Last office visit with prescribing clinician: 9/7/2023   Last telemedicine visit with prescribing clinician: Visit date not found   Next office visit with prescribing clinician: 1/8/2024                         Would you like a call back once the refill request has been completed: [] Yes [] No    If the office needs to give you a call back, can they leave a voicemail: [] Yes [] No    YI Cooley  10/20/23, 11:33 CDT    Medication last filled 06/16/23, qty 30, 3 refills.

## 2023-10-25 ENCOUNTER — OFFICE VISIT (OUTPATIENT)
Dept: OTOLARYNGOLOGY | Facility: CLINIC | Age: 73
End: 2023-10-25
Payer: MEDICARE

## 2023-10-25 VITALS
WEIGHT: 131 LBS | SYSTOLIC BLOOD PRESSURE: 98 MMHG | RESPIRATION RATE: 16 BRPM | HEART RATE: 69 BPM | DIASTOLIC BLOOD PRESSURE: 65 MMHG | HEIGHT: 73 IN | BODY MASS INDEX: 17.36 KG/M2 | TEMPERATURE: 97.5 F

## 2023-10-25 DIAGNOSIS — K12.33 MUCOSITIS DUE TO RADIATION THERAPY: ICD-10-CM

## 2023-10-25 DIAGNOSIS — R13.11 ORAL PHASE DYSPHAGIA: Primary | ICD-10-CM

## 2023-10-25 DIAGNOSIS — E44.0 MODERATE PROTEIN-CALORIE MALNUTRITION: ICD-10-CM

## 2023-10-25 DIAGNOSIS — C14.0 SQUAMOUS CELL CARCINOMA OF PHARYNX: ICD-10-CM

## 2023-10-25 DIAGNOSIS — R13.12 OROPHARYNGEAL DYSPHAGIA: ICD-10-CM

## 2023-10-25 NOTE — LETTER
October 25, 2023     Fortino Sierra DO  [address]  [address]  [place] [facility] [address]    Patient: Fortino Dennis   YOB: 1950   Date of Visit: 10/25/2023     Dear Fortino Sierra DO:       Thank you for referring Fortino Dennis to me for evaluation. Below are the relevant portions of my assessment and plan of care.    If you have questions, please do not hesitate to call me. I look forward to following Fortino along with you.         Sincerely,        Alli Card Jr, MD        CC: No Recipients    Alli Card Jr., MD  10/25/23 1301  Sign when Signing Visit      Alli Card Jr, MD  [facility] [place] EAR NOSE & THROAT  [address], [address]  [place] [address]  Fax [phone]  Phone [phone]      Visit Type: FOLLOW UP   Chief Complaint   Patient presents with   • Difficulty Swallowing        HPI  Accompanied by: nO ONE  Fortino Dennis is a 72 y.o. male who presents for routine cancer follow up. hE WAS USING HOME EXERCISES. dID NOT LIKE aTLAS  for therapy.  He says no real improvement. Liked Vital.     Oncology History:  Oncology/Hematology History   Squamous cell carcinoma of pharynx   6/15/2023 Initial Diagnosis    Squamous cell carcinoma of pharynx     10/25/2023 - 10/25/2023 Other Event    ENT surveillance  Patient with no evidence of recurrent disease.  He has severe trismus  The left mandibular free flap appears intact  Neck has banding from prior multiple surgeries and radiation  Patient appears to be cancer free at this time.  He wishes continued surveillance.  Alli Card Jr, MD  10/25/2023  12:59 CDT           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  Initial staging: T1N0M0  Re-staging: none  Therapy: Surgery, RMND, XRT and Chemo  Completion therapy: 2007    Past Medical History:   Diagnosis Date   • Acquired hypothyroidism    • Anxiety    • Arthritis     • Dental disease    • Depression    • Essential hypertension    • GERD (gastroesophageal reflux disease)    • Headache 2010 first noticed   • Hiatal hernia    • Hyperlipidemia    • Hypertension    • Memory problem     aricept   • Mixed hyperlipidemia    • 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   • Sleep apnea    • Throat cancer    • Thyroid disorder    • Tinnitus 2020   • TMJ dysfunction 2020 wear        Past Surgical History:   Procedure Laterality Date   • CATARACT EXTRACTION WITH INTRAOCULAR LENS IMPLANT Bilateral     pt states x3 surgeries   • LARYNGOSCOPY Bilateral 7/7/2023    Procedure: MICRODIRECT LARYNGOSCOPY;  Surgeon: Alli Card Jr., MD;  Location:  [facility] OR;  Service: ENT;  Laterality: Bilateral;   • 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     Social History: He  reports that he has never smoked. He has never been exposed to tobacco smoke. He has never used smokeless tobacco. He reports current alcohol use of about 3.0 standard drinks of alcohol per week. He reports that he does not use drugs.    Home Medications:  Acetaminophen, Senna, amLODIPine, atorvastatin, donepezil, levothyroxine, venlafaxine XR, and zolpidem    Allergies:  He has No Known Allergies.       Vital Signs:   Temp:  [97.5 °F (36.4 °C)] 97.5 °F (36.4 °C)  Heart Rate:  [69] 69  Resp:  [16] 16  BP:  (98)/(65) 98/65  ENT Physical Exam  Constitutional  Appearance: patient appears well-developed, cachectic, patient is cooperative;  Communication/Voice: communication appropriate for developmental age; Communication comments: Mild velopharyngeal scan  Constitutional comments: Wearing coat and hoodie sweatshirt  Wearing gloves  Head and Face  Appearance: head appears normal, face appears normal and face appears atraumatic;  Palpation: facial palpation normal;  Salivary: glands normal;  Ear  Hearing: intact;  Auricles: bilateral auricles normal;  External Mastoids: right external mastoid normal; left external mastoid normal;  Ear Canals: bilateral ear canals normal;  Tympanic Membranes: bilateral tympanic membranes normal;  Nose  External Nose: nares patent bilaterally; external nose normal;  Internal Nose: nasal mucosa normal; Nasal mucosa comments: R side mid synecia, very thin   nasal septal deviation present; deviation is to the left, septal deviation is severe; Septum comments: R to L mid severe   bilateral inferior turbinates edematous and erythematous;  Oral Cavity/Oropharynx  Lips: normal;  Teeth: missing teeth (L mandible with impnat) noted;  Gums: gingival recession present; Gum comments: gingivits  Tongue: surface is coated and smooth; Oral Tongue comments: very dry  Oral mucosa: mucous membranes dry; buccal mucosa Buccal Mucosa comments: thickened secretions  Hard palate: normal;  Soft palate: normal;  Tonsils: bilateral tonsils 1+, Tonsil comments: dry and coated  OC/OP comments: Trismus-opens to approximately 18 mm at the incisors  Neck  Neck: scars (Left neck dissection scar, well-healed) present; neck asymmetric (Status post left neck dissection); no neck mass present;  Thyroid: no thyroid mass present;  Neck comments: Left neck, well-healed  Very atrophic musculature of the right neck  Brawny induration left greater than right  Poor laryngeal elevation  Banding of neck  muscles  Respiratory  Inspection: breathing unlabored; normal breathing rate;  Cardiovascular  Inspection: extremities are warm and well perfused; no peripheral edema present;  Lymphatic  Palpation: no cervical adenopathy noted;  Neurovestibular  Mental Status: alert and oriented;  Psychiatric: mood normal; affect is appropriate;  Drawings             Result Review   RESULTS REVIEW    I have reviewed the patients old records in the chart.   I have reviewed the patients old records in the chart.     Assessment & Plan   Diagnoses and all orders for this visit:    1. Oral phase dysphagia (Primary)  Comments:  Related to trismus and cancer therapy  Overview:  Added automatically from request for surgery [id number]      2. Oropharyngeal dysphagia  Comments:  Related to cancer therapy  Overview:  Added automatically from request for surgery [id number]    Orders:  -     Ambulatory Referral to Speech Therapy    3. Squamous cell carcinoma of pharynx  Comments:  No evidence of recurrence  Overview:  Added automatically from request for surgery [id number]      4. Mucositis due to radiation therapy  Overview:  Added automatically from request for surgery [id number]      5. Moderate protein-calorie malnutrition  Comments:  Because of dysphagia       Continue current management plan.  Patient continues with trismus and oral as well as oropharyngeal dysphagia.  I will refer to speech pathology here.  He did not do well with at less.  I am not sure what his problem was exactly.  I will refer for speech pathology here and hopefully we can continue with electrical stimulation if they have that in their repertoire.  Patient has no evidence of disease.  He really does not require any further cancer surveillance.  I will continue to follow for dysphagia.  However, because of the extensive nature of his disease therapy, I feel he has prognosis for recovery.  No medications  Speech pathology referral  Call for problems    Return in about 6 months  (around 4/25/2024) for Recheck Swallowing and trismus.      Electronically signed by Alli Card Jr, MD 10/25/23 12:10 PM CDT.

## 2023-10-25 NOTE — PATIENT INSTRUCTIONS
Speech therapy consult    Continue swallowing exercises at home    Call for problems     CONTACT INFORMATION:  The main office phone number is [phone]. For emergencies after hours and on weekends, this number will convert over to our answering service and the on call provider will answer. Please try to keep non emergent phone calls/ questions to office hours 9am-5pm Monday through Friday.     BlikBook  As an alternative, you can sign up and use the Epic MyChart system for more direct and quicker access for non emergent questions/ problems.  [facility] BlikBook allows you to send messages to your doctor, view your test results, renew your prescriptions, schedule appointments, and more. To sign up, go to CheckInPage and click on the Sign Up Now link in the New User? box. Enter your BlikBook Activation Code exactly as it appears below along with the last four digits of your Social Security Number and your Date of Birth () to complete the sign-up process. If you do not sign up before the expiration date, you must request a new code.    BlikBook Activation Code: Activation code not generated  Current BlikBook Status: Active    If you have questions, you can email [email or web address] or call [phone] to talk to our BlikBook staff. Remember, BlikBook is NOT to be used for urgent needs. For medical emergencies, dial 911.

## 2023-10-25 NOTE — PROGRESS NOTES
Alli Card Jr, MD  [facility] [place] [facility] EAR NOSE & THROAT  [address], [address]  [place] [facility] [address]  Fax [phone]  Phone [phone]      Visit Type: FOLLOW UP   Chief Complaint   Patient presents with    Difficulty Swallowing        HPI  Accompanied by: nO ONE  Fortino Dennis is a 72 y.o. male who presents for routine cancer follow up. hE WAS USING HOME EXERCISES. dID NOT LIKE aTLAS  for therapy.  He says no real improvement. Liked Vital.     Oncology History:  Oncology/Hematology History   Squamous cell carcinoma of pharynx   6/15/2023 Initial Diagnosis    Squamous cell carcinoma of pharynx     10/25/2023 - 10/25/2023 Other Event    ENT surveillance  Patient with no evidence of recurrent disease.  He has severe trismus  The left mandibular free flap appears intact  Neck has banding from prior multiple surgeries and radiation  Patient appears to be cancer free at this time.  He wishes continued surveillance.  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    Sleep apnea     Throat cancer     Thyroid disorder     Tinnitus 2020    TMJ dysfunction 2020 wear        Past Surgical History:   Procedure Laterality Date    CATARACT EXTRACTION WITH INTRAOCULAR LENS IMPLANT Bilateral     pt states x3 surgeries    LARYNGOSCOPY Bilateral 7/7/2023    Procedure: MICRODIRECT LARYNGOSCOPY;   Surgeon: Alli Card Jr., MD;  Location:  [facility];  Service: ENT;  Laterality: Bilateral;    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     Social History: He  reports that he has never smoked. He has never been exposed to tobacco smoke. He has never used smokeless tobacco. He reports current alcohol use of about 3.0 standard drinks of alcohol per week. He reports that he does not use drugs.    Home Medications:  Acetaminophen, Senna, amLODIPine, atorvastatin, donepezil, levothyroxine, venlafaxine XR, and zolpidem    Allergies:  He has No Known Allergies.       Vital Signs:   Temp:  [97.5 °F (36.4 °C)] 97.5 °F (36.4 °C)  Heart Rate:  [69] 69  Resp:  [16] 16  BP: (98)/(65) 98/65  ENT Physical Exam  Constitutional  Appearance: patient appears well-developed, cachectic, patient is cooperative;  Communication/Voice: communication appropriate for developmental age; Communication comments: Mild velopharyngeal scan  Constitutional comments: Wearing coat and hoodie sweatshirt  Wearing gloves  Head and Face  Appearance: head appears normal, face appears normal and face appears atraumatic;  Palpation: facial palpation normal;  Salivary: glands normal;  Ear  Hearing: intact;  Auricles: bilateral auricles normal;  External Mastoids: right external mastoid normal; left external mastoid normal;  Ear Canals:  bilateral ear canals normal;  Tympanic Membranes: bilateral tympanic membranes normal;  Nose  External Nose: nares patent bilaterally; external nose normal;  Internal Nose: nasal mucosa normal; Nasal mucosa comments: R side mid synecia, very thin   nasal septal deviation present; deviation is to the left, septal deviation is severe; Septum comments: R to L mid severe   bilateral inferior turbinates edematous and erythematous;  Oral Cavity/Oropharynx  Lips: normal;  Teeth: missing teeth (L mandible with impnat) noted;  Gums: gingival recession present; Gum comments: gingivits  Tongue: surface is coated and smooth; Oral Tongue comments: very dry  Oral mucosa: mucous membranes dry; buccal mucosa Buccal Mucosa comments: thickened secretions  Hard palate: normal;  Soft palate: normal;  Tonsils: bilateral tonsils 1+, Tonsil comments: dry and coated  OC/OP comments: Trismus-opens to approximately 18 mm at the incisors  Neck  Neck: scars (Left neck dissection scar, well-healed) present; neck asymmetric (Status post left neck dissection); no neck mass present;  Thyroid: no thyroid mass present;  Neck comments: Left neck, well-healed  Very atrophic musculature of the right neck  Brawny induration left greater than right  Poor laryngeal elevation  Banding of neck muscles  Respiratory  Inspection: breathing unlabored; normal breathing rate;  Cardiovascular  Inspection: extremities are warm and well perfused; no peripheral edema present;  Lymphatic  Palpation: no cervical adenopathy noted;  Neurovestibular  Mental Status: alert and oriented;  Psychiatric: mood normal; affect is appropriate;  Drawings             Result Review    RESULTS REVIEW    I have reviewed the patients old records in the chart.   I have reviewed the patients old records in the chart.     Assessment & Plan    Diagnoses and all orders for this visit:    1. Oral phase dysphagia (Primary)  Comments:  Related to trismus and cancer therapy  Overview:  Added  automatically from request for surgery [id number]      2. Oropharyngeal dysphagia  Comments:  Related to cancer therapy  Overview:  Added automatically from request for surgery [id number]    Orders:  -     Ambulatory Referral to Speech Therapy    3. Squamous cell carcinoma of pharynx  Comments:  No evidence of recurrence  Overview:  Added automatically from request for surgery [id number]      4. Mucositis due to radiation therapy  Overview:  Added automatically from request for surgery [id number]      5. Moderate protein-calorie malnutrition  Comments:  Because of dysphagia       Continue current management plan.  Patient continues with trismus and oral as well as oropharyngeal dysphagia.  I will refer to speech pathology here.  He did not do well with at less.  I am not sure what his problem was exactly.  I will refer for speech pathology here and hopefully we can continue with electrical stimulation if they have that in their repertoire.  Patient has no evidence of disease.  He really does not require any further cancer surveillance.  I will continue to follow for dysphagia.  However, because of the extensive nature of his disease therapy, I feel he has prognosis for recovery.  No medications  Speech pathology referral  Call for problems    Return in about 6 months (around 4/25/2024) for Recheck Swallowing and trismus.      Electronically signed by Alli Card Jr, MD 10/25/23 12:10 PM CDT.

## 2024-01-08 ENCOUNTER — OFFICE VISIT (OUTPATIENT)
Dept: INTERNAL MEDICINE | Facility: CLINIC | Age: 74
End: 2024-01-08
Payer: MEDICARE

## 2024-01-08 VITALS
HEART RATE: 87 BPM | SYSTOLIC BLOOD PRESSURE: 118 MMHG | RESPIRATION RATE: 16 BRPM | DIASTOLIC BLOOD PRESSURE: 88 MMHG | HEIGHT: 73 IN | WEIGHT: 138.5 LBS | BODY MASS INDEX: 18.36 KG/M2 | OXYGEN SATURATION: 97 %

## 2024-01-08 DIAGNOSIS — G31.84 MILD COGNITIVE IMPAIRMENT: ICD-10-CM

## 2024-01-08 DIAGNOSIS — G47.00 INSOMNIA, UNSPECIFIED TYPE: ICD-10-CM

## 2024-01-08 DIAGNOSIS — F33.1 MODERATE EPISODE OF RECURRENT MAJOR DEPRESSIVE DISORDER: ICD-10-CM

## 2024-01-08 DIAGNOSIS — Z23 NEED FOR VACCINATION: ICD-10-CM

## 2024-01-08 DIAGNOSIS — I49.9 IRREGULAR HEART RHYTHM: ICD-10-CM

## 2024-01-08 DIAGNOSIS — I10 PRIMARY HYPERTENSION: ICD-10-CM

## 2024-01-08 DIAGNOSIS — C10.2 MALIGNANT NEOPLASM OF LATERAL WALL OF OROPHARYNX: ICD-10-CM

## 2024-01-08 DIAGNOSIS — I48.91 NEW ONSET ATRIAL FIBRILLATION: Primary | ICD-10-CM

## 2024-01-08 DIAGNOSIS — R00.2 PALPITATIONS: ICD-10-CM

## 2024-01-08 DIAGNOSIS — E44.0 MODERATE PROTEIN-CALORIE MALNUTRITION: ICD-10-CM

## 2024-01-08 DIAGNOSIS — E03.9 ACQUIRED HYPOTHYROIDISM: ICD-10-CM

## 2024-01-08 PROCEDURE — 1159F MED LIST DOCD IN RCRD: CPT | Performed by: INTERNAL MEDICINE

## 2024-01-08 PROCEDURE — 3079F DIAST BP 80-89 MM HG: CPT | Performed by: INTERNAL MEDICINE

## 2024-01-08 PROCEDURE — 3074F SYST BP LT 130 MM HG: CPT | Performed by: INTERNAL MEDICINE

## 2024-01-08 PROCEDURE — 99214 OFFICE O/P EST MOD 30 MIN: CPT | Performed by: INTERNAL MEDICINE

## 2024-01-08 PROCEDURE — 93000 ELECTROCARDIOGRAM COMPLETE: CPT | Performed by: INTERNAL MEDICINE

## 2024-01-08 PROCEDURE — 1160F RVW MEDS BY RX/DR IN RCRD: CPT | Performed by: INTERNAL MEDICINE

## 2024-01-08 PROCEDURE — 90662 IIV NO PRSV INCREASED AG IM: CPT | Performed by: INTERNAL MEDICINE

## 2024-01-08 PROCEDURE — G0008 ADMIN INFLUENZA VIRUS VAC: HCPCS | Performed by: INTERNAL MEDICINE

## 2024-01-08 RX ORDER — DONEPEZIL HYDROCHLORIDE 5 MG/1
5 TABLET, FILM COATED ORAL NIGHTLY
Qty: 90 TABLET | Refills: 1 | Status: SHIPPED | OUTPATIENT
Start: 2024-01-08

## 2024-01-08 RX ORDER — ZOLPIDEM TARTRATE 10 MG/1
10 TABLET ORAL NIGHTLY PRN
Qty: 30 TABLET | Refills: 3 | Status: CANCELLED | OUTPATIENT
Start: 2024-01-08

## 2024-01-08 RX ORDER — AMLODIPINE BESYLATE 5 MG/1
5 TABLET ORAL 2 TIMES WEEKLY
Qty: 180 TABLET | Refills: 1 | Status: SHIPPED | OUTPATIENT
Start: 2024-01-08

## 2024-01-08 NOTE — PROGRESS NOTES
CC: heart rhythm abnormal    History:  Fortino Dennis is a 73 y.o. male   He notes he has been having abnormal heart rhythm that he feels and his Apple watch has indicated atrial fibrillation. He has no dizziness, dyspnea nor syncope. He has gained weight since his last visit and is pleased with this, though his PEG fell out and he has been eating only. He has not been able to get Vstim therapy for SLP and was displeased with the administration at [facility]. He would like therapy, but declines this because he is not confident in what [facility] has to offer. He has not been taking his venlafaxine on a regular basis, but notes his anxiety has been running high.       ROS:  Review of Systems   Constitutional:  Negative for chills and fever.   Respiratory:  Negative for cough and shortness of breath.    Cardiovascular:  Positive for palpitations. Negative for chest pain.   Gastrointestinal:  Negative for abdominal pain and constipation.   Neurological:  Negative for dizziness and syncope.   Psychiatric/Behavioral:  The patient is nervous/anxious.         reports that he has never smoked. He has never been exposed to tobacco smoke. He has never used smokeless tobacco. He reports current alcohol use of about 3.0 standard drinks of alcohol per week. He reports that he does not use drugs.      Current Outpatient Medications:     Acetaminophen (TYLENOL ARTHRITIS PAIN PO), Take 0.5 tablets by mouth Daily As Needed., Disp: , Rfl:     amLODIPine (NORVASC) 5 MG tablet, Take 1 tablet by mouth 2 (Two) Times a Week., Disp: 180 tablet, Rfl: 1    atorvastatin (LIPITOR) 20 MG tablet, Take 1 tablet by mouth Daily., Disp: 90 tablet, Rfl: 3    donepezil (ARICEPT) 5 MG tablet, Take 1 tablet by mouth Every Night., Disp: 90 tablet, Rfl: 1    levothyroxine (SYNTHROID, LEVOTHROID) 50 MCG tablet, Take 1 tablet by mouth Daily., Disp: , Rfl:     SENNA PO, Take 1 capsule by mouth Daily., Disp: , Rfl:     venlafaxine XR (Effexor XR) 150 MG 24 hr  "capsule, Take 1 capsule by mouth Daily., Disp: 90 capsule, Rfl: 1    zolpidem (Ambien) 10 MG tablet, Take 1 tablet by mouth At Night As Needed for Sleep., Disp: 30 tablet, Rfl: 3    OBJECTIVE:  /88 (BP Location: Left arm, Patient Position: Sitting, Cuff Size: Adult)   Pulse 87   Resp 16   Ht 185.4 cm (73\")   Wt 62.8 kg (138 lb 8 oz)   SpO2 97%   BMI 18.27 kg/m²    Physical Exam  Constitutional:       General: He is not in acute distress.  Cardiovascular:      Rate and Rhythm: Normal rate. Rhythm irregularly irregular.      Heart sounds: Normal heart sounds. No murmur heard.  Pulmonary:      Effort: Pulmonary effort is normal.      Breath sounds: Normal breath sounds. No wheezing.   Neurological:      Mental Status: He is alert and oriented to person, place, and time.      Gait: Gait normal.   Psychiatric:         Mood and Affect: Mood normal.         Behavior: Behavior normal.         Assessment/Plan     Diagnoses and all orders for this visit:    1. New onset atrial fibrillation (Primary)  2. Palpitations  3. Irregular heart rhythm  -     Adult Transthoracic Echo Complete W/ Cont if Necessary Per Protocol; Future  -     rivaroxaban (XARELTO) 20 MG tablet; Take 1 tablet by mouth Daily.  Dispense: 90 tablet; Refill: 1  -     ECG 12 Lead  EKG shows atrial fibrillation, which is likely sustained given the smartwatch data he provides. Begin anticoagulation, though he is auto-rate controlled. Consider anti-arrhythmic and/or EP referral pending Echo results.     4. Insomnia, unspecified type  Fair control with zolpidem, which still has refills.     5. Need for vaccination  -     Fluzone High-Dose 65+yrs (2640-7170)    6. Primary hypertension  -     amLODIPine (NORVASC) 5 MG tablet; Take 1 tablet by mouth 2 (Two) Times a Week.  Dispense: 180 tablet; Refill: 1  Well controlled, BP goal for age is <140/90 per JNC 8 guidelines, and continue current medications    7. Mild cognitive impairment  -     donepezil " (ARICEPT) 5 MG tablet; Take 1 tablet by mouth Every Night.  Dispense: 90 tablet; Refill: 1  He felt it was working from an [place]of-state prescriber and would like to restart.     8. Acquired hypothyroidism  Check TSH per previous orders, especially in the setting of new-onset AF.     9. Moderate episode of recurrent major depressive disorder  Continue SNRI and encourage regular therapy.     10. 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 Moderate protein-calorie malnutrition  He has gained weight since his last visit and is encouraged in ongoing high protein, high calorie diet.       An After Visit Summary was printed and given to the patient at discharge.  Return in about 4 months (around 5/8/2024) for Recheck.      Fortino Sierra D.O. 1/8/2024   Electronically signed.

## 2024-01-08 NOTE — PROGRESS NOTES
Procedure     ECG 12 Lead    Date/Time: 1/8/2024 1:11 PM  Performed by: Fortino Sierra DO    Authorized by: Fortino Sierra DO  Comparison: compared with previous ECG from 6/2/2023  Comparison to previous ECG: New atrial fibrillation.  Rhythm: atrial fibrillation  Rate: normal  Conduction: conduction normal  ST Segments: ST segments normal  T Waves: T waves normal  QRS axis: normal  Other: no other findings    Clinical impression: abnormal EKG

## 2024-02-16 DIAGNOSIS — G47.00 INSOMNIA, UNSPECIFIED TYPE: ICD-10-CM

## 2024-02-16 RX ORDER — ZOLPIDEM TARTRATE 10 MG/1
10 TABLET ORAL NIGHTLY PRN
Qty: 30 TABLET | Refills: 1 | Status: SHIPPED | OUTPATIENT
Start: 2024-02-16

## 2024-03-04 NOTE — PROGRESS NOTES
MGW ONC [facility] HEMATOLOGY & ONCOLOGY  [address]  [place] [address] [phone]    Patient Name: Fortino Dennis  Encounter Date: 03/15/2024  YOB: 1950  Patient Number: [id number]      REASON FOR FOLLOW-UP: Fortino Dennis is a pleasant 73 y.o. 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  He had undergone left neck dissection after neoadjuvant chemo with radiation, 33 fractions in [facility]. .  Details are not available.  He is seen with his brother, Geovanny. History is obtained from patient.  History is considered reliable          DIAGNOSTIC ABNORMALITIES:   He presented with a left neck mass 05/2007 by Dr. Galicia.  He had undergone neoadjuvant chemo with radiation 33 fractions.   He had undergone left neck dissection.  Fine-needle aspiration 5/31/2007.  Pathology report 6/1/2007 from [facility] showed squamous cell carcinoma.  Colonoscopy by Dr. Imtiaz Summers 3/21/2014.  A few small mouth diverticula were found in the sigmoid colon.  The exam was otherwise without abnormality on direct and retroflexion views.  Pathology report 9/12/2017.  Gastric fundic mucosa and cardiac mucosa showed no dysplasia.  He had seen Dr. Fer Muhammad [facility] 9/26/2017 for gastroesophageal reflux disease, hiatal hernia and esophagitis.   Pathology report 10/20/2017 mediastinal mass excision.  Benign fibroadipose tissue.  8 small benign lymph nodes.  He had undergone endoscopy with dilatation and biopsy.  Findings showed normal duodenum.  Mild diffuse gastritis, biopsy taken from antrum and incisura.  Some apparent irregularity of the gastric cardia by the GE junction.  This was biopsied several times for pathology.  3 to 4 cm hiatal hernia.  GE junction at 43 cm.  Distal esophagitis class I or grade A.  He had undergone placement of LINX antireflux device on 10/19/2017 by   Fer Muhammad, [facility]. Patient referred to oncology.  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  He had undergone left segmental mandibulectomy.  Osteotomy and plate reconstruction of mandible. Local flap reconstruction, oral cavity approximately 12 cm² dissection in the background of prior oral cancer treatment with radiation necrosis of mandible on 4/19/2021.   Pathology report 4/26/2021.  Acute osteomyelitis with bone resorption and involvement of the posterior surgical margin.  He had undergone laryngoscopy, esophagoscopy, flexible bronchoscopy and dilation of esophageal stricture 11/17/2021.  Patient seen by JONATHAN Schmidt 2/14/2023 for hypertension.  He continued to have chewing difficulty and swallowing difficulty.  He had required feeding tube in 2001.  Patient referred to oncology          PREVIOUS INTERVENTIONS:  He had undergone neoadjuvant chemo with radiation 33 fractions followed by a left neck dissection at [facility] in 2007.         Oncology/Hematology History   Squamous cell carcinoma of pharynx   6/15/2023 Initial Diagnosis    Squamous cell carcinoma of pharynx     10/25/2023 - 10/25/2023 Other Event    ENT surveillance  Patient with no evidence of recurrent disease.  He has severe trismus  The left mandibular free flap appears intact  Neck has banding from prior multiple surgeries and radiation  Patient appears to be cancer free at this time.  He wishes continued surveillance.  Alli Card Jr, MD  10/25/2023  12:59 CDT           PAST MEDICAL HISTORY:  ALLERGIES:  No Known Allergies  CURRENT MEDICATIONS:  Outpatient Encounter Medications as of 3/15/2024   Medication Sig Dispense Refill    Acetaminophen (TYLENOL ARTHRITIS PAIN PO) Take 0.5 tablets by mouth Daily As Needed.      amLODIPine (NORVASC) 5 MG tablet Take 1 tablet by mouth 2 (Two) Times a Week. 180 tablet 1    atorvastatin (LIPITOR) 20 MG tablet Take 1 tablet by mouth Daily. 90  tablet 3    donepezil (ARICEPT) 5 MG tablet Take 1 tablet by mouth Every Night. 90 tablet 1    levothyroxine (SYNTHROID, LEVOTHROID) 50 MCG tablet Take 1 tablet by mouth Daily.      rivaroxaban (XARELTO) 20 MG tablet Take 1 tablet by mouth Daily. 90 tablet 1    SENNA PO Take 1 capsule by mouth Daily.      venlafaxine XR (Effexor XR) 150 MG 24 hr capsule Take 1 capsule by mouth Daily. 90 capsule 1    zolpidem (AMBIEN) 10 MG tablet Take 1 tablet by mouth At Night As Needed for Sleep. 30 tablet 1     No facility-administered encounter medications on file as of 3/15/2024.     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    Mucositis due to radiation therapy K12.33     SURGERIES:  Past Surgical History:   Procedure Laterality Date    CATARACT EXTRACTION WITH INTRAOCULAR LENS IMPLANT Bilateral     pt states x3 surgeries    LARYNGOSCOPY Bilateral 7/7/2023    Procedure: MICRODIRECT LARYNGOSCOPY;  Surgeon: Alli Card Jr., MD;  Location: [facility] OR;  Service: ENT;  Laterality: Bilateral;    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    MANDIBLE SURGERY  2021    removal of necrotic jaw (part of 1 of 2 surgeries)    MASTECTOMY Left     removal of breast due to benign growth    PANENDOSCOPY Bilateral 7/7/2023    Procedure: DIRECT LARYNGOSCOPY, ESOPHAGOSCOPY, BRONCHOSCOPY, MICRODIRECT LARYNGOSCOPY,OR ESOPHAGEAL DILATATION 38-48;  Surgeon: Alli Card Jr., MD;  Location: [facility] OR;  Service: ENT;  Laterality: Bilateral;    THROAT SURGERY  2007     "cancerous mass removed     HEALTH MAINTENANCE ITEMS:  Health Maintenance Due   Topic Date Due    TDAP/TD VACCINES (1 - Tdap) Never done    ZOSTER VACCINE (1 of 2) Never done    RSV Vaccine - Adults (1 - 1-dose 60+ series) Never done    COVID-19 Vaccine (4 - 2023-24 season) 09/01/2023    LIPID PANEL  12/19/2023       <no information>  Last Completed Colonoscopy            COLORECTAL CANCER SCREENING (COLONOSCOPY - Every 10 Years) Next due on 9/17/2031 09/17/2021  Outside Procedure: MO COLORECTAL SCRN; HI RISK IND    03/21/2014  SCANNED - COLONOSCOPY                  Immunization History   Administered Date(s) Administered    COVID-19 (PFIZER) BIVALENT 12+YRS 10/11/2022    COVID-19 (PFIZER) Purple Cap Monovalent 11/21/2021, 06/14/2022    Fluzone High-Dose 65+yrs 01/08/2024    Influenza, Unspecified 10/11/2022    Pneumococcal Conjugate 20-Valent (PCV20) 06/02/2023     Last Completed Mammogram       This patient has no relevant Health Maintenance data.              FAMILY HISTORY:  Family History   Problem Relation Age of Onset    Cancer Mother     Hypertension Mother     Dementia Mother     Colon cancer Mother     Colon polyps Neg Hx      SOCIAL HISTORY:  Social History     Socioeconomic History    Marital status: Single   Tobacco Use    Smoking status: Never     Passive exposure: Never    Smokeless tobacco: Never    Tobacco comments:     Zero -- none   Vaping Use    Vaping status: Never Used   Substance and Sexual Activity    Alcohol use: Yes     Alcohol/week: 3.0 standard drinks of alcohol     Types: 3 Cans of beer per week     Comment: daily beers    Drug use: Never    Sexual activity: Not Currently     Partners: Female       REVIEW OF SYSTEMS:    Review of Systems   Constitutional:  Positive for fatigue. Negative for fever and unexpected weight change.        \"I feel about 50%.\"   HENT:  Negative for congestion and mouth sores.    Eyes:  Negative for discharge and redness.   Respiratory:  Negative for " "shortness of breath and wheezing.    Cardiovascular:  Negative for chest pain and palpitations.   Gastrointestinal:  Negative for blood in stool, nausea and vomiting.   Endocrine: Negative for cold intolerance and heat intolerance.   Genitourinary:  Negative for difficulty urinating and dysuria.   Musculoskeletal:  Negative for myalgias.   Skin:  Positive for pallor.   Allergic/Immunologic: Negative for food allergies.   Neurological:  Negative for dizziness and speech difficulty.   Hematological:  Negative for adenopathy. Does not bruise/bleed easily.   Psychiatric/Behavioral:  Negative for agitation, confusion and hallucinations.        VITAL SIGNS: /78   Pulse 92   Temp 97.3 °F (36.3 °C)   Resp 16   Ht 185.4 cm (73\")   Wt 66.2 kg (146 lb)   SpO2 100%   BMI 19.26 kg/m²  Gained 5 pounds.   Pain Score    03/15/24 1125   PainSc: 0-No pain       PHYSICAL EXAMINATION:     Physical Exam  Vitals reviewed.   Constitutional:       General: He is not in acute distress.     Comments: He arrived in the exam room with a cane. \"Just in case.\"   HENT:      Head: Normocephalic and atraumatic.   Eyes:      General: No scleral icterus.  Cardiovascular:      Rate and Rhythm: Normal rate.   Pulmonary:      Effort: No respiratory distress.      Breath sounds: No wheezing.   Abdominal:      General: Bowel sounds are normal.      Palpations: Abdomen is soft.      Tenderness: There is no abdominal tenderness.      Comments: PEG removed. \"It came out.\"   Musculoskeletal:         General: No swelling.      Cervical back: Neck supple.   Skin:     General: Skin is warm.      Coloration: Skin is pale.   Neurological:      Mental Status: He is alert and oriented to person, place, and time.   Psychiatric:         Mood and Affect: Mood normal.         Behavior: Behavior normal.         Thought Content: Thought content normal.         Judgment: Judgment normal.         LABS    Lab Results - Last 18 Months   Lab Units 03/15/24  1046 " "07/06/23  1203 06/02/23  1156 03/15/23  1123 02/07/23  1137   HEMOGLOBIN g/dL 12.9* 13.7 14.0 13.5 13.1   HEMATOCRIT % 40.1 41.3 43.3 41.4 37.5   MCV fL 100.5* 99.8* 101.9* 101.0* 97.9*   WBC 10*3/mm3 5.31 4.01 4.75 3.91 3.66   RDW % 12.8 12.3 12.5 12.9 12.9   MPV fL 8.4 8.2 8.1 8.0 8.4   PLATELETS 10*3/mm3 212 171 192 180 215   IMM GRAN % % 0.4  --   --  0.3  --    NEUTROS ABS 10*3/mm3 3.75  --   --  2.70  --    LYMPHS ABS 10*3/mm3 0.76  --   --  0.46*  --    MONOS ABS 10*3/mm3 0.65  --   --  0.66  --    EOS ABS 10*3/mm3 0.11  --   --  0.06  --    BASOS ABS 10*3/mm3 0.02  --   --  0.02  --    IMMATURE GRANS (ABS) 10*3/mm3 0.02  --   --  0.01  --    NRBC /100 WBC 0.0  --   --  0.0  --        Lab Results - Last 18 Months   Lab Units 03/15/24  1046 07/06/23  1203 06/02/23  1156 03/15/23  1123 02/07/23  1137   GLUCOSE mg/dL 120* 79 91 91 92   SODIUM mmol/L 142 132* 132* 134* 131*   POTASSIUM mmol/L 4.0 4.1 3.9 4.1 4.4   CO2 mmol/L 30.0* 28.0 27.0 29.0 31.0*   CHLORIDE mmol/L 104 95* 94* 96* 95*   ANION GAP mmol/L 8.0 9.0 11.0 9.0 5.0   CREATININE mg/dL 0.45* 0.45* 0.42* 0.46* 0.48*   BUN mg/dL 14 9 9 11 10   BUN / CREAT RATIO  31.1* 20.0 21.4 23.9 20.8   CALCIUM mg/dL 8.8 9.4 9.5 9.6 9.4   ALK PHOS U/L 88  --   --  74 58   TOTAL PROTEIN g/dL 6.7  --   --  6.8 6.3   ALT (SGPT) U/L 14  --   --  24 22   AST (SGOT) U/L 18  --   --  21 23   BILIRUBIN mg/dL 0.5  --   --  0.6 0.4   ALBUMIN g/dL 4.0  --   --  4.5 4.4   GLOBULIN gm/dL 2.7  --   --  2.3 1.9       No results for input(s): \"MSPIKE\", \"KAPPALAMB\", \"IGLFLC\", \"URICACID\", \"FREEKAPPAL\", \"CEA\", \"LDH\", \"REFLABREPO\" in the last 00183 hours.    Lab Results - Last 18 Months   Lab Units 06/02/23  1156 03/15/23  1123 02/07/23  1137   TSH uIU/mL 4.030 4.420* 3.270       Fortino Dennis reports a pain score of 0.          ASSESSMENT:  1.  Squamous cell carcinoma, base of the tongue.  Diagnosed in 2007.  AJCC stage: (T1, N0, M0).  Treatment status: Post neck dissection and " neoadjuvant chemoradiation.  2.  Performance status of 1.  3.  Macrocytic anemia Await B12, folate, and iron profile.    4.  Dysphagia.  Tube feeding 2001 through Spring 2023.  5.  Atrial fibrillation on Xarelto.   6.  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         PLAN:  1.   Re: Reason for the follow-up.  CT neck and chest on 3/21/2023. No definite neoplastic disease is seen. Chronic lung changes.  No evidence of neoplastic disease within the chest.  2.   Re: Role for surveillance for carcinoma base of tongue diagnosed in 2007.  He had completed neoadjuvant chemo with radiation, 33 fractions in 2007.  PET 4/7/2023.  Hypermetabolic activity within the RIGHT base of tongue, RIGHT floor of mouth, and RIGHT retromolar trigone without discrete measurable mass. This may be physiologic muscular uptake or inflammatory in nature but  recommend correlation with direct visualization to exclude neoplasm at this site.   No other abnormal hypermetabolic activity. No evidence of metastatic disease in this patient with history of LEFT sided head neck cancer. Dilatation ascending aorta measuring 4.1 cm diameter  3.   Re: Heme status.  WBC 3.9, hemoglobin 13.5, hematocrit 41.5,  and platelet 180 on 3/15/2023.   Hemoglobin 12.9 and .5.   4.   Re: CMP.  .8 from 109.8 mm/min on 3/15/2023.  5.   Re: TSH at 3.83 from 4.42.  6.   Re: Note from Dr. Card on 10/25/2023. Oropharyngeal dysphagia. No evidence of disease.   7.  Keep appointment with ENT for surveillance.  8  Keep appointment with JONATHAN Reynolds from GI.  9.  Continue care per primary care physician at the specialist  10.  Plan of care discussed with patient and brother.  Understand expressed.  The patient is agreeable to proceed  11. Advance Care Planning   ACP discussion was held with the patient during this visit. Patient has an advance directive in EMR which is still valid.    12.   Added retic, B12, folate, ferritin, and iron panel for anemia.    13.  He will be seen every 12 months.  14.  Sable for observation, cancer.   15.  Return to office in 12 months with CBC with differential, CMP and TSH.          I have reviewed the assessment and plan and verified the accuracy of it. No changes to assessment and plan since the information was documented. Chandler Gallardo MD 03/15/24         I spent 41 total minutes, face-to-face, caring for Fortino arenas. Greater than 50% of this time involved counseling and/or coordination of care as documented within this note.         (Alli Card MD)   (Channing Mcmanus MD)  (JONATHAN Reynolds)  (JONATHAN Schmidt)

## 2024-03-14 DIAGNOSIS — I10 PRIMARY HYPERTENSION: Primary | ICD-10-CM

## 2024-03-14 DIAGNOSIS — C14.0 SQUAMOUS CELL CARCINOMA OF PHARYNX: ICD-10-CM

## 2024-03-14 DIAGNOSIS — C10.2 MALIGNANT NEOPLASM OF LATERAL WALL OF OROPHARYNX: ICD-10-CM

## 2024-03-15 ENCOUNTER — OFFICE VISIT (OUTPATIENT)
Dept: ONCOLOGY | Facility: CLINIC | Age: 74
End: 2024-03-15
Payer: MEDICARE

## 2024-03-15 ENCOUNTER — LAB (OUTPATIENT)
Dept: LAB | Facility: HOSPITAL | Age: 74
End: 2024-03-15
Payer: MEDICARE

## 2024-03-15 VITALS
HEART RATE: 92 BPM | WEIGHT: 143.4 LBS | HEIGHT: 73 IN | SYSTOLIC BLOOD PRESSURE: 118 MMHG | BODY MASS INDEX: 19.01 KG/M2 | TEMPERATURE: 97.3 F | RESPIRATION RATE: 16 BRPM | OXYGEN SATURATION: 100 % | DIASTOLIC BLOOD PRESSURE: 78 MMHG

## 2024-03-15 DIAGNOSIS — T45.1X5A ANEMIA DUE TO ANTINEOPLASTIC CHEMOTHERAPY: ICD-10-CM

## 2024-03-15 DIAGNOSIS — C10.2 MALIGNANT NEOPLASM OF LATERAL WALL OF OROPHARYNX: ICD-10-CM

## 2024-03-15 DIAGNOSIS — R53.0 NEOPLASTIC MALIGNANT RELATED FATIGUE: ICD-10-CM

## 2024-03-15 DIAGNOSIS — R53.83 OTHER FATIGUE: ICD-10-CM

## 2024-03-15 DIAGNOSIS — D64.81 ANEMIA DUE TO ANTINEOPLASTIC CHEMOTHERAPY: ICD-10-CM

## 2024-03-15 DIAGNOSIS — C14.0 SQUAMOUS CELL CARCINOMA OF PHARYNX: Primary | ICD-10-CM

## 2024-03-15 DIAGNOSIS — I10 PRIMARY HYPERTENSION: ICD-10-CM

## 2024-03-15 DIAGNOSIS — G47.00 INSOMNIA, UNSPECIFIED TYPE: ICD-10-CM

## 2024-03-15 DIAGNOSIS — Z51.81 ENCOUNTER FOR THERAPEUTIC DRUG MONITORING: ICD-10-CM

## 2024-03-15 DIAGNOSIS — C10.2 MALIGNANT NEOPLASM OF LATERAL WALL OF OROPHARYNX: Primary | ICD-10-CM

## 2024-03-15 LAB
ALBUMIN SERPL-MCNC: 4 G/DL (ref 3.5–5.2)
ALBUMIN/GLOB SERPL: 1.5 G/DL
ALP SERPL-CCNC: 88 U/L (ref 39–117)
ALT SERPL W P-5'-P-CCNC: 14 U/L (ref 1–41)
ANION GAP SERPL CALCULATED.3IONS-SCNC: 8 MMOL/L (ref 5–15)
AST SERPL-CCNC: 18 U/L (ref 1–40)
BASOPHILS # BLD AUTO: 0.02 10*3/MM3 (ref 0–0.2)
BASOPHILS NFR BLD AUTO: 0.4 % (ref 0–1.5)
BILIRUB SERPL-MCNC: 0.5 MG/DL (ref 0–1.2)
BILIRUB UR QL STRIP: NEGATIVE
BUN SERPL-MCNC: 14 MG/DL (ref 8–23)
BUN/CREAT SERPL: 31.1 (ref 7–25)
CALCIUM SPEC-SCNC: 8.8 MG/DL (ref 8.6–10.5)
CHLORIDE SERPL-SCNC: 104 MMOL/L (ref 98–107)
CLARITY UR: CLEAR
CO2 SERPL-SCNC: 30 MMOL/L (ref 22–29)
COLOR UR: YELLOW
CREAT SERPL-MCNC: 0.45 MG/DL (ref 0.76–1.27)
DEPRECATED RDW RBC AUTO: 47.4 FL (ref 37–54)
EGFRCR SERPLBLD CKD-EPI 2021: 109.8 ML/MIN/1.73
EOSINOPHIL # BLD AUTO: 0.11 10*3/MM3 (ref 0–0.4)
EOSINOPHIL NFR BLD AUTO: 2.1 % (ref 0.3–6.2)
ERYTHROCYTE [DISTWIDTH] IN BLOOD BY AUTOMATED COUNT: 12.8 % (ref 12.3–15.4)
FERRITIN SERPL-MCNC: 101.1 NG/ML (ref 30–400)
FOLATE SERPL-MCNC: >20 NG/ML (ref 4.78–24.2)
GLOBULIN UR ELPH-MCNC: 2.7 GM/DL
GLUCOSE SERPL-MCNC: 120 MG/DL (ref 65–99)
GLUCOSE UR STRIP-MCNC: NEGATIVE MG/DL
HCT VFR BLD AUTO: 40.1 % (ref 37.5–51)
HGB BLD-MCNC: 12.9 G/DL (ref 13–17.7)
HGB UR QL STRIP.AUTO: NEGATIVE
HOLD SPECIMEN: NORMAL
IMM GRANULOCYTES # BLD AUTO: 0.02 10*3/MM3 (ref 0–0.05)
IMM GRANULOCYTES NFR BLD AUTO: 0.4 % (ref 0–0.5)
IRON 24H UR-MRATE: 90 MCG/DL (ref 59–158)
IRON SATN MFR SERPL: 24 % (ref 20–50)
KETONES UR QL STRIP: NEGATIVE
LEUKOCYTE ESTERASE UR QL STRIP.AUTO: NEGATIVE
LYMPHOCYTES # BLD AUTO: 0.76 10*3/MM3 (ref 0.7–3.1)
LYMPHOCYTES NFR BLD AUTO: 14.3 % (ref 19.6–45.3)
MCH RBC QN AUTO: 32.3 PG (ref 26.6–33)
MCHC RBC AUTO-ENTMCNC: 32.2 G/DL (ref 31.5–35.7)
MCV RBC AUTO: 100.5 FL (ref 79–97)
MONOCYTES # BLD AUTO: 0.65 10*3/MM3 (ref 0.1–0.9)
MONOCYTES NFR BLD AUTO: 12.2 % (ref 5–12)
NEUTROPHILS NFR BLD AUTO: 3.75 10*3/MM3 (ref 1.7–7)
NEUTROPHILS NFR BLD AUTO: 70.6 % (ref 42.7–76)
NITRITE UR QL STRIP: NEGATIVE
NRBC BLD AUTO-RTO: 0 /100 WBC (ref 0–0.2)
PH UR STRIP.AUTO: 6.5 [PH] (ref 5–8)
PLATELET # BLD AUTO: 212 10*3/MM3 (ref 140–450)
PMV BLD AUTO: 8.4 FL (ref 6–12)
POTASSIUM SERPL-SCNC: 4 MMOL/L (ref 3.5–5.2)
PROT SERPL-MCNC: 6.7 G/DL (ref 6–8.5)
PROT UR QL STRIP: NEGATIVE
RBC # BLD AUTO: 3.99 10*6/MM3 (ref 4.14–5.8)
RETICS # AUTO: 0.05 10*6/MM3 (ref 0.02–0.13)
RETICS/RBC NFR AUTO: 1.23 % (ref 0.7–1.9)
SODIUM SERPL-SCNC: 142 MMOL/L (ref 136–145)
SP GR UR STRIP: 1.01 (ref 1–1.03)
TIBC SERPL-MCNC: 374 MCG/DL (ref 298–536)
TRANSFERRIN SERPL-MCNC: 251 MG/DL (ref 200–360)
TSH SERPL DL<=0.05 MIU/L-ACNC: 3.83 UIU/ML (ref 0.27–4.2)
UROBILINOGEN UR QL STRIP: NORMAL
VIT B12 BLD-MCNC: 999 PG/ML (ref 211–946)
WBC NRBC COR # BLD AUTO: 5.31 10*3/MM3 (ref 3.4–10.8)

## 2024-03-15 PROCEDURE — 84443 ASSAY THYROID STIM HORMONE: CPT

## 2024-03-15 PROCEDURE — 80307 DRUG TEST PRSMV CHEM ANLYZR: CPT

## 2024-03-15 PROCEDURE — 82728 ASSAY OF FERRITIN: CPT

## 2024-03-15 PROCEDURE — 82607 VITAMIN B-12: CPT

## 2024-03-15 PROCEDURE — 82746 ASSAY OF FOLIC ACID SERUM: CPT

## 2024-03-15 PROCEDURE — 81003 URINALYSIS AUTO W/O SCOPE: CPT

## 2024-03-15 PROCEDURE — 85045 AUTOMATED RETICULOCYTE COUNT: CPT

## 2024-03-15 PROCEDURE — 85025 COMPLETE CBC W/AUTO DIFF WBC: CPT

## 2024-03-15 PROCEDURE — 80053 COMPREHEN METABOLIC PANEL: CPT

## 2024-03-15 PROCEDURE — 83540 ASSAY OF IRON: CPT

## 2024-03-15 PROCEDURE — 36415 COLL VENOUS BLD VENIPUNCTURE: CPT

## 2024-03-15 PROCEDURE — G0483 DRUG TEST DEF 22+ CLASSES: HCPCS

## 2024-03-15 PROCEDURE — 84466 ASSAY OF TRANSFERRIN: CPT

## 2024-03-21 LAB — DRUGS UR: NORMAL

## 2024-03-29 ENCOUNTER — HOSPITAL ENCOUNTER (OUTPATIENT)
Dept: CARDIOLOGY | Facility: HOSPITAL | Age: 74
Discharge: HOME | End: 2024-03-29
Payer: MEDICARE

## 2024-03-29 VITALS
BODY MASS INDEX: 19.22 KG/M2 | SYSTOLIC BLOOD PRESSURE: 118 MMHG | WEIGHT: 145 LBS | DIASTOLIC BLOOD PRESSURE: 78 MMHG | HEIGHT: 73 IN

## 2024-03-29 DIAGNOSIS — R00.2 PALPITATIONS: ICD-10-CM

## 2024-03-29 DIAGNOSIS — I49.9 IRREGULAR HEART RHYTHM: ICD-10-CM

## 2024-03-29 DIAGNOSIS — I48.91 NEW ONSET ATRIAL FIBRILLATION: ICD-10-CM

## 2024-03-29 PROBLEM — I34.0 MILD MITRAL REGURGITATION: Status: ACTIVE | Noted: 2024-03-29

## 2024-03-29 PROBLEM — I35.0 MILD AORTIC STENOSIS: Status: ACTIVE | Noted: 2024-03-29

## 2024-03-29 LAB
BH CV ECHO MEAS - AO MAX PG: 11.4 MMHG
BH CV ECHO MEAS - AO MEAN PG: 5.2 MMHG
BH CV ECHO MEAS - AO ROOT DIAM: 3.3 CM
BH CV ECHO MEAS - AO V2 MAX: 169 CM/SEC
BH CV ECHO MEAS - AO V2 VTI: 30.7 CM
BH CV ECHO MEAS - AVA(I,D): 1.45 CM2
BH CV ECHO MEAS - EDV(CUBED): 77.9 ML
BH CV ECHO MEAS - EDV(MOD-SP2): 82.6 ML
BH CV ECHO MEAS - EDV(MOD-SP4): 67.1 ML
BH CV ECHO MEAS - EF(MOD-BP): 61 %
BH CV ECHO MEAS - EF(MOD-SP2): 59.8 %
BH CV ECHO MEAS - EF(MOD-SP4): 63.6 %
BH CV ECHO MEAS - ESV(CUBED): 15.8 ML
BH CV ECHO MEAS - ESV(MOD-SP2): 33.2 ML
BH CV ECHO MEAS - ESV(MOD-SP4): 24.4 ML
BH CV ECHO MEAS - FS: 41.2 %
BH CV ECHO MEAS - IVS/LVPW: 1.25 CM
BH CV ECHO MEAS - IVSD: 1.08 CM
BH CV ECHO MEAS - LA DIMENSION: 3.4 CM
BH CV ECHO MEAS - LAT PEAK E' VEL: 13.7 CM/SEC
BH CV ECHO MEAS - LV DIASTOLIC VOL/BSA (35-75): 36 CM2
BH CV ECHO MEAS - LV MASS(C)D: 135.4 GRAMS
BH CV ECHO MEAS - LV MAX PG: 3.3 MMHG
BH CV ECHO MEAS - LV MEAN PG: 2 MMHG
BH CV ECHO MEAS - LV SYSTOLIC VOL/BSA (12-30): 13.1 CM2
BH CV ECHO MEAS - LV V1 MAX: 90.6 CM/SEC
BH CV ECHO MEAS - LV V1 VTI: 17.5 CM
BH CV ECHO MEAS - LVIDD: 4.3 CM
BH CV ECHO MEAS - LVIDS: 2.5 CM
BH CV ECHO MEAS - LVOT AREA: 2.5 CM2
BH CV ECHO MEAS - LVOT DIAM: 1.8 CM
BH CV ECHO MEAS - LVPWD: 0.86 CM
BH CV ECHO MEAS - MED PEAK E' VEL: 12.2 CM/SEC
BH CV ECHO MEAS - MR MAX PG: 100.8 MMHG
BH CV ECHO MEAS - MR MAX VEL: 502 CM/SEC
BH CV ECHO MEAS - MR MEAN PG: 70 MMHG
BH CV ECHO MEAS - MR MEAN VEL: 394 CM/SEC
BH CV ECHO MEAS - MR VTI: 167 CM
BH CV ECHO MEAS - MV DEC TIME: 0.17 SEC
BH CV ECHO MEAS - MV E MAX VEL: 95 CM/SEC
BH CV ECHO MEAS - RAP SYSTOLE: 5 MMHG
BH CV ECHO MEAS - RVSP: 30.2 MMHG
BH CV ECHO MEAS - SI(MOD-SP2): 26.5 ML/M2
BH CV ECHO MEAS - SI(MOD-SP4): 22.9 ML/M2
BH CV ECHO MEAS - SV(LVOT): 44.5 ML
BH CV ECHO MEAS - SV(MOD-SP2): 49.4 ML
BH CV ECHO MEAS - SV(MOD-SP4): 42.7 ML
BH CV ECHO MEAS - TR MAX PG: 25.2 MMHG
BH CV ECHO MEAS - TR MAX VEL: 251 CM/SEC
BH CV ECHO MEASUREMENTS AVERAGE E/E' RATIO: 7.34
BH CV XLRA - RV BASE: 4.2 CM
BH CV XLRA - RV LENGTH: 5.6 CM
BH CV XLRA - RV MID: 3.5 CM
LEFT ATRIUM VOLUME INDEX: 40.1 ML/M2
LEFT ATRIUM VOLUME: 75 ML

## 2024-03-29 PROCEDURE — 93306 TTE W/DOPPLER COMPLETE: CPT

## 2024-04-14 DIAGNOSIS — G47.00 INSOMNIA, UNSPECIFIED TYPE: ICD-10-CM

## 2024-04-15 ENCOUNTER — TELEPHONE (OUTPATIENT)
Dept: INTERNAL MEDICINE | Facility: CLINIC | Age: 74
End: 2024-04-15

## 2024-04-15 DIAGNOSIS — G47.00 INSOMNIA, UNSPECIFIED TYPE: ICD-10-CM

## 2024-04-15 RX ORDER — ZOLPIDEM TARTRATE 10 MG/1
10 TABLET ORAL NIGHTLY PRN
Qty: 30 TABLET | Refills: 1 | Status: CANCELLED | OUTPATIENT
Start: 2024-04-15

## 2024-04-15 RX ORDER — ZOLPIDEM TARTRATE 10 MG/1
10 TABLET ORAL NIGHTLY PRN
Qty: 30 TABLET | Refills: 1 | Status: SHIPPED | OUTPATIENT
Start: 2024-04-15

## 2024-04-15 RX ORDER — ZOLPIDEM TARTRATE 10 MG/1
10 TABLET ORAL NIGHTLY PRN
Qty: 30 TABLET | Refills: 1 | OUTPATIENT
Start: 2024-04-15

## 2024-04-15 NOTE — TELEPHONE ENCOUNTER
Caller: Fortino Dennis    Relationship: Self    Best call back number: [phone]     Requested Prescriptions:   Requested Prescriptions     Pending Prescriptions Disp Refills    zolpidem (AMBIEN) 10 MG tablet 30 tablet 1     Sig: Take 1 tablet by mouth At Night As Needed for Sleep.        Pharmacy where request should be sent: [id number] [place] [facility] [address] [facility] [phone] [facility] [phone]      Last office visit with prescribing clinician: Visit date not found   Last telemedicine visit with prescribing clinician: Visit date not found   Next office visit with prescribing clinician: 5/8/2024     Additional details provided by patient: COMPLETELY OUT     Does the patient have less than a 3 day supply:  [x] Yes  [] No    Would you like a call back once the refill request has been completed: [x] Yes [] No    If the office needs to give you a call back, can they leave a voicemail: [x] Yes [] No    Mark Simmons III, RegSched Rep   04/15/24 09:14 CDT           
likely infectious vs ischemic  liquid diet  monitor cbc   transfuse prn  cipro /flagyl  stool studies

## 2024-04-29 ENCOUNTER — OFFICE VISIT (OUTPATIENT)
Dept: OTOLARYNGOLOGY | Facility: CLINIC | Age: 74
End: 2024-04-29
Payer: MEDICARE

## 2024-04-29 VITALS
HEIGHT: 73 IN | HEART RATE: 65 BPM | DIASTOLIC BLOOD PRESSURE: 67 MMHG | WEIGHT: 138 LBS | RESPIRATION RATE: 20 BRPM | TEMPERATURE: 97.5 F | BODY MASS INDEX: 18.29 KG/M2 | SYSTOLIC BLOOD PRESSURE: 104 MMHG

## 2024-04-29 DIAGNOSIS — K22.2 STENOSIS OF ESOPHAGUS: ICD-10-CM

## 2024-04-29 DIAGNOSIS — R13.11 ORAL PHASE DYSPHAGIA: Primary | ICD-10-CM

## 2024-04-29 DIAGNOSIS — K12.33 MUCOSITIS DUE TO RADIATION THERAPY: ICD-10-CM

## 2024-04-29 DIAGNOSIS — R25.2 TRISMUS: ICD-10-CM

## 2024-04-29 DIAGNOSIS — Z85.819 HISTORY OF PHARYNGEAL CANCER: ICD-10-CM

## 2024-04-29 DIAGNOSIS — E03.9 ACQUIRED HYPOTHYROIDISM: ICD-10-CM

## 2024-04-29 DIAGNOSIS — R13.12 OROPHARYNGEAL DYSPHAGIA: ICD-10-CM

## 2024-04-29 DIAGNOSIS — E44.0 MODERATE PROTEIN-CALORIE MALNUTRITION: ICD-10-CM

## 2024-04-29 PROCEDURE — 99213 OFFICE O/P EST LOW 20 MIN: CPT | Performed by: OTOLARYNGOLOGY

## 2024-04-29 PROCEDURE — 3078F DIAST BP <80 MM HG: CPT | Performed by: OTOLARYNGOLOGY

## 2024-04-29 PROCEDURE — 31575 DIAGNOSTIC LARYNGOSCOPY: CPT | Performed by: OTOLARYNGOLOGY

## 2024-04-29 PROCEDURE — 1159F MED LIST DOCD IN RCRD: CPT | Performed by: OTOLARYNGOLOGY

## 2024-04-29 PROCEDURE — 3074F SYST BP LT 130 MM HG: CPT | Performed by: OTOLARYNGOLOGY

## 2024-04-29 PROCEDURE — 1160F RVW MEDS BY RX/DR IN RCRD: CPT | Performed by: OTOLARYNGOLOGY

## 2024-04-29 NOTE — PATIENT INSTRUCTIONS
Neck stretching  Stretch mouth open    Speech therapy for swallowing.    Call for problems  Consider feeding tube placement     CONTACT INFORMATION:  The main office phone number is [phone]. For emergencies after hours and on weekends, this number will convert over to our answering service and the on call provider will answer. Please try to keep non emergent phone calls/ questions to office hours 9am-5pm Monday through Friday.     [employer]  As an alternative, you can sign up and use the Epic MyChart system for more direct and quicker access for non emergent questions/ problems.  [facility] [employer] allows you to send messages to your doctor, view your test results, renew your prescriptions, schedule appointments, and more. To sign up, go to [employer] and click on the Sign Up Now link in the New User? box. Enter your [employer] Activation Code exactly as it appears below along with the last four digits of your Social Security Number and your Date of Birth () to complete the sign-up process. If you do not sign up before the expiration date, you must request a new code.    [employer] Activation Code: Activation code not generated  Current [employer] Status: Active    If you have questions, you can email [email or web address] or call [phone] to talk to our [employer] staff. Remember, [employer] is NOT to be used for urgent needs. For medical emergencies, dial 911.

## 2024-04-29 NOTE — PROGRESS NOTES
Alli Card Jr, MD  [facility] ENT [facility] EAR NOSE & THROAT  [address], [address]  [facility] [address]  Fax [phone]  Phone [phone]      Visit Type: FOLLOW UP   Chief Complaint   Patient presents with    Cancer Surveillance           HPI  Accompanied by:No one  Fortino Dennis is a 73 y.o. male who presents for routine cancer follow up.  Patient continues with oropharyngeal and oral dysphagia related to tightening.  He says he is worsening.  He went to [place] for swallowing therapy.  He says he has had no improvement following this.  He states that in [place], he was on therapy all the time.  Swallowing, minimal. NP regurg with swallowing water rapidly.  If he takes this time, he does not have nasopharyngeal regurgitation.    Oncology History:  Oncology/Hematology History   Squamous cell carcinoma of pharynx   6/15/2023 Initial Diagnosis    Squamous cell carcinoma of pharynx     10/25/2023 - 10/25/2023 Other Event    ENT surveillance  Patient with no evidence of recurrent disease.  He has severe trismus  The left mandibular free flap appears intact  Neck has banding from prior multiple surgeries and radiation  Patient appears to be cancer free at this time.  He wishes continued surveillance.  Alli Card Jr, MD  10/25/2023  12:59 CDT       4/29/2024 - 4/29/2024 Other Event    ENT oncologic surveillance:  Oncologic surveillance carried out today.  No evidence of recurrence.  Likely given that the patient is greater than 5 years out.  Oral and oropharyngeal dysphagia worsening slowly.  I feel this is due to brawny induration.    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    Sleep apnea     Throat cancer     Thyroid disorder     Tinnitus 2020    TMJ dysfunction 2020 wear        Past Surgical History:   Procedure Laterality Date    CATARACT EXTRACTION WITH INTRAOCULAR LENS IMPLANT Bilateral     pt states x3 surgeries    LARYNGOSCOPY Bilateral 7/7/2023    Procedure: MICRODIRECT LARYNGOSCOPY;  Surgeon: Alli Card Jr., MD;  Location:  [facility] OR;  Service: ENT;  Laterality: Bilateral;    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     Social History: He  reports that he has never smoked. He has never been exposed to tobacco smoke. He has never used smokeless tobacco. He reports current alcohol use of about 3.0 standard drinks of alcohol per week. He reports that he does not use drugs.    Home Medications:  Acetaminophen, Senna, amLODIPine, atorvastatin, donepezil, levothyroxine, rivaroxaban, venlafaxine XR, and zolpidem    Allergies:  He has No Known Allergies.       Vital Signs:   Temp:  [97.5 °F (36.4 °C)] 97.5 °F (36.4 °C)  Heart Rate:  [65] 65  Resp:  [20] 20  BP: (104)/(67) 104/67  ENT  Physical Exam  Constitutional  Appearance: patient appears well-developed, cachectic, patient is cooperative;  Communication/Voice: communication appropriate for developmental age; Communication comments: Mild velopharyngeal scan  Constitutional comments: Wearing coat and hoodie sweatshirt  Wearing gloves  Head and Face  Appearance: head appears normal, face appears normal and face appears atraumatic;  Palpation: facial palpation normal;  Salivary: glands normal;  Ear  Hearing: intact;  Auricles: bilateral auricles normal;  External Mastoids: right external mastoid normal; left external mastoid normal;  Ear Canals: bilateral ear canals normal;  Tympanic Membranes: bilateral tympanic membranes normal;  Nose  External Nose: nares patent bilaterally; external nose normal;  Internal Nose: nasal mucosa normal; Nasal mucosa comments: R side mid synecia, very thin   nasal septal deviation present; deviation is to the left, septal deviation is severe; Septum comments: R to L mid severe   bilateral inferior turbinates edematous and erythematous;  Oral Cavity/Oropharynx  Lips: normal;  Teeth: missing teeth (L mandible with impnat) noted;  Gums: gingival recession present; Gum comments: gingivits  Tongue: surface is coated and smooth; Oral Tongue comments: very dry  Oral mucosa: mucous membranes dry; buccal mucosa Buccal Mucosa comments: thickened secretions  Hard palate: normal;  Soft palate: normal;  Tonsils: bilateral tonsils 1+, Tonsil comments: dry and coated  OC/OP comments: Trismus-opens to approximately 18 mm at the incisors  Neck  Neck: scars (Left neck dissection scar, well-healed) present; neck asymmetric (Status post left neck dissection); no neck mass present;  Thyroid: no thyroid mass present;  Neck comments: Left neck, well-healed  Very atrophic musculature of the right neck  Brawny induration left greater than right  Poor laryngeal elevation  Banding of neck muscles  Respiratory  Inspection: breathing  unlabored; normal breathing rate;  Cardiovascular  Inspection: extremities are warm and well perfused; no peripheral edema present;  Lymphatic  Palpation: no cervical adenopathy noted;  Neurovestibular  Mental Status: alert and oriented;  Psychiatric: mood normal; affect is appropriate;  Drawings         Laryngoscopy    Date/Time: 4/29/2024 11:33 AM    Performed by: Alli Card Jr., MD  Authorized by: Alli Card Jr., MD    Consent:     Consent obtained:  Verbal    Consent given by:  Patient    Alternatives discussed:  No treatment  Anesthesia (see MAR for exact dosages):     Anesthesia method:  Topical application    Topical anesthetic:  Tetracaine  Procedure details:     Indications: oncologic surveillance      Medication:  Afrin    Instrument: flexible fiberoptic laryngoscope      Scope location: left nare    Sinus/ Nasopharynx:     Right nasopharynx: patent and inflammation      Left nasopharynx: patent and inflammation      Right eustachian tube: patent      Left eustachian tube: inflammation, patent and inflammation    Oropharynx/ Supraglottis:     Posterior pharyngeal wall: inflamed      Oropharynx: inflammation      Oropharynx: no lesion      Vallecula: inflammation      Vallecula: no lesion      Base of tongue: inflammation      Base of tongue: no lesion      Epiglottis: omega shaped and inflammation       comment:  Very stiff and not closing of her larynx with swallow  Larynx/ Hypopharynx:     Arytenoids: inflammation and interarytenoid edema      Arytenoids: no lesions      Hypopharynx: inflammation and asymmetry      Pyriform sinus: inflammation      Pyriform sinus: no lesion and no pooling of secretions      False vocal cords: inflammation      True vocal cords: Yamilka's edema      True vocal cords: no immobility    Post-procedure details:     Patient tolerance of procedure:  Tolerated well  Comments:      Generalized mild inflammation from the pharynx down to the hypopharynx,  including supraglottis.  There are generalized radiation changes with no pooling of secretions.  There is dry mucosa.  No evidence of lesions on the left oropharynx and hypopharynx.  True vocal cords mobile  Epiglottis is poorly mobile for closure.     Result Review       RESULTS REVIEW    I have reviewed the patients old records in the chart.   I have reviewed the patients old records in the chart.        Assessment & Plan  Oral phase dysphagia    Oropharyngeal dysphagia    History of pharyngeal cancer    Stenosis of esophagus    Acquired hypothyroidism    Mucositis due to radiation therapy    Moderate protein-calorie malnutrition  Patient's Body mass index is 18.21 kg/m².  BMI indicates moderate protein-calorie malnutrition with health conditions related to an underlying condition that include trismus . Weight issue is worsening. BMI is is below average; BMI management plan is completed.  BMI management for patient includes the following: referral to dietitian and referral to primary care.  Trismus       Orders Placed This Encounter   Procedures    Ambulatory Referral to Speech Therapy    Flexible laryngoscopy       Diagnosis Plan   1. Oral phase dysphagia  Flexible laryngoscopy    Ambulatory Referral to Speech Therapy    Slowly worsening      2. Oropharyngeal dysphagia  Flexible laryngoscopy    Ambulatory Referral to Speech Therapy    Slowly worsening      3. History of pharyngeal cancer      No evidence of recurrence      4. Stenosis of esophagus        5. Acquired hypothyroidism        6. Mucositis due to radiation therapy      Stable      7. Moderate protein-calorie malnutrition      Mildly progressive      8. Trismus  Flexible laryngoscopy    Stable             Continue current management plan.  Patient appears to have brawny induration causing most of his symptoms.  His neck muscles are fixed.  He has some bowstringing of the left sternocleidomastoid muscle.  He has decreased shoulder elevation.  Muscles in  the neck including the strap muscles appear to be very fibrosed.  Feel this is causing his problem.  I have told him that the prognosis is not good for restoring this function.  Patient is insistent that speech therapy was able to care for him when he was in [place].  I have recommended speech therapy here.  I have also recommended that he travel to [place] for new evaluation at his previous site of therapy.  Speech therapy referral  Neck stretching  Electrical stimulation of swallowing muscles  Call for problems  Consider G-tube placement    My Chart:  Patient is using My Chart    Patient understand(s) and agree(s) with the treatment plan as described.    Return in about 3 months (around 7/29/2024) for Recheck trismus and swallowing, flex scope.        Electronically signed by Alli Card Jr, MD 04/29/24 11:41 AM CDT.

## 2024-04-29 NOTE — ASSESSMENT & PLAN NOTE
Patient's Body mass index is 18.21 kg/m².  BMI indicates moderate protein-calorie malnutrition with health conditions related to an underlying condition that include trismus . Weight issue is worsening. BMI is is below average; BMI management plan is completed.  BMI management for patient includes the following: referral to dietitian and referral to primary care.

## 2024-04-29 NOTE — LETTER
April 29, 2024     Fortino Sierra DO  [address]  [address]  [place] [facility] [facility] [address]    Patient: Fortino Dennis   YOB: 1950   Date of Visit: 4/29/2024     Dear Fortino Sierra DO:       Thank you for referring Fortino Dennis to me for evaluation. Below are the relevant portions of my assessment and plan of care.    If you have questions, please do not hesitate to call me. I look forward to following Fortino along with you.         Sincerely,        Alli Card Jr, MD        CC: No Recipients    Alli Card Jr., MD  04/29/24 1316  Sign when Signing Visit      Alli Card Jr, MD  [facility] EAR NOSE & THROAT  [address], [address]  [facility] [address]  Fax [phone]  Phone [phone]      Visit Type: FOLLOW UP   Chief Complaint   Patient presents with   • Cancer Surveillance           HPI  Accompanied by:No one  Fortino Dennis is a 73 y.o. male who presents for routine cancer follow up.  Patient continues with oropharyngeal and oral dysphagia related to tightening.  He says he is worsening.  He went to [place] for swallowing therapy.  He says he has had no improvement following this.  He states that in [place], he was on therapy all the time.  Swallowing, minimal. NP regurg with swallowing water rapidly.  If he takes this time, he does not have nasopharyngeal regurgitation.    Oncology History:  Oncology/Hematology History   Squamous cell carcinoma of pharynx   6/15/2023 Initial Diagnosis    Squamous cell carcinoma of pharynx     10/25/2023 - 10/25/2023 Other Event    ENT surveillance  Patient with no evidence of recurrent disease.  He has severe trismus  The left mandibular free flap appears intact  Neck has banding from prior multiple surgeries and radiation  Patient appears to be cancer free at this time.  He wishes continued surveillance.  Alli Card Jr, MD  10/25/2023  12:59 CDT        4/29/2024 - 4/29/2024 Other Event    ENT oncologic surveillance:  Oncologic surveillance carried out today.  No evidence of recurrence.  Likely given that the patient is greater than 5 years out.  Oral and oropharyngeal dysphagia worsening slowly.  I feel this is due to brawny induration.    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   • Sleep apnea    • Throat cancer    • Thyroid disorder    • Tinnitus 2020   • TMJ dysfunction 2020 wear        Past Surgical History:   Procedure Laterality Date   • CATARACT EXTRACTION WITH INTRAOCULAR LENS IMPLANT Bilateral     pt states x3 surgeries   • LARYNGOSCOPY Bilateral 7/7/2023    Procedure: MICRODIRECT LARYNGOSCOPY;  Surgeon: Alli Card Jr., MD;  Location: [facility] OR;  Service: ENT;  Laterality: Bilateral;   • 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   • MANDIBLE SURGERY  2021    removal of necrotic jaw (part of 1 of 2 surgeries)   • MASTECTOMY Left     removal of breast due to benign growth   • PANENDOSCOPY Bilateral 7/7/2023    Procedure: DIRECT LARYNGOSCOPY, ESOPHAGOSCOPY, BRONCHOSCOPY, MICRODIRECT LARYNGOSCOPY,OR ESOPHAGEAL DILATATION 38-48;  Surgeon: Alli Card Jr., MD;  Location: [facility] OR;  Service: ENT;  Laterality: Bilateral;   • THROAT SURGERY  2007    cancerous mass removed       Family History: His family history includes Cancer in his mother;  Colon cancer in his mother; Dementia in his mother; Hypertension in his mother.     Social History: He  reports that he has never smoked. He has never been exposed to tobacco smoke. He has never used smokeless tobacco. He reports current alcohol use of about 3.0 standard drinks of alcohol per week. He reports that he does not use drugs.    Home Medications:  Acetaminophen, Senna, amLODIPine, atorvastatin, donepezil, levothyroxine, rivaroxaban, venlafaxine XR, and zolpidem    Allergies:  He has No Known Allergies.       Vital Signs:   Temp:  [97.5 °F (36.4 °C)] 97.5 °F (36.4 °C)  Heart Rate:  [65] 65  Resp:  [20] 20  BP: (104)/(67) 104/67  ENT Physical Exam  Constitutional  Appearance: patient appears well-developed, cachectic, patient is cooperative;  Communication/Voice: communication appropriate for developmental age; Communication comments: Mild velopharyngeal scan  Constitutional comments: Wearing coat and hoodie sweatshirt  Wearing gloves  Head and Face  Appearance: head appears normal, face appears normal and face appears atraumatic;  Palpation: facial palpation normal;  Salivary: glands normal;  Ear  Hearing: intact;  Auricles: bilateral auricles normal;  External Mastoids: right external mastoid normal; left external mastoid normal;  Ear Canals: bilateral ear canals normal;  Tympanic Membranes: bilateral tympanic membranes normal;  Nose  External Nose: nares patent bilaterally; external nose normal;  Internal Nose: nasal mucosa normal; Nasal mucosa comments: R side mid synecia, very thin   nasal septal deviation present; deviation is to the left, septal deviation is severe; Septum comments: R to L mid severe   bilateral inferior turbinates edematous and erythematous;  Oral Cavity/Oropharynx  Lips: normal;  Teeth: missing teeth (L mandible with impnat) noted;  Gums: gingival recession present; Gum comments: gingivits  Tongue: surface is coated and smooth; Oral Tongue comments: very dry  Oral mucosa: mucous  membranes dry; buccal mucosa Buccal Mucosa comments: thickened secretions  Hard palate: normal;  Soft palate: normal;  Tonsils: bilateral tonsils 1+, Tonsil comments: dry and coated  OC/OP comments: Trismus-opens to approximately 18 mm at the incisors  Neck  Neck: scars (Left neck dissection scar, well-healed) present; neck asymmetric (Status post left neck dissection); no neck mass present;  Thyroid: no thyroid mass present;  Neck comments: Left neck, well-healed  Very atrophic musculature of the right neck  Brawny induration left greater than right  Poor laryngeal elevation  Banding of neck muscles  Respiratory  Inspection: breathing unlabored; normal breathing rate;  Cardiovascular  Inspection: extremities are warm and well perfused; no peripheral edema present;  Lymphatic  Palpation: no cervical adenopathy noted;  Neurovestibular  Mental Status: alert and oriented;  Psychiatric: mood normal; affect is appropriate;  Drawings         Laryngoscopy    Date/Time: 4/29/2024 11:33 AM    Performed by: Alli Card Jr., MD  Authorized by: Alli Card Jr., MD    Consent:     Consent obtained:  Verbal    Consent given by:  Patient    Alternatives discussed:  No treatment  Anesthesia (see MAR for exact dosages):     Anesthesia method:  Topical application    Topical anesthetic:  Tetracaine  Procedure details:     Indications: oncologic surveillance      Medication:  Afrin    Instrument: flexible fiberoptic laryngoscope      Scope location: left nare    Sinus/ Nasopharynx:     Right nasopharynx: patent and inflammation      Left nasopharynx: patent and inflammation      Right eustachian tube: patent      Left eustachian tube: inflammation, patent and inflammation    Oropharynx/ Supraglottis:     Posterior pharyngeal wall: inflamed      Oropharynx: inflammation      Oropharynx: no lesion      Vallecula: inflammation      Vallecula: no lesion      Base of tongue: inflammation      Base of tongue: no lesion       Epiglottis: omega shaped and inflammation       comment:  Very stiff and not closing of her larynx with swallow  Larynx/ Hypopharynx:     Arytenoids: inflammation and interarytenoid edema      Arytenoids: no lesions      Hypopharynx: inflammation and asymmetry      Pyriform sinus: inflammation      Pyriform sinus: no lesion and no pooling of secretions      False vocal cords: inflammation      True vocal cords: Yamilka's edema      True vocal cords: no immobility    Post-procedure details:     Patient tolerance of procedure:  Tolerated well  Comments:      Generalized mild inflammation from the pharynx down to the hypopharynx, including supraglottis.  There are generalized radiation changes with no pooling of secretions.  There is dry mucosa.  No evidence of lesions on the left oropharynx and hypopharynx.  True vocal cords mobile  Epiglottis is poorly mobile for closure.     Result Review      RESULTS REVIEW    I have reviewed the patients old records in the chart.   I have reviewed the patients old records in the chart.        Assessment & Plan  Oral phase dysphagia    Oropharyngeal dysphagia    History of pharyngeal cancer    Stenosis of esophagus    Acquired hypothyroidism    Mucositis due to radiation therapy    Moderate protein-calorie malnutrition  Patient's Body mass index is 18.21 kg/m².  BMI indicates moderate protein-calorie malnutrition with health conditions related to an underlying condition that include trismus . Weight issue is worsening. BMI is is below average; BMI management plan is completed.  BMI management for patient includes the following: referral to dietitian and referral to primary care.  Trismus       Orders Placed This Encounter   Procedures   • Ambulatory Referral to Speech Therapy   • Flexible laryngoscopy       Diagnosis Plan   1. Oral phase dysphagia  Flexible laryngoscopy    Ambulatory Referral to Speech Therapy    Slowly worsening      2. Oropharyngeal dysphagia  Flexible  laryngoscopy    Ambulatory Referral to Speech Therapy    Slowly worsening      3. History of pharyngeal cancer      No evidence of recurrence      4. Stenosis of esophagus        5. Acquired hypothyroidism        6. Mucositis due to radiation therapy      Stable      7. Moderate protein-calorie malnutrition      Mildly progressive      8. Trismus  Flexible laryngoscopy    Stable             Continue current management plan.  Patient appears to have brawny induration causing most of his symptoms.  His neck muscles are fixed.  He has some bowstringing of the left sternocleidomastoid muscle.  He has decreased shoulder elevation.  Muscles in the neck including the strap muscles appear to be very fibrosed.  Feel this is causing his problem.  I have told him that the prognosis is not good for restoring this function.  Patient is insistent that speech therapy was able to care for him when he was in [place].  I have recommended speech therapy here.  I have also recommended that he travel to [place] for new evaluation at his previous site of therapy.  Speech therapy referral  Neck stretching  Electrical stimulation of swallowing muscles  Call for problems  Consider G-tube placement    My Chart:  Patient is using My Chart    Patient understand(s) and agree(s) with the treatment plan as described.    Return in about 3 months (around 7/29/2024) for Recheck trismus and swallowing, flex scope.        Electronically signed by Alli Card Jr, MD 04/29/24 11:41 AM CDT.      no

## 2024-05-08 ENCOUNTER — OFFICE VISIT (OUTPATIENT)
Dept: INTERNAL MEDICINE | Facility: CLINIC | Age: 74
End: 2024-05-08
Payer: MEDICARE

## 2024-05-08 VITALS
RESPIRATION RATE: 16 BRPM | BODY MASS INDEX: 18.55 KG/M2 | HEIGHT: 73 IN | OXYGEN SATURATION: 98 % | WEIGHT: 140 LBS | HEART RATE: 59 BPM | TEMPERATURE: 97.5 F | SYSTOLIC BLOOD PRESSURE: 103 MMHG | DIASTOLIC BLOOD PRESSURE: 66 MMHG

## 2024-05-08 DIAGNOSIS — Z00.00 MEDICARE ANNUAL WELLNESS VISIT, SUBSEQUENT: Primary | ICD-10-CM

## 2024-05-08 DIAGNOSIS — I48.19 PERSISTENT ATRIAL FIBRILLATION: ICD-10-CM

## 2024-05-08 DIAGNOSIS — I10 PRIMARY HYPERTENSION: ICD-10-CM

## 2024-05-08 DIAGNOSIS — E03.9 ACQUIRED HYPOTHYROIDISM: ICD-10-CM

## 2024-05-08 DIAGNOSIS — Z23 ENCOUNTER FOR ADMINISTRATION OF COVID-19 VACCINE: ICD-10-CM

## 2024-05-08 DIAGNOSIS — I35.0 MILD AORTIC STENOSIS: ICD-10-CM

## 2024-05-08 DIAGNOSIS — R00.2 PALPITATIONS: ICD-10-CM

## 2024-05-08 PROCEDURE — 91320 SARSCV2 VAC 30MCG TRS-SUC IM: CPT | Performed by: NURSE PRACTITIONER

## 2024-05-08 PROCEDURE — 3078F DIAST BP <80 MM HG: CPT | Performed by: NURSE PRACTITIONER

## 2024-05-08 PROCEDURE — 1170F FXNL STATUS ASSESSED: CPT | Performed by: NURSE PRACTITIONER

## 2024-05-08 PROCEDURE — 99214 OFFICE O/P EST MOD 30 MIN: CPT | Performed by: NURSE PRACTITIONER

## 2024-05-08 PROCEDURE — 1126F AMNT PAIN NOTED NONE PRSNT: CPT | Performed by: NURSE PRACTITIONER

## 2024-05-08 PROCEDURE — 1159F MED LIST DOCD IN RCRD: CPT | Performed by: NURSE PRACTITIONER

## 2024-05-08 PROCEDURE — 3074F SYST BP LT 130 MM HG: CPT | Performed by: NURSE PRACTITIONER

## 2024-05-08 PROCEDURE — G0439 PPPS, SUBSEQ VISIT: HCPCS | Performed by: NURSE PRACTITIONER

## 2024-05-08 PROCEDURE — 1160F RVW MEDS BY RX/DR IN RCRD: CPT | Performed by: NURSE PRACTITIONER

## 2024-05-08 PROCEDURE — 90480 ADMN SARSCOV2 VAC 1/ONLY CMP: CPT | Performed by: NURSE PRACTITIONER

## 2024-05-08 PROCEDURE — 93000 ELECTROCARDIOGRAM COMPLETE: CPT | Performed by: NURSE PRACTITIONER

## 2024-05-17 ENCOUNTER — OFFICE VISIT (OUTPATIENT)
Dept: PHYSICAL THERAPY | Facility: CLINIC | Age: 74
End: 2024-05-17
Payer: MEDICARE

## 2024-05-17 DIAGNOSIS — R13.12 OROPHARYNGEAL DYSPHAGIA: Primary | ICD-10-CM

## 2024-05-17 DIAGNOSIS — R25.2 TRISMUS: ICD-10-CM

## 2024-05-17 NOTE — PROGRESS NOTES
Speech/Language Pathology Initial Evaluation and Plan of Care  [address] [place] [address]    Patient: Fortino Dennis               : 1950  Visit Date: 2024  Referring practitioner: Alli Prakash  Date of Initial Visit: 2024  Patient seen for 1 sessions    Visit Diagnoses:    ICD-10-CM ICD-9-CM   1. 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    Sleep apnea     Throat cancer     Thyroid disorder     Tinnitus     TMJ dysfunction  wear      Past Surgical History:   Procedure Laterality Date    CATARACT EXTRACTION WITH INTRAOCULAR LENS IMPLANT Bilateral     pt states x3 surgeries    LARYNGOSCOPY Bilateral 2023    Procedure: MICRODIRECT LARYNGOSCOPY;  Surgeon: Alli Card Jr., MD;  Location: [facility];  Service: ENT;  Laterality: Bilateral;    MANDIBLE SURGERY      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    MANDIBLE SURGERY      removal of necrotic jaw (part of 1 of 2 surgeries)    MASTECTOMY Left     removal of breast due to benign growth    PANENDOSCOPY Bilateral 2023    Procedure: DIRECT LARYNGOSCOPY, ESOPHAGOSCOPY, BRONCHOSCOPY, MICRODIRECT LARYNGOSCOPY,OR ESOPHAGEAL DILATATION 38-48;  Surgeon: Alli Card Jr., MD;  Location: [facility];  Service: ENT;  Laterality: Bilateral;    THROAT SURGERY      cancerous mass removed       SUBJECTIVE     Subjective:   Patient presents with the following symptoms: coughing, choking, drooling, difficulty swallowing solids, difficulty  swallowing liquids, difficulty swallowing pills, food getting stuck, weight loss, history of aspiration, globus, and esophageal symptoms   Date of Onset: 2007  History of present problems: throat cancer diagnosed and treated in 2007, free flap mandible reconstruction 2021. He was seen for therapy here in 2023, he was discharged due to wanting to have the Vitalstim brand of estim which is not available here. He went to [facility], but theydid not have it either so he has not had any therapy since that time. He did previously have a PEG tube for nutrition, but it came out and patient chose not to have it replaced. Prior to moving to [place], the patient had therapy in [place], which he felt was beneficial to him.   The functional impact on the patient: Risk for aspiration pneumonia.   Prior level of function: WFL prior to surgery, he feels he was doing well until his jaw reconstruction surgery in 2021.   Pertinent Medical History Related to this Problem: Head and neck cancer s/p radiation and surgery, jaw reconstruction surgery due to osteonecrosis of the jaw, TMJ disorder, trismus, sleep apnea. See chart for full history.   Current Diet Level:   Solid: 3 - Liquidized and 4 - Puree  Liquid: 0 - Thin  Non-oral Feeding: N/A and Peg tube not replaced per patient.     OBJECTIVE     ORAL MECH  Respiratory/Swallow Coordination: Moderately impaired  Position During Evaluation: Upright  Anatomy/Physiology: Patient presents with surgical and radiation changes due to head and neck cancer  Dentition: Patient has own teeth and Implants in jaw reconstruction  Oral Health: Poor oral health  Awareness/Control of Secretions: Patient wipes mouth, There is anterior loss of secretions and Patient is aware of drooling  Jaw ROM: vertical 24mm, latera R 5mm, lateral L 4mm    CLINICAL OBSERVATIONS  Method of Food Administration: Cup with thin water  Oral Phase: Impaired lip closure, severely reduced tongue elevation, severely reduced tongue  lateralization (bilateral), severely reduced tongue anterior/posterior movement, decreased oral sensation (left), reduced tone in cheeks (L>R), labial droop (left), reduced tone in lips (bilateral) and reduced tone in tongue (bilateral). Patient presents with submental swelling which is suspicious for lymphedema.   Pharyngeal Symptoms: multiple swallows with consistencies of thin water, coughing with consistencies of thin water and throat clearing with consistencies of thin water and habitual throat clearing.   Summary of Clinical Exam: Severe oral pharyngeal dysphagia, Mild dysarthria      INSTRUMENTAL ASSESSMENT  Instrumental Exam Completed?: Previous VFSS completed 3/29/23. Repeat study warranted as it has been over a year since his last evaluation.   Severity Level of Dysphagia: moderate dysphagia and severe dysphagia  Consistencies Aspirated/Penetrated: Aspirated: thin, penetration on all other consistencies  Summary Statement: Across of trials the patient presented with globally weak swallow with poor elevation, poor epiglottic inversion, and poor posterior stripping wave   Please see SLP report of VFSS, repeat study warranted.     IMPRESSION/RECOMMENDATIONS  Factors Affecting Performance: no difficulty participating in study  Learning Motivation: strong  Education/Learning Comments: Patient demonstrated understanding of evaluation results and plan of care.     PATIENT SELF ASSESSMENT    EAT 10  0= No problem 4= Severe problem  My swallowing has caused me to lose weight 1   My Swallowing problem interferes with my ability to go out for meals  3   Swallowing liquids takes extra effort 1   Swallowing solids takes extra effort 2   Swallowing pills takes extra effort 1   Swallowing is painful 0   The pleasure of eating is affected by my swallowing 3   When I swallow, food sticks in my throat 1   I cough when I eat 1   Swallowing is stressful 1                                                                                                   Total Score 14     0-9 Minimal  10-19 Mild  20-29 Moderate 30-40 Severe      Clinical Impression:   Severe: oropharyngeal phase dysphagia and esophageal dysphagia  Impact on Function: risk for inadequate nutrition, inadequate hydration, aspiration, pneumonia, and social aspects of eating  Prognosis Comment: fair      Therapy Education/Self Care    Details: Evaluation results and POC   Given home strategies   Progress: New   Education provided to:  Patient   Level of understanding Verbalized           Total Time of Visit:            60   mins    ASSESSMENT/PLAN     Goals                                            STG  Comments Status   Patient will improve oral skills to enhance safety and increase eating efficiency and bolus control as measured by increased lip closure, decreased anterior loss of bolus, improved bolus formation, improved posterior propulsion of the bolus, decreased drooling, and increased jaw ROM with decreased jaw pain .     Patient will improve hyolaryngeal elevation, improve epiglottic inversion, improve laryngeal closure, increase base of the tongue strength and posterior pharyngeal wall excursion, and increase efficiency of cough to clear residue from the airway as measured by decreased overt signs and symptoms of aspiration and decreased penetration and aspiration on repeat instrumental swallow study, if applicable.      Patient will report decreased difficulty with swallowing and improved EAT-10 score.     Patient will improve oral hygiene to enhance safety and decrease risk of aspiration pneumonia     LTG      Patient will safely consume PO diet of 0 - Thin drinks and 4 - Puree foods to maintain nutrition/hydration without complications such as aspiration or pneumonia.        ASSESSMENT:   Patient is indicated for skilled care by a Speech/Language Pathologist.     Summary of Assessment: Patient presents with moderate-severe dysphagia which has been ongoing. He feels  some worsening. He would like to get back to therapy to improve his swallow and reduce risks of aspiration.     Recommendations for Diet/Nutrition: aggressive oral care, Drinks: 0 - Thin, and Solids: 3 - Liquidized and 4 - Puree  Swallowing Precautions: upright position for at least 30 minutes after meals, alternative liquids and solids while eating, small sips and bites when eating, slow rate; empty mouth between bites, behavior changes to manage reflux, and discuss medical management of reflux with physician  Therapy Recommendations: dysphagia therapy to address swallowing deficits  Screening indicates the further need for Physical Therapy for lymphedema assessent and VFSS.    PLAN:  Details of Plan of Care: Initiate therapy. Patient wishes for estim. Refer to PT, CLT for lymphedema assess and treat.     Frequency: 2 times per week  Duration: 12 visits  Estimated Length of Session: 45 minutes    SPEECH/LANGUAGE PATHOLOGIST SIGNATURE: Sosa Membreno, CCC-SLP, [place] License #: [id number]  Electronically Signed on 5/17/2024        Initial Certification  Certification Period: 5/17/2024 through 8/14/2024  I certify that the therapy services are furnished while this patient is under my care.  The services outlined above are required by this patient, and will be reviewed every 90 days.     PHYSICIAN: Alli Card Jr., MD (NPI: [id number])    Signature:____________________________________________DATE: _________     Please sign and return via fax to [phone].   Thank you so much for letting us work with Fortino. I appreciate your letting us work with your patients. If you have any questions or concerns, please don't hesitate to contact me.          [address] [address]  [phone]

## 2024-05-20 NOTE — PROGRESS NOTES
I have reviewed the notes, assessments, and/or procedures performed. I concur with her/his documentation of Fortino Dennis.    Alli Card Jr, MD  5/20/2024  07:07 CDT

## 2024-05-21 ENCOUNTER — HOSPITAL ENCOUNTER (OUTPATIENT)
Dept: CARDIOLOGY | Facility: HOSPITAL | Age: 74
Discharge: HOME | End: 2024-05-21
Admitting: NURSE PRACTITIONER
Payer: MEDICARE

## 2024-05-21 DIAGNOSIS — R00.2 PALPITATIONS: ICD-10-CM

## 2024-05-21 PROCEDURE — 93246 EXT ECG>7D<15D RECORDING: CPT

## 2024-05-24 ENCOUNTER — TREATMENT (OUTPATIENT)
Dept: PHYSICAL THERAPY | Facility: CLINIC | Age: 74
End: 2024-05-24
Payer: MEDICARE

## 2024-05-24 DIAGNOSIS — R13.12 OROPHARYNGEAL DYSPHAGIA: Primary | ICD-10-CM

## 2024-05-24 DIAGNOSIS — R25.2 TRISMUS: ICD-10-CM

## 2024-05-24 NOTE — PROGRESS NOTES
Speech Language Pathology Treatment Note  [address] [place] [address]    Patient: Fortino Dennis                                                                                     Visit Date: 2024  :     1950    Referring practitioner:    Alli Card Jr*  Date of Initial Visit:          Type: THERAPY  Noted: 2024    Patient seen for 2 sessions    Visit Diagnoses:    ICD-10-CM ICD-9-CM   1. Oropharyngeal dysphagia  R13.12 787.22   2. Trismus  R25.2 781.0     SUBJECTIVE     Patient arrived alert and ready for therapy.     OBJECTIVE   GOALS  Goals                                            STG  Comments Status   Patient will improve oral skills to enhance safety and increase eating efficiency and bolus control as measured by increased lip closure, decreased anterior loss of bolus, improved bolus formation, improved posterior propulsion of the bolus, decreased drooling, and increased jaw ROM with decreased jaw pain .  Reintroduced tongue exercises. Patient demonstrated understanding, given tongue depressors for home exercise.  New   Patient will improve hyolaryngeal elevation, improve epiglottic inversion, improve laryngeal closure, increase base of the tongue strength and posterior pharyngeal wall excursion, and increase efficiency of cough to clear residue from the airway as measured by decreased overt signs and symptoms of aspiration and decreased penetration and aspiration on repeat instrumental swallow study, if applicable.   Resumed therapy with NMES (estim) for swallowing. Patient able to complete 30 min of estim at A2 (fibrotic) at level 22. Patient stated that he could feel the stimulation but that it was not strong. He started with taking a sip of water with each swallow but was able to begin weaning off of the sips 1/2 way through the session.   Reviewed supraglottic swallow, EMST, and effortful swallow for  home exercises.   New   Patient will report decreased difficulty with swallowing and improved EAT-10 score.  Initial eat 10 score 14, Mild  Ongoing   Patient will improve oral hygiene to enhance safety and decrease risk of aspiration pneumonia  Discussed oral hygiene and gave handout New   LTG        Patient will safely consume PO diet of 0 - Thin drinks and 4 - Puree foods to maintain nutrition/hydration without complications such as aspiration or pneumonia.   Patient is currently on PO diet. He c/o difficulty with coughing, extra effort, and difficulty eating around others.   New       Therapy Education/Self Care    Details: POC   Given Home exercises and education.    Progress: New   Education provided to:  Patient   Level of understanding Verbalized             Total Time of Visit:             45   mins         ASSESSMENT/PLAN     ASSESSMENT: Patient able to complete swallowing with estim for 30 min.     PLAN: Continue therapy and home exercises.     Progress Note Due Date:6/14/2024  Recertification Due Date:8/14/2024    SIGNATURE: Sosa Membreno CCC-SLP, [place] License #: [id number]  Electronically Signed on 5/24/2024          [place] [address]  [phone]

## 2024-06-04 ENCOUNTER — TREATMENT (OUTPATIENT)
Dept: PHYSICAL THERAPY | Facility: CLINIC | Age: 74
End: 2024-06-04
Payer: MEDICARE

## 2024-06-04 DIAGNOSIS — R13.12 OROPHARYNGEAL DYSPHAGIA: Primary | ICD-10-CM

## 2024-06-04 DIAGNOSIS — R25.2 TRISMUS: ICD-10-CM

## 2024-06-04 PROCEDURE — 92526 ORAL FUNCTION THERAPY: CPT | Performed by: SPEECH-LANGUAGE PATHOLOGIST

## 2024-06-04 NOTE — PROGRESS NOTES
Speech Language Pathology Treatment Note  [address] [place] [address]    Patient: Fortino Dennis                                                                                     Visit Date: 2024  :     1950    Referring practitioner:    Alli Card Jr*  Date of Initial Visit:          Type: THERAPY  Noted: 2024    Patient seen for 3 sessions    Visit Diagnoses:    ICD-10-CM ICD-9-CM   1. Oropharyngeal dysphagia  R13.12 787.22   2. Trismus  R25.2 781.0     SUBJECTIVE     Patient arrived alert and ready for therapy.     OBJECTIVE   GOALS  Goals                                            STG  Comments Status   Patient will improve oral skills to enhance safety and increase eating efficiency and bolus control as measured by increased lip closure, decreased anterior loss of bolus, improved bolus formation, improved posterior propulsion of the bolus, decreased drooling, and increased jaw ROM with decreased jaw pain .  Patient demonstrated  tongue exercises with minimal cues Ongoing   Patient will improve hyolaryngeal elevation, improve epiglottic inversion, improve laryngeal closure, increase base of the tongue strength and posterior pharyngeal wall excursion, and increase efficiency of cough to clear residue from the airway as measured by decreased overt signs and symptoms of aspiration and decreased penetration and aspiration on repeat instrumental swallow study, if applicable.  Completed swallow exercise with NMES (estim). Patient able to complete 30 min of estim at A2 (fibrotic) at level 22. Patient stated that he could feel the stimulation but that it was not strong.Reviewed supraglottic swallow, EMST, and effortful swallow for home exercises.   New   Patient will report decreased difficulty with swallowing and improved EAT-10 score.  Initial eat 10 score 14, Mild  Ongoing   Patient will improve oral hygiene to  enhance safety and decrease risk of aspiration pneumonia  Discussed patient ordering an Or[employer] Press for trismus to aid in oral opening for eating and oral care. ROM scale opening at evaluation on 5/17/24 was 24mm. Previous March 2023 was 29mm, 5mm reduction in ROM. Patient signed release Ongoing   LTG        Patient will safely consume PO diet of 0 - Thin drinks and 4 - Puree foods to maintain nutrition/hydration without complications such as aspiration or pneumonia.   Patient is currently on PO diet. He c/o difficulty with coughing, extra effort, and difficulty eating around others. Completing exercises.   Ongoing       Therapy Education/Self Care    Details: POC   Given Home exercises and education.    Progress: New   Education provided to:  Patient   Level of understanding Verbalized             Total Time of Visit:             45   mins         ASSESSMENT/PLAN     ASSESSMENT: Patient able to complete swallowing with estim for 30 min.     PLAN: Continue therapy and home exercises. SLP will request info on [employer] Press for patient. Patient signed release.     Progress Note Due Date:6/14/2024  Recertification Due Date:8/14/2024    SIGNATURE: Sosa Membreno CCC-SLP, [place] License #: [id number]  Electronically Signed on 6/4/2024          [address] [place] [address] [place] [address]  [phone]

## 2024-06-07 ENCOUNTER — TREATMENT (OUTPATIENT)
Dept: PHYSICAL THERAPY | Facility: CLINIC | Age: 74
End: 2024-06-07
Payer: MEDICARE

## 2024-06-07 DIAGNOSIS — R13.12 OROPHARYNGEAL DYSPHAGIA: Primary | ICD-10-CM

## 2024-06-07 DIAGNOSIS — R25.2 TRISMUS: ICD-10-CM

## 2024-06-07 NOTE — PROGRESS NOTES
Speech Language Pathology Treatment Note  [address] [place] [address]    Patient: Fortino Dennis                                                                                     Visit Date: 2024  :     1950    Referring practitioner:    Alli Card Jr*  Date of Initial Visit:          Type: THERAPY  Noted: 2024    Patient seen for 4 sessions    Visit Diagnoses:    ICD-10-CM ICD-9-CM   1. Oropharyngeal dysphagia  R13.12 787.22   2. Trismus  R25.2 781.0     SUBJECTIVE     Patient arrived alert and ready for therapy. He reports no changes since last visit.     OBJECTIVE   GOALS  Goals                                            STG  Comments Status   Patient will improve oral skills to enhance safety and increase eating efficiency and bolus control as measured by increased lip closure, decreased anterior loss of bolus, improved bolus formation, improved posterior propulsion of the bolus, decreased drooling, and increased jaw ROM with decreased jaw pain .  Patient demonstrated  tongue exercises with minimal cues.  Ongoing   Patient will improve hyolaryngeal elevation, improve epiglottic inversion, improve laryngeal closure, increase base of the tongue strength and posterior pharyngeal wall excursion, and increase efficiency of cough to clear residue from the airway as measured by decreased overt signs and symptoms of aspiration and decreased penetration and aspiration on repeat instrumental swallow study, if applicable.  Completed swallow exercise with NMES (estim). Patient able to complete 30 min of estim at A2 (fibrotic) at level 22. Patient reports no problems with supraglottic swallow, EMST, and effortful swallow for home exercises.  Ongoing   Patient will report decreased difficulty with swallowing and improved EAT-10 score.  Initial eat 10 score 14, Mild  Ongoing   Patient will improve oral hygiene to enhance  safety and decrease risk of aspiration pneumonia  Previous:  ROM scale opening at evaluation on 5/17/24 was 24mm. Previous March 2023 was 29mm, 5mm reduction in ROM.  Ongoing   LTG        Patient will safely consume PO diet of 0 - Thin drinks and 4 - Puree foods to maintain nutrition/hydration without complications such as aspiration or pneumonia.   Patient is currently on PO diet. He c/o difficulty with coughing, extra effort, and difficulty eating around others. Completing exercises.   Ongoing       Therapy Education/Self Care    Details: POC   Given Home exercises and education.    Progress: Reinforced   Education provided to:  Patient   Level of understanding Verbalized             Total Time of Visit:             45   mins         ASSESSMENT/PLAN     ASSESSMENT: Patient able to complete swallowing with estim for 30 min.     PLAN: Continue therapy and home exercises. Information requested for [employer].     Progress Note Due Date:6/14/2024  Recertification Due Date:8/14/2024    SIGNATURE: Sosa Membreno CCC-SLP, [place] License #: [id number]  Electronically Signed on 6/7/2024          [facility] [place] [address]  [phone]

## 2024-06-12 ENCOUNTER — TREATMENT (OUTPATIENT)
Dept: PHYSICAL THERAPY | Facility: CLINIC | Age: 74
End: 2024-06-12
Payer: MEDICARE

## 2024-06-12 DIAGNOSIS — R13.12 OROPHARYNGEAL DYSPHAGIA: Primary | ICD-10-CM

## 2024-06-12 DIAGNOSIS — G47.00 INSOMNIA, UNSPECIFIED TYPE: ICD-10-CM

## 2024-06-12 RX ORDER — ZOLPIDEM TARTRATE 10 MG/1
10 TABLET ORAL NIGHTLY PRN
Qty: 30 TABLET | Refills: 1 | Status: SHIPPED | OUTPATIENT
Start: 2024-06-12

## 2024-06-12 NOTE — PROGRESS NOTES
Speech Language Pathology Treatment Note and 30 Day Progress Note  [address] [place] [address]    Patient: Fortino Dennis                                                                                     Visit Date: 2024  :     1950    Referring practitioner:    Alli Card Jr*  Date of Initial Visit:          Type: THERAPY  Noted: 2024    Patient seen for 5 sessions    Visit Diagnoses:    ICD-10-CM ICD-9-CM   1. Oropharyngeal dysphagia  R13.12 787.22     SUBJECTIVE     Patient arrived alert and ready for therapy. He reports no changes since last visit.     OBJECTIVE   GOALS  Goals                                            STG  Comments Status   Patient will improve oral skills to enhance safety and increase eating efficiency and bolus control as measured by increased lip closure, decreased anterior loss of bolus, improved bolus formation, improved posterior propulsion of the bolus, decreased drooling, and increased jaw ROM with decreased jaw pain .  Previous: Patient demonstrated  tongue exercises with minimal cues.  Ongoing   Patient will improve hyolaryngeal elevation, improve epiglottic inversion, improve laryngeal closure, increase base of the tongue strength and posterior pharyngeal wall excursion, and increase efficiency of cough to clear residue from the airway as measured by decreased overt signs and symptoms of aspiration and decreased penetration and aspiration on repeat instrumental swallow study, if applicable.  Completed swallow exercise with NMES (estim). Patient able to complete 30 min of estim at A2 (fibrotic) at level 22. Patient reports no problems with supraglottic swallow, EMST, and effortful swallow for home exercises.  Ongoing   Patient will report decreased difficulty with swallowing and improved EAT-10 score.  Initial eat 10 score 14, Mild  Ongoing   Patient will improve oral hygiene to  enhance safety and decrease risk of aspiration pneumonia  Previous:  ROM scale opening at evaluation on 5/17/24 was 24mm. Previous March 2023 was 29mm, 5mm reduction in ROM.  Ongoing   LTG        Patient will safely consume PO diet of 0 - Thin drinks and 4 - Puree foods to maintain nutrition/hydration without complications such as aspiration or pneumonia.   Patient is currently on PO diet. He c/o difficulty with coughing, extra effort, and difficulty eating around others. Completing exercises.   Ongoing       Therapy Education/Self Care    Details: POC   Given Home exercises and education.    Progress: Reinforced   Education provided to:  Patient   Level of understanding Verbalized             Total Time of Visit:             45   mins         ASSESSMENT/PLAN     ASSESSMENT: Patient able to complete swallowing with estim for 30 min.     PLAN: Continue therapy and home exercises.    Progress Note Due Date:6/14/2024  Recertification Due Date:8/14/2024    SIGNATURE: Sabiha Sánchez, Speech Therapy Student  Electronically Signed on 6/12/2024    The clinical instructor and/or supervising staff, Sosa Membreno CCC-SLP, was present in clinic guiding the visit by approving, concurring, and confirming the skilled judgement for all services rendered.    Signature:  Sosa Membreno CCC-SLP, [place] License #: [id number]  Electronically signed on 6/12/2024          [facility] [place] [address]  [phone]

## 2024-06-14 ENCOUNTER — TREATMENT (OUTPATIENT)
Dept: PHYSICAL THERAPY | Facility: CLINIC | Age: 74
End: 2024-06-14
Payer: MEDICARE

## 2024-06-14 DIAGNOSIS — R13.12 OROPHARYNGEAL DYSPHAGIA: Primary | ICD-10-CM

## 2024-06-14 NOTE — PROGRESS NOTES
Speech Language Pathology Treatment Note and 30 Day Progress Note  [address] [place] [address]    Patient: Fortino Dennis                                                                                     Visit Date: 2024  :     1950    Referring practitioner:    Alli Card Jr*  Date of Initial Visit:          Type: THERAPY  Noted: 2024    Patient seen for 6 sessions    Visit Diagnoses:    ICD-10-CM ICD-9-CM   1. Oropharyngeal dysphagia  R13.12 787.22     SUBJECTIVE     Patient arrived ready for therapy today. He reports no changes since last visit but expressed complaints of a headache.    OBJECTIVE   GOALS  Goals                                            STG  Comments Status   Patient will improve oral skills to enhance safety and increase eating efficiency and bolus control as measured by increased lip closure, decreased anterior loss of bolus, improved bolus formation, improved posterior propulsion of the bolus, decreased drooling, and increased jaw ROM with decreased jaw pain . Not addressed today. Previous: Patient demonstrated  tongue exercises with minimal cues.  Ongoing   Patient will improve hyolaryngeal elevation, improve epiglottic inversion, improve laryngeal closure, increase base of the tongue strength and posterior pharyngeal wall excursion, and increase efficiency of cough to clear residue from the airway as measured by decreased overt signs and symptoms of aspiration and decreased penetration and aspiration on repeat instrumental swallow study, if applicable.  Completed swallow exercise with NMES (estim). Patient able to complete 30 min of estim at A2 (fibrotic) at level 22. Patient reports that he has not been doing at home exercises including supraglottic swallow, EMST, and effortful swallow. SLP expressed importance of these exercises. Ongoing   Patient will report decreased difficulty with  swallowing and improved EAT-10 score.  Initial eat 10 score 14, Mild  Ongoing   Patient will improve oral hygiene to enhance safety and decrease risk of aspiration pneumonia Not addressed today. Previous: ROM scale opening at evaluation on 5/17/24 was 24mm. Previous March 2023 was 29mm, 5mm reduction in ROM.  Ongoing   LTG        Patient will safely consume PO diet of 0 - Thin drinks and 4 - Puree foods to maintain nutrition/hydration without complications such as aspiration or pneumonia.   Patient is currently on PO diet. He c/o difficulty with coughing, extra effort, and difficulty eating around others. He was encouraged to complete his exercises more consistently at home.   Ongoing       Therapy Education/Self Care    Details: POC   Given Home exercises and education.    Progress: Reinforced   Education provided to:  Patient   Level of understanding Verbalized             Total Time of Visit:             45   mins         ASSESSMENT/PLAN     ASSESSMENT: Patient able to complete swallowing with estim for 30 min.    PLAN: Continue therapy and home exercises.    Progress Note Due Date:7/14/2024  Recertification Due Date:8/14/2024    SIGNATURE: Sabiha Sánchez, Speech Therapy Student  Electronically Signed on 6/14/2024    The clinical instructor and/or supervising staff, Sosa Membreno CCC-SLP, was present in clinic guiding the visit by approving, concurring, and confirming the skilled judgement for all services rendered.    Signature:  Sosa Membreno CCC-SLP  Electronically signed on 6/14/2024          [place] [address]  [phone]

## 2024-06-19 ENCOUNTER — TREATMENT (OUTPATIENT)
Dept: PHYSICAL THERAPY | Facility: CLINIC | Age: 74
End: 2024-06-19
Payer: MEDICARE

## 2024-06-19 DIAGNOSIS — R25.2 TRISMUS: ICD-10-CM

## 2024-06-19 DIAGNOSIS — R13.12 OROPHARYNGEAL DYSPHAGIA: Primary | ICD-10-CM

## 2024-06-19 NOTE — PROGRESS NOTES
Speech Language Pathology Treatment Note  [address] [place] [address]    Patient: Fortino Dennis                                                                                     Visit Date: 2024  :     1950    Referring practitioner:    Alli Card Jr*  Date of Initial Visit:          Type: THERAPY  Noted: 2024    Patient seen for 7 sessions    Visit Diagnoses:    ICD-10-CM ICD-9-CM   1. Oropharyngeal dysphagia  R13.12 787.22   2. Trismus  R25.2 781.0     SUBJECTIVE     Patient arrived ready for therapy today. He reports no changes since last visit, he is feeling better today.       OBJECTIVE   GOALS  Goals                                            STG  Comments Status   Patient will improve oral skills to enhance safety and increase eating efficiency and bolus control as measured by increased lip closure, decreased anterior loss of bolus, improved bolus formation, improved posterior propulsion of the bolus, decreased drooling, and increased jaw ROM with decreased jaw pain . Not addressed today. Previous: Patient demonstrated  tongue exercises with minimal cues.  Ongoing   Patient will improve hyolaryngeal elevation, improve epiglottic inversion, improve laryngeal closure, increase base of the tongue strength and posterior pharyngeal wall excursion, and increase efficiency of cough to clear residue from the airway as measured by decreased overt signs and symptoms of aspiration and decreased penetration and aspiration on repeat instrumental swallow study, if applicable.  Completed swallow exercise with NMES (estim). Patient able to complete 30 min of estim at A2 (fibrotic) at level 22. Patient reports that he has been trying to do his exercises.  Ongoing   Patient will report decreased difficulty with swallowing and improved EAT-10 score.  Initial eat 10 score 14, Mild. No changes reported today.   Ongoing    Patient will improve oral hygiene to enhance safety and decrease risk of aspiration pneumonia Not addressed today. Previous: ROM scale opening at evaluation on 5/17/24 was 24mm. Previous March 2023 was 29mm, 5mm reduction in ROM.  Ongoing   LTG        Patient will safely consume PO diet of 0 - Thin drinks and 4 - Puree foods to maintain nutrition/hydration without complications such as aspiration or pneumonia.   Patient is currently on PO diet. He c/o difficulty with coughing, extra effort, and difficulty eating around others. He was encouraged to complete his exercises more consistently at home.   Ongoing       Therapy Education/Self Care    Details: POC   Given Home exercises and education.    Progress: Reinforced   Education provided to:  Patient   Level of understanding Verbalized             Total Time of Visit:             45   mins         ASSESSMENT/PLAN     ASSESSMENT: Patient able to complete swallowing with estim for 30 min.    PLAN: Continue therapy and home exercises.    Progress Note Due Date:7/14/2024  Recertification Due Date:8/14/2024    Signature:  Sosa Membreno CCC-SLP  Electronically signed on 6/19/2024          [facility] [place] [address]  [phone]

## 2024-06-21 ENCOUNTER — TREATMENT (OUTPATIENT)
Dept: PHYSICAL THERAPY | Facility: CLINIC | Age: 74
End: 2024-06-21
Payer: MEDICARE

## 2024-06-21 DIAGNOSIS — R13.12 OROPHARYNGEAL DYSPHAGIA: Primary | ICD-10-CM

## 2024-06-21 DIAGNOSIS — R25.2 TRISMUS: ICD-10-CM

## 2024-06-21 NOTE — PROGRESS NOTES
Speech Language Pathology Treatment Note  [address] [place] [address]    Patient: Fortino Dennis                                                                                     Visit Date: 2024  :     1950    Referring practitioner:    Alli Card Jr*  Date of Initial Visit:          Type: THERAPY  Noted: 2024    Patient seen for 8 sessions    Visit Diagnoses:    ICD-10-CM ICD-9-CM   1. Oropharyngeal dysphagia  R13.12 787.22   2. Trismus  R25.2 781.0     SUBJECTIVE     Patient arrived ready for therapy today. He reports no changes since last visit, he is feeling better today.       OBJECTIVE   GOALS  Goals                                            STG  Comments Status   Patient will improve oral skills to enhance safety and increase eating efficiency and bolus control as measured by increased lip closure, decreased anterior loss of bolus, improved bolus formation, improved posterior propulsion of the bolus, decreased drooling, and increased jaw ROM with decreased jaw pain . Not addressed today. Previous: Patient demonstrated  tongue exercises with minimal cues.  Ongoing   Patient will improve hyolaryngeal elevation, improve epiglottic inversion, improve laryngeal closure, increase base of the tongue strength and posterior pharyngeal wall excursion, and increase efficiency of cough to clear residue from the airway as measured by decreased overt signs and symptoms of aspiration and decreased penetration and aspiration on repeat instrumental swallow study, if applicable.  Completed swallow exercise with NMES (estim). Patient able to complete 30 min of estim at A2 (fibrotic) at level 23.5 today as patient stated it was not strong enough. Patient reports that he has not been doing his home exercises.  Ongoing   Patient will report decreased difficulty with swallowing and improved EAT-10 score.  Initial eat 10 score  14, Mild. No changes reported today.   Ongoing   Patient will improve oral hygiene to enhance safety and decrease risk of aspiration pneumonia Not addressed today. Previous: ROM scale opening at evaluation on 5/17/24 was 24mm. Previous March 2023 was 29mm, 5mm reduction in ROM.  Ongoing   LTG        Patient will safely consume PO diet of 0 - Thin drinks and 4 - Puree foods to maintain nutrition/hydration without complications such as aspiration or pneumonia.   Patient is currently on PO diet. He c/o difficulty with coughing, extra effort, and difficulty eating around others. He was encouraged to complete his exercises more consistently at home.   Ongoing       Therapy Education/Self Care    Details: POC   Given Home exercises and education.    Progress: Reinforced   Education provided to:  Patient   Level of understanding Verbalized             Total Time of Visit:             45   mins         ASSESSMENT/PLAN     ASSESSMENT: Patient able to complete swallowing with estim for 30 min.    PLAN: Continue therapy and home exercises.    Progress Note Due Date:7/14/2024  Recertification Due Date:8/14/2024    Signature:  Sosa Membreno CCC-SLP  Electronically signed on 6/21/2024          [facility] [place] [address]  [phone]

## 2024-06-26 ENCOUNTER — TREATMENT (OUTPATIENT)
Dept: PHYSICAL THERAPY | Facility: CLINIC | Age: 74
End: 2024-06-26
Payer: MEDICARE

## 2024-06-26 DIAGNOSIS — R25.2 TRISMUS: ICD-10-CM

## 2024-06-26 DIAGNOSIS — R13.12 OROPHARYNGEAL DYSPHAGIA: Primary | ICD-10-CM

## 2024-06-26 NOTE — PROGRESS NOTES
Speech Language Pathology Treatment Note  [address] [place] [address]    Patient: Fortino Dennis                                                                                     Visit Date: 2024  :     1950    Referring practitioner:    Alli Card Jr*  Date of Initial Visit:          Type: THERAPY  Noted: 2024    Patient seen for 9 sessions    Visit Diagnoses:    ICD-10-CM ICD-9-CM   1. Oropharyngeal dysphagia  R13.12 787.22   2. Trismus  R25.2 781.0     SUBJECTIVE     Patient arrived ready for therapy today. He reports no changes since last visit.      OBJECTIVE   GOALS  Goals                                            STG  Comments Status   Patient will improve oral skills to enhance safety and increase eating efficiency and bolus control as measured by increased lip closure, decreased anterior loss of bolus, improved bolus formation, improved posterior propulsion of the bolus, decreased drooling, and increased jaw ROM with decreased jaw pain . Not addressed today. Previous: Patient demonstrated tongue exercises with minimal cues.  Ongoing   Patient will improve hyolaryngeal elevation, improve epiglottic inversion, improve laryngeal closure, increase base of the tongue strength and posterior pharyngeal wall excursion, and increase efficiency of cough to clear residue from the airway as measured by decreased overt signs and symptoms of aspiration and decreased penetration and aspiration on repeat instrumental swallow study, if applicable.  Completed swallow exercise with NMES (estim). Patient able to complete 30 min of estim at A2 (fibrotic) at level 22 today. Ongoing   Patient will report decreased difficulty with swallowing and improved EAT-10 score. Initial eat 10 score 14, Mild. No changes reported today.   Ongoing   Patient will improve oral hygiene to enhance safety and decrease risk of aspiration  pneumonia Not addressed today. Previous: ROM scale opening at evaluation on 5/17/24 was 24mm. Previous March 2023 was 29mm, 5mm reduction in ROM.  Ongoing   LTG        Patient will safely consume PO diet of 0 - Thin drinks and 4 - Puree foods to maintain nutrition/hydration without complications such as aspiration or pneumonia.  Patient is currently on PO diet. He c/o difficulty with coughing, extra effort, and difficulty eating around others. He was encouraged to complete his exercises at home.  Ongoing       Therapy Education/Self Care    Details: POC   Given Home exercises and education.    Progress: Reinforced   Education provided to:  Patient   Level of understanding Verbalized             Total Time of Visit:             45   mins         ASSESSMENT/PLAN     ASSESSMENT: Patient able to complete swallowing with estim for 30 min at A2 (fibrotic) at level 22. [facility] has been in contact with patient regarding ordering orastretch for his trismus. Awaiting Rx from ENT.     PLAN: Continue therapy and home exercises.    Progress Note Due Date:7/14/2024  Recertification Due Date: 8/14/2024    Signature:  Sabiha Sánchez, Speech Therapy Student  Electronically signed on 6/26/2024    The clinical instructor and/or supervising staff, Sosa Membreno CCC-SLP, was present in clinic guiding the visit by approving, concurring, and confirming the skilled judgement for all services rendered.    Signature:  Sosa Membreno CCC-SLP, [place] License #: [id number]  Electronically signed on 6/26/2024          [facility] [place] [address]  [phone]

## 2024-06-28 ENCOUNTER — TREATMENT (OUTPATIENT)
Dept: PHYSICAL THERAPY | Facility: CLINIC | Age: 74
End: 2024-06-28
Payer: MEDICARE

## 2024-06-28 DIAGNOSIS — R13.12 OROPHARYNGEAL DYSPHAGIA: Primary | ICD-10-CM

## 2024-06-28 DIAGNOSIS — R25.2 TRISMUS: ICD-10-CM

## 2024-06-28 NOTE — PROGRESS NOTES
Speech Language Pathology Treatment Note  [address] [place] [address]    Patient: Fortino Dennis                                                                                     Visit Date: 2024  :     1950    Referring practitioner:    Alli Card Jr*  Date of Initial Visit:          Type: THERAPY  Noted: 2024    Patient seen for 10 sessions    Visit Diagnoses:    ICD-10-CM ICD-9-CM   1. Oropharyngeal dysphagia  R13.12 787.22   2. Trismus  R25.2 781.0     SUBJECTIVE     Patient arrived ready for therapy today. He reports no significant changes since last visit.      OBJECTIVE   GOALS  Goals                                            STG  Comments Status   Patient will improve oral skills to enhance safety and increase eating efficiency and bolus control as measured by increased lip closure, decreased anterior loss of bolus, improved bolus formation, improved posterior propulsion of the bolus, decreased drooling, and increased jaw ROM with decreased jaw pain . Not addressed today. Previous: Patient demonstrated tongue exercises with minimal cues.  Ongoing   Patient will improve hyolaryngeal elevation, improve epiglottic inversion, improve laryngeal closure, increase base of the tongue strength and posterior pharyngeal wall excursion, and increase efficiency of cough to clear residue from the airway as measured by decreased overt signs and symptoms of aspiration and decreased penetration and aspiration on repeat instrumental swallow study, if applicable.  Completed swallow exercise with NMES (estim). Patient able to complete 30 min of estim at A2 (fibrotic) at level 22 today. Patient reports that he feels better right after completing the NMES (estim) therapy. Ongoing   Patient will report decreased difficulty with swallowing and improved EAT-10 score. Initial eat 10 score 14, Mild. No changes reported today.    Ongoing   Patient will improve oral hygiene to enhance safety and decrease risk of aspiration pneumonia Not addressed today.     Previous: ROM scale opening at evaluation on 5/17/24 was 24mm. Previous March 2023 was 29mm, 5mm reduction in ROM.  Ongoing   LTG        Patient will safely consume PO diet of 0 - Thin drinks and 4 - Puree foods to maintain nutrition/hydration without complications such as aspiration or pneumonia.  Patient is currently on PO diet. He c/o difficulty with coughing, extra effort, and difficulty eating around others. He was encouraged to complete his exercises at home.  Ongoing       Therapy Education/Self Care    Details: POC   Given Home exercises and education.    Progress: Reinforced   Education provided to:  Patient   Level of understanding Verbalized             Total Time of Visit:             45   mins         ASSESSMENT/PLAN     ASSESSMENT: Patient able to complete swallowing with estim for 30 min at A2 (fibrotic) at level 22. Patient reports that he has no notice any significant changes in his swallow but does seem to feel better right after he leaves treatment.    PLAN: Continue therapy and home exercises.    Progress Note Due Date:7/14/2024  Recertification Due Date: 8/14/2024    Signature:  Sabiha Sánchez, Speech Therapy Student  Electronically signed on 6/28/2024    The clinical instructor and/or supervising staff, Sosa Membreno CCC-SLP, was present in clinic guiding the visit by approving, concurring, and confirming the skilled judgement for all services rendered.    Signature:  Sosa Membreno CCC-SLP, [place] License #: [id number]  Electronically signed on 6/28/2024          [address] [place] [address]. [address]  [phone]

## 2024-07-02 ENCOUNTER — TELEPHONE (OUTPATIENT)
Dept: OTOLARYNGOLOGY | Facility: CLINIC | Age: 74
End: 2024-07-02
Payer: MEDICARE

## 2024-07-02 DIAGNOSIS — R13.12 OROPHARYNGEAL DYSPHAGIA: Primary | ICD-10-CM

## 2024-07-02 NOTE — TELEPHONE ENCOUNTER
Caller: ALKA    Relationship:  PHYSICAL THERAPY    Best call back number: [phone]    What is the medical concern/diagnosis: LYMPHADEMA    What specialty or service is being requested: PHYSICAL THERAPY    What is the provider, practice or medical service name:  REHABILITATION    [facility]    What is the office phone number: [phone]    Any additional details: HIS APPT IS FRIDAY 07.12.2024. ENTER REFERRAL IN [facility] OR FAX:[phone]

## 2024-07-03 ENCOUNTER — TREATMENT (OUTPATIENT)
Dept: PHYSICAL THERAPY | Facility: CLINIC | Age: 74
End: 2024-07-03
Payer: MEDICARE

## 2024-07-03 DIAGNOSIS — R13.12 OROPHARYNGEAL DYSPHAGIA: Primary | ICD-10-CM

## 2024-07-03 DIAGNOSIS — R25.2 TRISMUS: ICD-10-CM

## 2024-07-03 NOTE — PROGRESS NOTES
Speech Language Pathology Treatment Note  [address] [place] [address]    Patient: Fortino Dennis                                                                                     Visit Date: 7/3/2024  :     1950    Referring practitioner:    Alli Card Jr*  Date of Initial Visit:          Type: THERAPY  Noted: 2024    Patient seen for 11 sessions    Visit Diagnoses:    ICD-10-CM ICD-9-CM   1. Oropharyngeal dysphagia  R13.12 787.22   2. Trismus  R25.2 781.0     SUBJECTIVE     Patient arrived ready for therapy today. He reports no changes since last visit.      OBJECTIVE   GOALS  Goals                                            STG  Comments Status   Patient will improve oral skills to enhance safety and increase eating efficiency and bolus control as measured by increased lip closure, decreased anterior loss of bolus, improved bolus formation, improved posterior propulsion of the bolus, decreased drooling, and increased jaw ROM with decreased jaw pain . Not addressed today. Previous: Patient demonstrated tongue exercises with minimal cues.  Ongoing   Patient will improve hyolaryngeal elevation, improve epiglottic inversion, improve laryngeal closure, increase base of the tongue strength and posterior pharyngeal wall excursion, and increase efficiency of cough to clear residue from the airway as measured by decreased overt signs and symptoms of aspiration and decreased penetration and aspiration on repeat instrumental swallow study, if applicable.  Completed swallow exercise with NMES (estim). Patient able to complete swallows for 30 min of estim at A2 (fibrotic) at level 22 today.  Ongoing   Patient will report decreased difficulty with swallowing and improved EAT-10 score. Initial eat 10 score 14, Mild. No changes reported today.   Ongoing   Patient will improve oral hygiene to enhance safety and decrease risk of  aspiration pneumonia Not addressed today.     Previous: ROM scale opening at evaluation on 5/17/24 was 24mm. Previous March 2023 was 29mm, 5mm reduction in ROM.  Ongoing   LTG        Patient will safely consume PO diet of 0 - Thin drinks and 4 - Puree foods to maintain nutrition/hydration without complications such as aspiration or pneumonia.  Patient is currently on PO diet. He c/o difficulty with coughing, extra effort, and difficulty eating around others.  Ongoing       Therapy Education/Self Care    Details: POC   Given Home exercises and education.    Progress: Reinforced   Education provided to:  Patient   Level of understanding Verbalized             Total Time of Visit:             45   mins         ASSESSMENT/PLAN     ASSESSMENT: Patient able to complete swallowing with estim for 30 min at A2 (fibrotic) at level 21.5. Patient reports no changes since last visit.    PLAN: Continue therapy and home exercises.    Progress Note Due Date:7/14/2024  Recertification Due Date: 8/14/2024    Signature:  Sabiha Sánchez, Speech Therapy Student  Electronically signed on 7/3/2024    The clinical instructor and/or supervising staff, Sosa Membreno CCC-SLP, was present in clinic guiding the visit by approving, concurring, and confirming the skilled judgement for all services rendered.    Signature:  Sosa Membreno CCC-SLP, [place] License #: [id number]  Electronically signed on 7/3/2024          [place] [address]  [phone]

## 2024-07-10 ENCOUNTER — TREATMENT (OUTPATIENT)
Dept: PHYSICAL THERAPY | Facility: CLINIC | Age: 74
End: 2024-07-10
Payer: MEDICARE

## 2024-07-10 DIAGNOSIS — R25.2 TRISMUS: ICD-10-CM

## 2024-07-10 DIAGNOSIS — R13.12 OROPHARYNGEAL DYSPHAGIA: Primary | ICD-10-CM

## 2024-07-10 NOTE — PROGRESS NOTES
Speech Language Pathology Treatment Note  [address] [place] [address]    Patient: Fortino Dennis                                                                                     Visit Date: 7/10/2024  :     1950    Referring practitioner:    Alli Card Jr*  Date of Initial Visit:          Type: THERAPY  Noted: 2024    Patient seen for 12 sessions    Visit Diagnoses:    ICD-10-CM ICD-9-CM   1. Oropharyngeal dysphagia  R13.12 787.22   2. Trismus  R25.2 781.0     SUBJECTIVE     Patient arrived alert fany for therapy today. He reports no changes since last visit.      OBJECTIVE   GOALS  Goals                                            STG  Comments Status   Patient will improve oral skills to enhance safety and increase eating efficiency and bolus control as measured by increased lip closure, decreased anterior loss of bolus, improved bolus formation, improved posterior propulsion of the bolus, decreased drooling, and increased jaw ROM with decreased jaw pain . Not addressed today. Previous: Patient demonstrated tongue exercises with minimal cues.  Ongoing   Patient will improve hyolaryngeal elevation, improve epiglottic inversion, improve laryngeal closure, increase base of the tongue strength and posterior pharyngeal wall excursion, and increase efficiency of cough to clear residue from the airway as measured by decreased overt signs and symptoms of aspiration and decreased penetration and aspiration on repeat instrumental swallow study, if applicable.  Completed swallow exercise with NMES (estim). Patient able to complete swallows for 30 min of estim at A2 (fibrotic) at level 22 today.  Ongoing   Patient will report decreased difficulty with swallowing and improved EAT-10 score. Initial eat 10 score 14, Mild. No changes reported today.   Ongoing   Patient will improve oral hygiene to enhance safety and decrease  risk of aspiration pneumonia Not addressed today. Patient reports no changes since last session.    Previous: ROM scale opening at evaluation on 5/17/24 was 24mm. Previous March 2023 was 29mm, 5mm reduction in ROM.  Ongoing   LTG        Patient will safely consume PO diet of 0 - Thin drinks and 4 - Puree foods to maintain nutrition/hydration without complications such as aspiration or pneumonia.  Patient is currently on PO diet. He c/o difficulty with coughing, extra effort, and difficulty eating around others.  Ongoing       Therapy Education/Self Care    Details: POC   Given Home exercises and education.    Progress: Reinforced   Education provided to:  Patient   Level of understanding Verbalized             Total Time of Visit:             45   mins         ASSESSMENT/PLAN     ASSESSMENT: Patient reports no changes since last visit. He was able to complete swallowing with estim for 30 min at A2 (fibrotic) at level 22. Patient reports no changes since last visit.    PLAN: Continue therapy and home exercises.    Progress Note Due Date:7/14/2024  Recertification Due Date: 8/14/2024    Signature:  Sabiha Sánchez, Speech Therapy Student  Electronically signed on 7/10/2024    The clinical instructor and/or supervising staff, Sosa Membreno CCC-SLP, was present in clinic guiding the visit by approving, concurring, and confirming the skilled judgement for all services rendered.    Signature:  Sosa Membreno CCC-SLP, [place] License #: [id number]  Electronically signed on 7/10/2024          [place] [address]  [phone]

## 2024-07-11 DIAGNOSIS — I89.0 LYMPHEDEMA: Primary | ICD-10-CM

## 2024-07-12 ENCOUNTER — TREATMENT (OUTPATIENT)
Dept: PHYSICAL THERAPY | Facility: CLINIC | Age: 74
End: 2024-07-12
Payer: MEDICARE

## 2024-07-12 DIAGNOSIS — R25.2 TRISMUS: ICD-10-CM

## 2024-07-12 DIAGNOSIS — R13.12 OROPHARYNGEAL DYSPHAGIA: Primary | ICD-10-CM

## 2024-07-12 DIAGNOSIS — I89.0 LYMPHEDEMA: Primary | ICD-10-CM

## 2024-07-12 NOTE — PROGRESS NOTES
Speech Language Pathology Treatment Note  [address] [place] [address]    Patient: Fortino Dennis                                                                                     Visit Date: 2024  :     1950    Referring practitioner:    Alli Card Jr*  Date of Initial Visit:          Type: THERAPY  Noted: 2024    Patient seen for 13 sessions    Visit Diagnoses:    ICD-10-CM ICD-9-CM   1. Oropharyngeal dysphagia  R13.12 787.22   2. Trismus  R25.2 781.0     SUBJECTIVE     Patient arrived alert and ready for therapy. He had Lymphedema evaluation prior to ST today.       OBJECTIVE   GOALS  Goals                                            STG  Comments Status   Patient will improve oral skills to enhance safety and increase eating efficiency and bolus control as measured by increased lip closure, decreased anterior loss of bolus, improved bolus formation, improved posterior propulsion of the bolus, decreased drooling, and increased jaw ROM with decreased jaw pain . Not addressed today.     Previous: Patient demonstrated tongue exercises with minimal cues.  Ongoing   Patient will improve hyolaryngeal elevation, improve epiglottic inversion, improve laryngeal closure, increase base of the tongue strength and posterior pharyngeal wall excursion, and increase efficiency of cough to clear residue from the airway as measured by decreased overt signs and symptoms of aspiration and decreased penetration and aspiration on repeat instrumental swallow study, if applicable.  Completed swallow exercise with NMES (estim). Patient able to complete swallows for 30 min of estim at A2 (fibrotic) at level 23 today. Patient reports less feeling of stimulation therefore level was increased to 23. Ongoing   Patient will report decreased difficulty with swallowing and improved EAT-10 score. Initial eat 10 score 14, Mild. No changes  reported today.   Ongoing   Patient will improve oral hygiene to enhance safety and decrease risk of aspiration pneumonia Not addressed today. Patient reports no changes since last session.    Previous: ROM scale opening at evaluation on 5/17/24 was 24mm. Previous March 2023 was 29mm, 5mm reduction in ROM.  Ongoing   LTG        Patient will safely consume PO diet of 0 - Thin drinks and 4 - Puree foods to maintain nutrition/hydration without complications such as aspiration or pneumonia.  Patient is currently on PO diet. He c/o difficulty with coughing, extra effort, and difficulty eating around others.  Ongoing       Therapy Education/Self Care    Details: POC   Given Home exercises and education.    Progress: Reinforced   Education provided to:  Patient   Level of understanding Verbalized             Total Time of Visit:             45   mins         ASSESSMENT/PLAN     ASSESSMENT:  Patient was able to complete swallowing with estim for 30 min at A2 (fibrotic) at level 23 today. Level was increased due to reduced feeling of stimulation. Patient had lymphedema therapy prior to ST today and reports no changes since last visit.    PLAN: Continue therapy and home exercises.    Progress Note Due Date:7/14/2024  Recertification Due Date: 8/14/2024    Signature:  Sabiha Sánchez, Speech Therapy Student  Electronically signed on 7/12/2024    The clinical instructor and/or supervising staff, Sosa Membreno CCC-SLP, was present in clinic guiding the visit by approving, concurring, and confirming the skilled judgement for all services rendered.    Signature:  Sosa Membreno CCC-SLP, [place] License #: [id number]  Electronically signed on 7/12/2024          [address] [place] [address] [place] [address]  [phone]

## 2024-07-12 NOTE — PROGRESS NOTES
Physical Therapy Initial Evaluation and Plan of Care  [address] [place] [address]    Patient: Fortino Dennis             : 1950  Today's Date: 2024  Referring practitioner: Alli Prakash  Date of Initial Visit: 2024  Patient seen for 1 sessions    Visit Diagnoses:    ICD-10-CM ICD-9-CM   1. 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    Sleep apnea     Throat cancer     Thyroid disorder     Tinnitus     TMJ dysfunction  wear      Past Surgical History:   Procedure Laterality Date    CATARACT EXTRACTION WITH INTRAOCULAR LENS IMPLANT Bilateral     pt states x3 surgeries    LARYNGOSCOPY Bilateral 2023    Procedure: MICRODIRECT LARYNGOSCOPY;  Surgeon: Alli Card Jr., MD;  Location: [facility] OR;  Service: ENT;  Laterality: Bilateral;    MANDIBLE SURGERY      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  Date of surgery: 2021  Mechanism of injury: He lives with his brother. He admits to  not being as active as he could be. He isn't comfortable lying with his head back on table even with the head quite elevated.     Subjective comment: After the jaw surgery is when the swelling got worse.Quality of life: good    Pain  Current pain ratin            OBJECTIVE     Objective    Lymphedema       Row Name 24 1000             Subjective Pain    Able to rate subjective pain? yes  -HR      Pre-Treatment Pain Level 0  -HR         Lymphedema Assessment    Lymphedema Classification Neck:;Face:;secondary;stage 2 (Spontaneously Irreversible)  -HR      Lymphedema Cancer Related Sx --  neck lymph node dissection . Osteonecrosis of L mandible with free flap reconstruction in . Both in [facility]  -HR      Lymphedema Surgery Comments the jaw reconstruction triggered most of the swelling he has now according to patient.  -HR      Cancer Comments Unknown nodes removed with original cancer tx in .  -HR      Chemo Received yes  -HR      Chemo Treatments #/Timeframe   -HR      Radiation Therapy Received yes  -HR      Radiation Treatments #/Timeframe   -HR      Adverse Radiation Reactions/Complication osteonecrosis of L mandible  -HR      Infections or Cellulitis? no  -HR         General ROM    Head/Neck/Trunk Neck Extension;Neck Lt Lateral Flexion;Neck Rt Lateral Flexion;Neck Lt Rotation;Neck Rt Rotation  -HR      LT Upper Ext Lt Shoulder ABduction;Lt Shoulder Flexion  -HR         Head/Neck/Trunk    Neck Extension AROM restricted due to adhesions and fibrosis  -HR      Neck Lt Lateral Flexion AROM restricted  -HR      Neck Rt Lateral Flexion AROM restricted  -HR      Neck Lt Rotation AROM restricted  -HR      Neck Rt Rotation AROM restricted  -HR      Head/Neck/Trunk Comments more tightness felt along L side of neck and anterior neck is very taut.  -HR         Left Upper Ext    Lt Shoulder Abduction AROM 75%  -HR      Lt Shoulder Flexion AROM 75%  -HR      Lt Upper Extremity Comments  weakness  after surgery-possible nerve injury  -HR         Skin Changes/Observations    Location/Assessment Head/Neck  -HR      Head/Neck Conditions friable;clean  -HR      Head/Neck Color/Pigment radiation fibrosis;fibrosis  -HR      Head/Neck Skin Details skin has limited mobility along the entire neck. Scar line across entire anterior neck with adhesions causing tissue crease. Tendons of SCM taut and fibrotic.  -HR         Lymphedema Measurements    Measurement Type(s) Circumferential  -HR      Circumferential Areas Head;Neck  -HR         Head Circumferential (cm)    Measurement Location 1 upper lip  -HR      Head 1 46.5 cm  -HR      Measurement Location 2 lower lip  -HR      Head 2 44.9 cm  -HR      Head Circumferential Total 91.4 cm  -HR         Neck Circumferential (cm)    Measurement Location 1 vinod's apple  -HR      Neck 1 33.7 cm  -HR      Neck Circumferential Total 33.7 cm  -HR                User Key  (r) = Recorded By, (t) = Taken By, (c) = Cosigned By      Initials Name Provider Type    HR Ellen Rosenbaum, PT, DPT, CLT-JULIA Physical Therapist                        Therapy Education/Self Care [id number]   Education offered today POC   [employer] Code    Ongoing HEP   Head and Neck Lymphedema HEP   Timed Minutes 10       Total Timed Treatment:     10   mins  Total Time of Visit:            45   mins    ASSESSMENT/PLAN     Goals                                          Progress Note due by 8/11/24                                                      Recert due by 10/9/24   STG by: 4 weeks Comments Date Status   Patient will have a good basic understanding of lymphedema and its suggested risk reduction practices      Patient will be independent with remedial HEP for lymphedema      Patient will be independent with self MLD for lymphedema            LTG by: 8 Weeks      Patient will have appropriate compression garments for lymphedema maintenance      Patient will have no sign or symptoms of infection      Patient  will be independent with comprehensive maintenance program for lymphedema                          Assessment & Plan       Assessment  Impairments: abnormal or restricted ROM and lacks appropriate home exercise program   Assessment details: Mr. Dennis is having issues with lymphedema of the head and neck due to previous treatments for cancer. He has restricted cervical mobility and swelling in the neck and face, worse on the L due to mandible reconstruction. He would benefit from a course of CDT to try to improve this condition.   Prognosis: good    Plan  Therapy options: will be seen for skilled therapy services  Planned therapy interventions: manual therapy, compression, therapeutic activities and home exercise program  Frequency: 2x week  Duration in weeks: 12  Treatment plan discussed with: patient          SIGNATURE: Ellen Rosenbaum, PT, DPT, CLARENCE FARR License #: [id number]  Electronically Signed on 7/12/2024    Initial Certification  Certification Period: 7/12/2024 through 10/9/2024  I certify that the therapy services are furnished while this patient is under my care.  The services outlined above are required by this patient, and will be reviewed every 90 days.     PHYSICIAN: Alli Card Jr., MD (NPI: [id number])    Signature:_________________________________________DATE: ________      Please sign and return via fax to [phone].   Thank you so much for letting us work with Fortino. I appreciate your letting us work with your patients. If you have any questions or concerns, please don't hesitate to contact me.          [address] [address]  [phone]

## 2024-07-16 ENCOUNTER — TELEPHONE (OUTPATIENT)
Dept: OTOLARYNGOLOGY | Facility: CLINIC | Age: 74
End: 2024-07-16
Payer: MEDICARE

## 2024-07-16 NOTE — TELEPHONE ENCOUNTER
I spoke to Sera at [facility]. She stated they did not get a fax from us with his orders from Dr Card. Advised we have a fax confirmation in the chart. She gave me another number to fax it to. I have faxed over orders to this number

## 2024-07-17 ENCOUNTER — TREATMENT (OUTPATIENT)
Dept: PHYSICAL THERAPY | Facility: CLINIC | Age: 74
End: 2024-07-17
Payer: MEDICARE

## 2024-07-17 DIAGNOSIS — R13.12 OROPHARYNGEAL DYSPHAGIA: Primary | ICD-10-CM

## 2024-07-17 DIAGNOSIS — R25.2 TRISMUS: ICD-10-CM

## 2024-07-17 NOTE — PROGRESS NOTES
Speech Language Pathology Treatment Note  [address] [place] [address]    Patient: Fortino Dennis                                                                                     Visit Date: 2024  :     1950    Referring practitioner:    Alli Card Jr*  Date of Initial Visit:          Type: THERAPY  Noted: 2024    Patient seen for 14 sessions    Visit Diagnoses:    ICD-10-CM ICD-9-CM   1. Oropharyngeal dysphagia  R13.12 787.22   2. Trismus  R25.2 781.0     SUBJECTIVE     Patient arrived alert and ready for therapy.      OBJECTIVE   GOALS  Goals                                            STG  Comments Status   Patient will improve oral skills to enhance safety and increase eating efficiency and bolus control as measured by increased lip closure, decreased anterior loss of bolus, improved bolus formation, improved posterior propulsion of the bolus, decreased drooling, and increased jaw ROM with decreased jaw pain . Not addressed today.     Previous: Patient demonstrated tongue exercises with minimal cues.  Ongoing   Patient will improve hyolaryngeal elevation, improve epiglottic inversion, improve laryngeal closure, increase base of the tongue strength and posterior pharyngeal wall excursion, and increase efficiency of cough to clear residue from the airway as measured by decreased overt signs and symptoms of aspiration and decreased penetration and aspiration on repeat instrumental swallow study, if applicable.  Completed swallow exercise with NMES (estim). Patient able to complete swallows for 30 min of estim at A2 (fibrotic) at level 23 again today.  Ongoing   Patient will report decreased difficulty with swallowing and improved EAT-10 score. Initial eat 10 score 14, Mild. No changes reported today.   Ongoing   Patient will improve oral hygiene to enhance safety and decrease risk of aspiration pneumonia Not  addressed today. Patient reports no changes since last session.    Previous: ROM scale opening at evaluation on 5/17/24 was 24mm. Previous March 2023 was 29mm, 5mm reduction in ROM.  Ongoing   LTG        Patient will safely consume PO diet of 0 - Thin drinks and 4 - Puree foods to maintain nutrition/hydration without complications such as aspiration or pneumonia.  Patient is currently on PO diet. He c/o difficulty with coughing, extra effort, and difficulty eating around others.  Ongoing       Therapy Education/Self Care    Details: POC   Given Home exercises and education.    Progress: Reinforced   Education provided to:  Patient   Level of understanding Verbalized             Total Time of Visit:             45   mins         ASSESSMENT/PLAN     ASSESSMENT:  Patient was able to complete swallowing with estim for 30 min at A2 (fibrotic) at level 23 today. Patient has not pursued DME request for orastretch press from [facility] yet.     PLAN: Continue therapy and home exercises.    Progress Note Due Date:7/14/2024  Recertification Due Date: 8/14/2024    Signature:  Sabiha Sánchez, Speech Therapy Student  Electronically signed on 7/17/2024    The clinical instructor and/or supervising staff, Sosa Membreno CCC-SLP, was present in clinic guiding the visit by approving, concurring, and confirming the skilled judgement for all services rendered.    Signature:  Sosa Membreno CCC-SLP, [place] License #: [id number]  Electronically signed on 7/17/2024          [address] [place] [address] [place] [address]  [phone]

## 2024-07-19 ENCOUNTER — TREATMENT (OUTPATIENT)
Dept: PHYSICAL THERAPY | Facility: CLINIC | Age: 74
End: 2024-07-19
Payer: MEDICARE

## 2024-07-19 DIAGNOSIS — R13.12 OROPHARYNGEAL DYSPHAGIA: Primary | ICD-10-CM

## 2024-07-19 DIAGNOSIS — R25.2 TRISMUS: ICD-10-CM

## 2024-07-19 PROCEDURE — 92526 ORAL FUNCTION THERAPY: CPT | Performed by: SPEECH-LANGUAGE PATHOLOGIST

## 2024-07-22 NOTE — PROGRESS NOTES
Speech Language Pathology Treatment Note  [address] [place] [address]    Patient: Fortino Dennis                                                                                     Visit Date: 2024  :     1950    Referring practitioner:    Alli Card Jr*  Date of Initial Visit:          Type: THERAPY  Noted: 2024    Patient seen for 15 sessions    Visit Diagnoses:    ICD-10-CM ICD-9-CM   1. Oropharyngeal dysphagia  R13.12 787.22   2. Trismus  R25.2 781.0     SUBJECTIVE     Patient arrived alert and ready for therapy.    OBJECTIVE   GOALS  Goals                                            STG  Comments Status   Patient will improve oral skills to enhance safety and increase eating efficiency and bolus control as measured by increased lip closure, decreased anterior loss of bolus, improved bolus formation, improved posterior propulsion of the bolus, decreased drooling, and increased jaw ROM with decreased jaw pain . Not addressed today.     Previous: Patient demonstrated tongue exercises with minimal cues.  Ongoing   Patient will improve hyolaryngeal elevation, improve epiglottic inversion, improve laryngeal closure, increase base of the tongue strength and posterior pharyngeal wall excursion, and increase efficiency of cough to clear residue from the airway as measured by decreased overt signs and symptoms of aspiration and decreased penetration and aspiration on repeat instrumental swallow study, if applicable.  Completed swallow exercise with NMES (estim). Patient able to complete swallows for 30 min of estim at A2 (fibrotic) at level 23 again today. Patient began feeling stimulation earlier today. Ongoing   Patient will report decreased difficulty with swallowing and improved EAT-10 score. Initial eat 10 score 14, Mild. Patient reports no changes since last session.  Ongoing   Patient will improve oral hygiene to  enhance safety and decrease risk of aspiration pneumonia Not addressed today. Patient reports no changes since last session.    Previous: ROM scale opening at evaluation on 5/17/24 was 24mm. Previous March 2023 was 29mm, 5mm reduction in ROM.  Ongoing   LTG        Patient will safely consume PO diet of 0 - Thin drinks and 4 - Puree foods to maintain nutrition/hydration without complications such as aspiration or pneumonia.  Patient is currently on PO diet. He c/o difficulty with coughing, extra effort, and difficulty eating around others.  Ongoing       Therapy Education/Self Care    Details: POC   Given Home exercises and education.    Progress: Reinforced   Education provided to:  Patient   Level of understanding Verbalized             Total Time of Visit:             45   mins         ASSESSMENT/PLAN     ASSESSMENT:  Patient was able to complete swallowing with estim for 30 min at A2 (fibrotic) at level 23 today. He reports no changes since last visit.    PLAN: Continue therapy and home exercises.    Progress Note Due Date:7/14/2024  Recertification Due Date: 8/14/2024    Signature:  Sabiha Sánchez, Speech Therapy Student  Electronically signed on 7/22/2024    The clinical instructor and/or supervising staff, Sosa Membreno CCC-SLP, was present in clinic guiding the visit by approving, concurring, and confirming the skilled judgement for all services rendered.    Signature:  Sosa Membreno CCC-SLP, [place] License #: [id number]  Electronically signed on 7/22/2024          [address] [place] [address] [place] [address]  [phone]

## 2024-07-24 ENCOUNTER — TREATMENT (OUTPATIENT)
Dept: PHYSICAL THERAPY | Facility: CLINIC | Age: 74
End: 2024-07-24
Payer: MEDICARE

## 2024-07-24 DIAGNOSIS — R13.12 OROPHARYNGEAL DYSPHAGIA: Primary | ICD-10-CM

## 2024-07-24 DIAGNOSIS — R25.2 TRISMUS: ICD-10-CM

## 2024-07-24 NOTE — PROGRESS NOTES
Speech Language Pathology Treatment Note  [address] [place] [address]    Patient: Fortino Dennis                                                                                     Visit Date: 2024  :     1950    Referring practitioner:    Alli Card Jr*  Date of Initial Visit:          Type: THERAPY  Noted: 2024    Patient seen for 16 sessions    Visit Diagnoses:    ICD-10-CM ICD-9-CM   1. Oropharyngeal dysphagia  R13.12 787.22   2. Trismus  R25.2 781.0     SUBJECTIVE     Patient arrived alert and ready for therapy.    OBJECTIVE   GOALS  Goals                                            STG  Comments Status   Patient will improve oral skills to enhance safety and increase eating efficiency and bolus control as measured by increased lip closure, decreased anterior loss of bolus, improved bolus formation, improved posterior propulsion of the bolus, decreased drooling, and increased jaw ROM with decreased jaw pain . Not addressed today.     Previous: Patient demonstrated tongue exercises with minimal cues.  Ongoing   Patient will improve hyolaryngeal elevation, improve epiglottic inversion, improve laryngeal closure, increase base of the tongue strength and posterior pharyngeal wall excursion, and increase efficiency of cough to clear residue from the airway as measured by decreased overt signs and symptoms of aspiration and decreased penetration and aspiration on repeat instrumental swallow study, if applicable.  Completed swallow exercise with NMES (estim). Patient able to complete swallows for 30 min of estim at A2 (fibrotic) at level 23.5 today. He stated that he had a hard time feeling any  Ongoing   Patient will report decreased difficulty with swallowing and improved EAT-10 score. Initial eat 10 score 14, Mild. Patient reports no changes since last session.  Ongoing   Patient will improve oral hygiene to  enhance safety and decrease risk of aspiration pneumonia Patient has been in contact with [employer]. Patient reports no changes since last session.    Previous: ROM scale opening at evaluation on 5/17/24 was 24mm. Previous March 2023 was 29mm, 5mm reduction in ROM.  Ongoing   LTG        Patient will safely consume PO diet of 0 - Thin drinks and 4 - Puree foods to maintain nutrition/hydration without complications such as aspiration or pneumonia.  Patient is currently on PO diet. He c/o difficulty with coughing, extra effort, and difficulty eating around others.  Ongoing       Therapy Education/Self Care    Details: POC   Given Home exercises and education.    Progress: Reinforced   Education provided to:  Patient   Level of understanding Verbalized             Total Time of Visit:             45   mins         ASSESSMENT/PLAN     ASSESSMENT:  Patient was able to complete swallowing with estim for 30 min at A2 (fibrotic) at level 23.5 today. He reports no changes since last visit and is in contact with [employer].    PLAN: Continue therapy and home exercises.    Progress Note Due Date:7/14/2024  Recertification Due Date: 8/14/2024    Signature:  Sabiha Sánchez, Speech Therapy Student  Electronically signed on 7/24/2024    The clinical instructor and/or supervising staff, Sosa Membreno CCC-SLP, was present in clinic guiding the visit by approving, concurring, and confirming the skilled judgement for all services rendered.    Signature:  Sosa Membreno CCC-SLP, [place] License #: [id number]  Electronically signed on 7/24/2024          [facility] [place] [address]  [phone]

## 2024-07-26 ENCOUNTER — TREATMENT (OUTPATIENT)
Dept: PHYSICAL THERAPY | Facility: CLINIC | Age: 74
End: 2024-07-26
Payer: MEDICARE

## 2024-07-26 DIAGNOSIS — R25.2 TRISMUS: ICD-10-CM

## 2024-07-26 DIAGNOSIS — R13.12 OROPHARYNGEAL DYSPHAGIA: Primary | ICD-10-CM

## 2024-07-26 NOTE — PROGRESS NOTES
Speech Language Pathology Treatment Note  [address] [place] [address]    Patient: Fortino Dennis                                                                                     Visit Date: 2024  :     1950    Referring practitioner:    Alli Card Jr*  Date of Initial Visit:          Type: THERAPY  Noted: 2024    Patient seen for 17 sessions    Visit Diagnoses:    ICD-10-CM ICD-9-CM   1. Oropharyngeal dysphagia  R13.12 787.22   2. Trismus  R25.2 781.0     SUBJECTIVE     Patient arrived alert and ready for therapy. States that he has a headache today.    OBJECTIVE   GOALS  Goals                                            STG  Comments Status   Patient will improve oral skills to enhance safety and increase eating efficiency and bolus control as measured by increased lip closure, decreased anterior loss of bolus, improved bolus formation, improved posterior propulsion of the bolus, decreased drooling, and increased jaw ROM with decreased jaw pain . Not addressed today.    Previous: Patient demonstrated tongue exercises with minimal cues.  Ongoing   Patient will improve hyolaryngeal elevation, improve epiglottic inversion, improve laryngeal closure, increase base of the tongue strength and posterior pharyngeal wall excursion, and increase efficiency of cough to clear residue from the airway as measured by decreased overt signs and symptoms of aspiration and decreased penetration and aspiration on repeat instrumental swallow study, if applicable.  Completed swallow exercise with NMES (estim). Patient able to complete swallows for 30 min of estim at A2 (fibrotic) at level 23.5 again today. He had a hard time feeling stimulation. Ongoing   Patient will report decreased difficulty with swallowing and improved EAT-10 score. Initial eat 10 score 14, Mild. Patient reports no changes since last session.  Ongoing   Patient  will improve oral hygiene to enhance safety and decrease risk of aspiration pneumonia Patient reports no changes since last session.    Previous: ROM scale opening at evaluation on 5/17/24 was 24mm. Previous March 2023 was 29mm, 5mm reduction in ROM.  Ongoing   LTG        Patient will safely consume PO diet of 0 - Thin drinks and 4 - Puree foods to maintain nutrition/hydration without complications such as aspiration or pneumonia.  Patient is currently on PO diet. He c/o difficulty with coughing, extra effort, and difficulty eating around others.  Ongoing       Therapy Education/Self Care    Details: POC   Given Home exercises and education.    Progress: Reinforced   Education provided to:  Patient   Level of understanding Verbalized             Total Time of Visit:             45   mins         ASSESSMENT/PLAN     ASSESSMENT:  Patient was able to complete swallowing with estim for 30 min at A2 (fibrotic) at level 23.5 today. He reports no changes since last visit. He stated that he has not called back to crani[facility]hab regarding  the [employer] order.     PLAN: Continue therapy and home exercises.    Progress Note Due Date:8/14/2024  Recertification Due Date: 8/14/2024    Signature:  Sabiha Sánchez, Speech Therapy Student  Electronically signed on 7/26/2024    The clinical instructor and/or supervising staff, Sosa Membreno CCC-SLP, was present in clinic guiding the visit by approving, concurring, and confirming the skilled judgement for all services rendered.    Signature:  Sosa Membreno CCC-SLP, [place] License #: [id number]  Electronically signed on 7/26/2024          [address] [place] [address]. [id number]  [phone]

## 2024-07-31 ENCOUNTER — OFFICE VISIT (OUTPATIENT)
Dept: OTOLARYNGOLOGY | Facility: CLINIC | Age: 74
End: 2024-07-31
Payer: MEDICARE

## 2024-07-31 VITALS
DIASTOLIC BLOOD PRESSURE: 70 MMHG | TEMPERATURE: 97.8 F | HEART RATE: 62 BPM | HEIGHT: 73 IN | BODY MASS INDEX: 17.79 KG/M2 | WEIGHT: 134.2 LBS | SYSTOLIC BLOOD PRESSURE: 110 MMHG

## 2024-07-31 DIAGNOSIS — R13.12 OROPHARYNGEAL DYSPHAGIA: Primary | ICD-10-CM

## 2024-07-31 DIAGNOSIS — K22.2 STENOSIS OF ESOPHAGUS: ICD-10-CM

## 2024-07-31 DIAGNOSIS — E44.0 MODERATE PROTEIN-CALORIE MALNUTRITION: ICD-10-CM

## 2024-07-31 DIAGNOSIS — K12.33 MUCOSITIS DUE TO RADIATION THERAPY: ICD-10-CM

## 2024-07-31 DIAGNOSIS — R25.2 TRISMUS: ICD-10-CM

## 2024-07-31 DIAGNOSIS — K02.9 CARIES INVOLVING MULTIPLE SURFACES OF TOOTH: ICD-10-CM

## 2024-07-31 DIAGNOSIS — Z85.819 HISTORY OF PHARYNGEAL CANCER: ICD-10-CM

## 2024-07-31 DIAGNOSIS — R13.11 ORAL PHASE DYSPHAGIA: ICD-10-CM

## 2024-07-31 DIAGNOSIS — R47.1 DYSARTHRIA: ICD-10-CM

## 2024-07-31 NOTE — PATIENT INSTRUCTIONS
Refer to Dr Sierra    Dental referral    Eat 4-5 meals daily    Consider G tube placement    Continue SPEECH LANGUAGE PATHOLOGY    Call for problems     CONTACT INFORMATION:  The main office phone number is [phone]. For emergencies after hours and on weekends, this number will convert over to our answering service and the on call provider will answer. Please try to keep non emergent phone calls/ questions to office hours 9am-5pm Monday through Friday.     XING  As an alternative, you can sign up and use the Epic MyChart system for more direct and quicker access for non emergent questions/ problems.  [facility] XING allows you to send messages to your doctor, view your test results, renew your prescriptions, schedule appointments, and more. To sign up, go to Gift Card Combo and click on the Sign Up Now link in the New User? box. Enter your XING Activation Code exactly as it appears below along with the last four digits of your Social Security Number and your Date of Birth () to complete the sign-up process. If you do not sign up before the expiration date, you must request a new code.    XING Activation Code: Activation code not generated  Current XING Status: Active    If you have questions, you can email [email or web address] or call [phone] to talk to our XING staff. Remember, XING is NOT to be used for urgent needs. For medical emergencies, dial 911.

## 2024-07-31 NOTE — PROGRESS NOTES
Alli Card Jr, MD  [facility] EAR [facility]SE & [facility]ROAT  [address], [address]  [facility] [address]  Fax [phone]  Phone [phone]      Visit Type: FOLLOW UP   Chief Complaint   Patient presents with    Difficulty Swallowing           HPI  Accompanied by: Friend  Fortino Dennis is a 73 y.o. male who presents for routine cancer follow up. He says speech helping. Still not able to open well. Not yet received device for mout stretching   He states he feels bad.  Not eating. Has no G tube.  States eating 2 b.ended meals daily.  No dental follow up    Oncology History:  Oncology/Hematology History   Squamous cell carcinoma of pharynx   6/15/2023 Initial Diagnosis    Squamous cell carcinoma of pharynx     10/25/2023 - 10/25/2023 Other Event    ENT surveillance  Patient with no evidence of recurrent disease.  He has severe trismus  The left mandibular free flap appears intact  Neck has banding from prior multiple surgeries and radiation  Patient appears to be cancer free at this time.  He wishes continued surveillance.  Alli Card Jr, MD  10/25/2023  12:59 CDT       4/29/2024 - 4/29/2024 Other Event    ENT oncologic surveillance:  Oncologic surveillance carried out today.  No evidence of recurrence.  Likely given that the patient is greater than 5 years out.  Oral and oropharyngeal dysphagia worsening slowly.  I feel this is due to brawny induration.    .cmnsignme     7/31/2024 - 7/31/2024 Other Event    ENT oncologic surveillance:  ENT oncologic surveillance carried out with no signs of recurrent disease.  Patient has significant trismus and oral as well as oropharyngeal dysphagia.  While the patient is out of the typical recurrent cancer.,  I will continue to follow because of his postradiation and postsurgical changes contributing to his weight loss and oropharyngeal dysfunction.    Alli Card Jr, MD  7/31/2024  12:30 CDT           Cancer  Surveillance:  Cancer site: Pharyngeal wall with neck dissection  Initial staging: T1N0M0  Re-staging: none  Therapy: Surgery, RMND, XRT and Chemo  Completion therapy: 2007      Past Medical History:   Diagnosis Date    Acquired hypothyroidism     Anxiety     Arthritis     Atrial fibrillation     Dental disease     Depression     Essential hypertension     GERD (gastroesophageal reflux disease)     Headache 2010 first noticed    Hiatal hernia     Hyperlipidemia     Hypertension     Memory problem     aricept    Mixed hyperlipidemia     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    Sleep apnea     Throat cancer     Thyroid disorder     Tinnitus 2020    TMJ dysfunction 2020 wear        Past Surgical History:   Procedure Laterality Date    CATARACT EXTRACTION WITH INTRAOCULAR LENS IMPLANT Bilateral     pt states x3 surgeries    LARYNGOSCOPY Bilateral 7/7/2023    Procedure: MICRODIRECT LARYNGOSCOPY;  Surgeon: Alli Card Jr., MD;  Location:  [facility] OR;  Service: ENT;  Laterality: Bilateral;    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    MANDIBLE SURGERY  2021    removal of necrotic jaw (part of 1 of 2 surgeries)    MASTECTOMY Left     removal of breast due to benign growth    PANENDOSCOPY Bilateral 7/7/2023    Procedure: DIRECT LARYNGOSCOPY, ESOPHAGOSCOPY, BRONCHOSCOPY, MICRODIRECT LARYNGOSCOPY,OR ESOPHAGEAL DILATATION 38-48;  Surgeon: Alli Card Jr., MD;  Location: [facility] OR;  Service: ENT;  Laterality: Bilateral;    THROAT SURGERY  2007    cancerous mass removed       Family History: His family history includes Cancer in his mother; Colon cancer in his mother; Dementia in his mother; Hypertension in his mother.     Social History: He  reports that he has never smoked. He has never been exposed to tobacco smoke. He has never used smokeless tobacco. He reports current alcohol use of about 3.0 standard drinks of alcohol per week. He reports that he does not use  drugs.    Home Medications:  Acetaminophen, Senna, amLODIPine, atorvastatin, donepezil, levothyroxine, rivaroxaban, venlafaxine XR, and zolpidem    Allergies:  He has No Known Allergies.       Vital Signs:   Temp:  [97.8 °F (36.6 °C)] 97.8 °F (36.6 °C)  Heart Rate:  [62] 62  BP: (110)/(70) 110/70  ENT Physical Exam  Constitutional  Appearance: patient appears well-developed, cachectic, patient is cooperative;  Communication/Voice: communication appropriate for developmental age; speech dysarthric; Communication comments: NPescape and dysarthria  Constitutional comments: Frail  Head and Face  Appearance: head appears normal, face appears normal and face appears atraumatic;  Palpation: facial palpation normal;  Salivary: glands normal;  Ear  Hearing: intact;  Auricles: bilateral auricles normal;  External Mastoids: right external mastoid normal; left external mastoid normal;  Ear Canals: bilateral ear canals normal;  Tympanic Membranes: bilateral tympanic membranes normal;  Nose  External Nose: nares patent bilaterally; external nose normal;  Internal Nose: nasal mucosa normal; Nasal mucosa comments: R side mid synecia, very thin   nasal septal deviation present; deviation is to the left, septal deviation is severe; Septum comments: R to L mid severe   bilateral inferior turbinates edematous and erythematous;  Oral Cavity/Oropharynx  Lips: normal;  Teeth: missing teeth (L mandible with impnat) noted; dental caries present;  Gums: gingival recession present; Gum comments: gingivits  Tongue: surface is coated and smooth; Oral Tongue comments: very dry  Oral mucosa: mucous membranes dry; buccal mucosa Buccal Mucosa comments: thickened secretions  Hard palate: normal;  Soft palate: normal;  Tonsils: bilateral tonsils 1+, Tonsil comments: dry and coated  OC/OP comments: Trismus-opens to approximately 18 mm at the incisors  Neck  Neck: scars (Left neck dissection scar, well-healed) present; neck asymmetric (Status post left  neck dissection); no neck mass present;  Thyroid: no thyroid mass present;  Neck comments: Left neck, well-healed  Very atrophic musculature of the right neck  Brawny induration left greater than right  Poor laryngeal elevation  Banding of neck muscles  Respiratory  Inspection: breathing unlabored; normal breathing rate;  Cardiovascular  Inspection: extremities are warm and well perfused; no peripheral edema present;  Lymphatic  Palpation: no cervical adenopathy noted;  Neurovestibular  Mental Status: alert and oriented;  Psychiatric: mood normal; affect is appropriate;  Drawings         Laryngoscopy    Date/Time: 7/31/2024 11:15 AM    Performed by: Alli Card Jr., MD  Authorized by: Alli Card Jr., MD    Consent:     Consent obtained:  Verbal    Consent given by:  Patient    Alternatives discussed:  No treatment  Anesthesia (see MAR for exact dosages):     Anesthesia method:  Topical application  Procedure details:     Indications: hoarseness, dysphagia, or aspiration and oncologic surveillance      Medication:  Afrin    Instrument: flexible fiberoptic laryngoscope      Scope location: left nare    Sinus/ Nasopharynx:     Right nasopharynx: patent and inflammation      Left nasopharynx: patent and inflammation      Right eustachian tube: patent      Left eustachian tube: inflammation, patent and inflammation    Oropharynx/ Supraglottis:     Posterior pharyngeal wall: inflamed      Oropharynx: inflammation      Oropharynx: no stenosis and no lesion      Vallecula: inflammation      Vallecula: no lesion      Base of tongue: inflammation      Base of tongue: no lingual tonsillar hypertrophy and no lesion      Epiglottis: inflammation and retroflexed (Mildly)      Epiglottis: no lesions       comment:  Poor retroversion of the esophagus with delayed initiation of swallowing and poor completion of swallowing  Larynx/ Hypopharynx:     Arytenoids: inflammation and interarytenoid edema       Arytenoids: no lesions      Hypopharynx: inflammation      Hypopharynx: no lesions      Pyriform sinus: inflammation and pooling of secretions (Both piriform areas as well as postcricoid area, thick secretions pooling and adherent, no direct aspiration)      Pyriform sinus: no lesion      False vocal cords: inflammation      False vocal cords: no lesion      True vocal cords: Yamilka's edema      True vocal cords: no immobility    Post-procedure details:     Patient tolerance of procedure:  Tolerated well  Comments:      Mild to moderate inflammation from nasopharynx down to the hypopharynx, including supraglottis  Significant pooled secretions in the postcricoid and bilateral piriform areas  Poor laryngeal elevation with initiation of swallowing  Supraglottis and glottis without evidence of lesions  True vocal cords completely mobile, no evidence of aspiration.     Result Review       RESULTS REVIEW    I have reviewed the patients old records in the chart.   I have reviewed the patients old records in the chart.  The following results/records were reviewed:   Progress Notes by Sosa Membreno CCC-SLP (07/26/2024 11:00)          Assessment & Plan  Oropharyngeal dysphagia    Oral phase dysphagia    History of pharyngeal cancer    Stenosis of esophagus    Mucositis due to radiation therapy    Moderate protein-calorie malnutrition  Patient's Body mass index is 17.71 kg/m².  BMI indicates unspecified protein-calorie malnutrition with health conditions related to an underlying condition that include prior cancer therapy. Weight issue is worsening. BMI is  worsening .  BMI management for patient includes the following: referral to primary care and treating the underlying disease process.  Trismus    Caries involving multiple surfaces of tooth    Dysarthria       Orders Placed This Encounter   Procedures    Flexible laryngoscopy       Diagnosis Plan   1. Oropharyngeal dysphagia  Flexible laryngoscopy      2. Oral phase  dysphagia  Flexible laryngoscopy      3. History of pharyngeal cancer  Flexible laryngoscopy      4. Stenosis of esophagus  Flexible laryngoscopy      5. Mucositis due to radiation therapy  Flexible laryngoscopy      6. Moderate protein-calorie malnutrition        7. Trismus  Flexible laryngoscopy      8. Caries involving multiple surfaces of tooth        9. Dysarthria  Flexible laryngoscopy             Medical and surgical options were discussed including observation, continued medical management, medication modification, and referral to Gen surgery for Feeding tube. Risks, benefits and alternatives were discussed and questions were answered. After considering the options, the patient decided to proceed with referral to primary care and general surgery for feeding tube placement.  Patient has had no improvement in his symptoms.  His trismus continues to be severe.  He is unable to chew.  He is eating puréed and drinkable items only 2 times daily.  I will refer him back to primary care for further therapy.  Feel the patient requires a G-tube.  He says he removed this 6 months ago in hopes that he would eat better without the G-tube.  I feel this is not happening.  Continue to see speech pathology for therapy.  Unable to get his device to treat his trismus at this point in time.  I feel all this is related to prior cancer therapy and fibrosis of head neck tissues.  The prognosis is poor at this time.  I recommended G-tube to the patient.  I will defer to primary care for any other solutions.  Refer to primary care for cachexia  Continue speech therapy  Consider replacement of G-tube  Call for problems    My Chart:  Patient is using My Chart    Patient, Friend  understand(s) and agree(s) with the treatment plan as described.    Return in about 6 weeks (around 9/11/2024) for Recheck Oral cavity and eating.        Electronically signed by Alli Card Jr, MD 07/31/24 11:15 AM CDT.

## 2024-07-31 NOTE — LETTER
July 31, 2024     Fortino Sierra DO  [address]  [address]  [facility] [address]    Patient: Fortino Dennis   YOB: 1950   Date of Visit: 7/31/2024     Dear Fortino Sierra DO:       Thank you for referring Fortino Dennis to me for evaluation. Below are the relevant portions of my assessment and plan of care.    If you have questions, please do not hesitate to call me. I look forward to following Fortino along with you.         Sincerely,        Alli Card Jr, MD        CC: No Recipients    Alli Card Jr., MD  07/31/24 1238  Sign when Signing Visit      Alli Card Jr, MD  [facility]  [address], [address]  [facility] [address]  Fax [phone]  Phone [phone]      Visit Type: FOLLOW UP   Chief Complaint   Patient presents with   • Difficulty Swallowing           HPI  Accompanied by: Friend  Fortino Dennis is a 73 y.o. male who presents for routine cancer follow up. He says speech helping. Still not able to open well. Not yet received device for mout stretching   He states he feels bad.  Not eating. Has no G tube.  States eating 2 b.ended meals daily.  No dental follow up    Oncology History:  Oncology/Hematology History   Squamous cell carcinoma of pharynx   6/15/2023 Initial Diagnosis    Squamous cell carcinoma of pharynx     10/25/2023 - 10/25/2023 Other Event    ENT surveillance  Patient with no evidence of recurrent disease.  He has severe trismus  The left mandibular free flap appears intact  Neck has banding from prior multiple surgeries and radiation  Patient appears to be cancer free at this time.  He wishes continued surveillance.  Alli Card Jr, MD  10/25/2023  12:59 CDT       4/29/2024 - 4/29/2024 Other Event    ENT oncologic surveillance:  Oncologic surveillance carried out today.  No evidence of recurrence.  Likely given that the patient is greater than  5 years out.  Oral and oropharyngeal dysphagia worsening slowly.  I feel this is due to brawny induration.    .cmnsignme     7/31/2024 - 7/31/2024 Other Event    ENT oncologic surveillance:  ENT oncologic surveillance carried out with no signs of recurrent disease.  Patient has significant trismus and oral as well as oropharyngeal dysphagia.  While the patient is out of the typical recurrent cancer.,  I will continue to follow because of his postradiation and postsurgical changes contributing to his weight loss and oropharyngeal dysfunction.    Alli Card Jr, MD  7/31/2024  12:30 CDT           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   • Sleep apnea    • Throat cancer    • Thyroid disorder    • Tinnitus 2020   • TMJ dysfunction 2020 wear        Past Surgical History:   Procedure Laterality Date   • CATARACT EXTRACTION WITH INTRAOCULAR LENS IMPLANT Bilateral     pt states x3 surgeries   • LARYNGOSCOPY Bilateral 7/7/2023    Procedure: MICRODIRECT LARYNGOSCOPY;  Surgeon: Alli Card Jr., MD;  Location: [facility];  Service: ENT;  Laterality: Bilateral;   • 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   • MANDIBLE SURGERY  2021    removal of necrotic jaw (part of 1 of 2 surgeries)   • MASTECTOMY Left     removal of breast due to benign growth   • PANENDOSCOPY Bilateral 7/7/2023    Procedure: DIRECT LARYNGOSCOPY, ESOPHAGOSCOPY,  BRONCHOSCOPY, MICRODIRECT LARYNGOSCOPY,OR ESOPHAGEAL DILATATION 38-48;  Surgeon: Alli Card Jr., MD;  Location: [facility];  Service: ENT;  Laterality: Bilateral;   • THROAT SURGERY  2007    cancerous mass removed       Family History: His family history includes Cancer in his mother; Colon cancer in his mother; Dementia in his mother; Hypertension in his mother.     Social History: He  reports that he has never smoked. He has never been exposed to tobacco smoke. He has never used smokeless tobacco. He reports current alcohol use of about 3.0 standard drinks of alcohol per week. He reports that he does not use drugs.    Home Medications:  Acetaminophen, Senna, amLODIPine, atorvastatin, donepezil, levothyroxine, rivaroxaban, venlafaxine XR, and zolpidem    Allergies:  He has No Known Allergies.       Vital Signs:   Temp:  [97.8 °F (36.6 °C)] 97.8 °F (36.6 °C)  Heart Rate:  [62] 62  BP: (110)/(70) 110/70  ENT Physical Exam  Constitutional  Appearance: patient appears well-developed, cachectic, patient is cooperative;  Communication/Voice: communication appropriate for developmental age; speech dysarthric; Communication comments: NPescape and dysarthria  Constitutional comments: Frail  Head and Face  Appearance: head appears normal, face appears normal and face appears atraumatic;  Palpation: facial palpation normal;  Salivary: glands normal;  Ear  Hearing: intact;  Auricles: bilateral auricles normal;  External Mastoids: right external mastoid normal; left external mastoid normal;  Ear Canals: bilateral ear canals normal;  Tympanic Membranes: bilateral tympanic membranes normal;  Nose  External Nose: nares patent bilaterally; external nose normal;  Internal Nose: nasal mucosa normal; Nasal mucosa comments: R side mid synecia, very thin   nasal septal deviation present; deviation is to the left, septal deviation is severe; Septum comments: R to L mid severe   bilateral inferior turbinates edematous and  erythematous;  Oral Cavity/Oropharynx  Lips: normal;  Teeth: missing teeth (L mandible with impnat) noted; dental caries present;  Gums: gingival recession present; Gum comments: gingivits  Tongue: surface is coated and smooth; Oral Tongue comments: very dry  Oral mucosa: mucous membranes dry; buccal mucosa Buccal Mucosa comments: thickened secretions  Hard palate: normal;  Soft palate: normal;  Tonsils: bilateral tonsils 1+, Tonsil comments: dry and coated  OC/OP comments: Trismus-opens to approximately 18 mm at the incisors  Neck  Neck: scars (Left neck dissection scar, well-healed) present; neck asymmetric (Status post left neck dissection); no neck mass present;  Thyroid: no thyroid mass present;  Neck comments: Left neck, well-healed  Very atrophic musculature of the right neck  Brawny induration left greater than right  Poor laryngeal elevation  Banding of neck muscles  Respiratory  Inspection: breathing unlabored; normal breathing rate;  Cardiovascular  Inspection: extremities are warm and well perfused; no peripheral edema present;  Lymphatic  Palpation: no cervical adenopathy noted;  Neurovestibular  Mental Status: alert and oriented;  Psychiatric: mood normal; affect is appropriate;  Drawings         Laryngoscopy    Date/Time: 7/31/2024 11:15 AM    Performed by: Alli Card Jr., MD  Authorized by: Alli Card Jr., MD    Consent:     Consent obtained:  Verbal    Consent given by:  Patient    Alternatives discussed:  No treatment  Anesthesia (see MAR for exact dosages):     Anesthesia method:  Topical application  Procedure details:     Indications: hoarseness, dysphagia, or aspiration and oncologic surveillance      Medication:  Afrin    Instrument: flexible fiberoptic laryngoscope      Scope location: left nare    Sinus/ Nasopharynx:     Right nasopharynx: patent and inflammation      Left nasopharynx: patent and inflammation      Right eustachian tube: patent      Left eustachian tube:  inflammation, patent and inflammation    Oropharynx/ Supraglottis:     Posterior pharyngeal wall: inflamed      Oropharynx: inflammation      Oropharynx: no stenosis and no lesion      Vallecula: inflammation      Vallecula: no lesion      Base of tongue: inflammation      Base of tongue: no lingual tonsillar hypertrophy and no lesion      Epiglottis: inflammation and retroflexed (Mildly)      Epiglottis: no lesions       comment:  Poor retroversion of the esophagus with delayed initiation of swallowing and poor completion of swallowing  Larynx/ Hypopharynx:     Arytenoids: inflammation and interarytenoid edema      Arytenoids: no lesions      Hypopharynx: inflammation      Hypopharynx: no lesions      Pyriform sinus: inflammation and pooling of secretions (Both piriform areas as well as postcricoid area, thick secretions pooling and adherent, no direct aspiration)      Pyriform sinus: no lesion      False vocal cords: inflammation      False vocal cords: no lesion      True vocal cords: Yamilka's edema      True vocal cords: no immobility    Post-procedure details:     Patient tolerance of procedure:  Tolerated well  Comments:      Mild to moderate inflammation from nasopharynx down to the hypopharynx, including supraglottis  Significant pooled secretions in the postcricoid and bilateral piriform areas  Poor laryngeal elevation with initiation of swallowing  Supraglottis and glottis without evidence of lesions  True vocal cords completely mobile, no evidence of aspiration.     Result Review      RESULTS REVIEW    I have reviewed the patients old records in the chart.   I have reviewed the patients old records in the chart.  The following results/records were reviewed:   Progress Notes by Sosa Membreno CCC-SLP (07/26/2024 11:00)          Assessment & Plan  Oropharyngeal dysphagia    Oral phase dysphagia    History of pharyngeal cancer    Stenosis of esophagus    Mucositis due to radiation therapy    Moderate  protein-calorie malnutrition  Patient's Body mass index is 17.71 kg/m².  BMI indicates unspecified protein-calorie malnutrition with health conditions related to an underlying condition that include prior cancer therapy. Weight issue is worsening. BMI is  worsening .  BMI management for patient includes the following: referral to primary care and treating the underlying disease process.  Trismus    Caries involving multiple surfaces of tooth    Dysarthria       Orders Placed This Encounter   Procedures   • Flexible laryngoscopy       Diagnosis Plan   1. Oropharyngeal dysphagia  Flexible laryngoscopy      2. Oral phase dysphagia  Flexible laryngoscopy      3. History of pharyngeal cancer  Flexible laryngoscopy      4. Stenosis of esophagus  Flexible laryngoscopy      5. Mucositis due to radiation therapy  Flexible laryngoscopy      6. Moderate protein-calorie malnutrition        7. Trismus  Flexible laryngoscopy      8. Caries involving multiple surfaces of tooth        9. Dysarthria  Flexible laryngoscopy             Medical and surgical options were discussed including observation, continued medical management, medication modification, and referral to Gen surgery for Feeding tube. Risks, benefits and alternatives were discussed and questions were answered. After considering the options, the patient decided to proceed with referral to primary care and general surgery for feeding tube placement.  Patient has had no improvement in his symptoms.  His trismus continues to be severe.  He is unable to chew.  He is eating puréed and drinkable items only 2 times daily.  I will refer him back to primary care for further therapy.  Feel the patient requires a G-tube.  He says he removed this 6 months ago in hopes that he would eat better without the G-tube.  I feel this is not happening.  Continue to see speech pathology for therapy.  Unable to get his device to treat his trismus at this point in time.  I feel all this is  related to prior cancer therapy and fibrosis of head neck tissues.  The prognosis is poor at this time.  I recommended G-tube to the patient.  I will defer to primary care for any other solutions.  Refer to primary care for cachexia  Continue speech therapy  Consider replacement of G-tube  Call for problems    My Chart:  Patient is using My Chart    Patient, Friend  understand(s) and agree(s) with the treatment plan as described.    Return in about 6 weeks (around 9/11/2024) for Recheck Oral cavity and eating.        Electronically signed by Alli Card Jr, MD 07/31/24 11:15 AM CDT.

## 2024-07-31 NOTE — ASSESSMENT & PLAN NOTE
Patient's Body mass index is 17.71 kg/m².  BMI indicates unspecified protein-calorie malnutrition with health conditions related to an underlying condition that include prior cancer therapy. Weight issue is worsening. BMI is worsening.  BMI management for patient includes the following: referral to primary care and treating the underlying disease process.

## 2024-08-02 ENCOUNTER — TREATMENT (OUTPATIENT)
Dept: PHYSICAL THERAPY | Facility: CLINIC | Age: 74
End: 2024-08-02
Payer: MEDICARE

## 2024-08-02 DIAGNOSIS — R25.2 TRISMUS: ICD-10-CM

## 2024-08-02 DIAGNOSIS — R13.12 OROPHARYNGEAL DYSPHAGIA: Primary | ICD-10-CM

## 2024-08-02 DIAGNOSIS — I89.0 LYMPHEDEMA: Primary | ICD-10-CM

## 2024-08-02 NOTE — PROGRESS NOTES
Speech Language Pathology Treatment Note  [address] [place] [address]    Patient: Fortino Dennis                                                                                     Visit Date: 2024  :     1950    Referring practitioner:    Alli Card Jr*  Date of Initial Visit:          Type: THERAPY  Noted: 2024    Patient seen for 18 sessions    Visit Diagnoses:    ICD-10-CM ICD-9-CM   1. Oropharyngeal dysphagia  R13.12 787.22   2. Trismus  R25.2 781.0       SUBJECTIVE     Patient arrived alert and ready for therapy. He had lymphedema therapy prior to ST today.    OBJECTIVE   GOALS  Goals                                            STG  Comments Status   Patient will improve oral skills to enhance safety and increase eating efficiency and bolus control as measured by increased lip closure, decreased anterior loss of bolus, improved bolus formation, improved posterior propulsion of the bolus, decreased drooling, and increased jaw ROM with decreased jaw pain . Patient is reportedly completing oral exercises at home. He has not called the company back about his orastretch for trismus.      Previous: ROM scale opening at evaluation on 24 was 24mm. Previous 2023 was 29mm, 5mm reduction in ROM.  Ongoing   Patient will improve hyolaryngeal elevation, improve epiglottic inversion, improve laryngeal closure, increase base of the tongue strength and posterior pharyngeal wall excursion, and increase efficiency of cough to clear residue from the airway as measured by decreased overt signs and symptoms of aspiration and decreased penetration and aspiration on repeat instrumental swallow study, if applicable.  Completed swallow exercise with NMES (estim). Patient able to complete swallows for 30 min of estim at A2 (fibrotic) at level 23.5 again today.  Ongoing   Patient will report decreased difficulty with swallowing    Occupational Therapy    Visit Type: initial evaluation  Precautions:  Medical precautions:  fall risk; standard precautions.  Confusion, encephalopathy and UTI  Reports history of R knee pain and spinal steonsis  Lines:     Basic: telemetry and capped IV      Lines in chart and on patient reviewed, precautions maintained throughout session.  Hearing: hard of hearing  Vision:     Current vision: wears glasses all the time  Safety Measures: chair alarm, bed alarm and bed rails    SUBJECTIVE  Patient agreed to participate in therapy this date.  Pt with complaints of not being allowed toilet, get out of bed, sit up, or walk without assist. Emphasized staff need to assist for safety at this time. Pt reports she was at baseline was using cane or using support of walls to ambulate short distances in home. Therapeutic listening provided throughout evaluation.     Patient / Family Goal: maximize function    Pain   RN informed on pain level    OBJECTIVE   Level of consciousness: alert    Oriented to person, place, time and situation     Arousal alertness: appropriate responses to stimuli    Affect/Behavior: appropriate, cooperative and pleasant  Patient activity tolerance: 1 to 2 activity to rest (weakness)  Functional Communication/Cognition    Overall status:  Impaired    Form of communication:  Verbal and nods/gestures appropriately     Attention span:  Appears intact    Attention Span Impairment: reduced memory    Commands: follows multistep commands with increased time and follows multistep commands with repetition.    Transition between tasks: transitions tasks without difficulty.    Safety judgement: decreased awareness of need for assistance and decreased awareness of need for safety.    Awareness of deficits: assistance required to compensate for deficits.  Range of Motion (measured in degrees unless otherwise indicated)  WFL: LUE RUE  Strength (out of 5 unless otherwise indicated)  5/5: LUE, RUE    Transfers:     Assistive devices: gait belt and 2-wheeled walker    Sit to stand: contact guard/touching/steadying assist and with verbal cues    Stand to sit: contact guard/touching/steadying assist and with verbal cues    Training completed:    Tasks: sit to stand, stand to sit and stand pivot    Education details: patient safety    VCs for safe hand placement on chair for force generation and reaching back prior to sitting.     Functional Ambulation:    Assistance:contact guard/touching/steadying assist and with verbal cues   Assistive device:2-wheeled walker and gait belt    Distance (ft): 2;2    Surface: even      Education details: patient safety  Details/Comments: Forward flexed posture, shuffled steps. VCs to upright posturing and safe walker proximity.     Activities of Daily Living (ADLs):  Lower Body Dressing:     Footwear assistance: moderate assist    Footwear position: chair    Assist needed for: don/doff left sock and don/doff right sock (figure four technique; able to doff IND )  Toilet transfer:     Assist: contact guard/touching/steadying assist and with verbal cues    Device: gait belt and 2-wheeled walker    Equipment: bedside commode  Activities of daily living training:   Pt reports she does not wear socks at baseline. Educated on increased risk of falls risk while barefoot.       Interventions    Training provided: ADL training, activity tolerance, balance retraining, transfer training, safety training and compensatory techniques  Skilled input: verbal instruction/cues  Verbal Consent: Writer verbally educated and received verbal consent for hand placement, positioning of patient, and techniques to be performed today from patient for clothing adjustments for techniques and therapist position for techniques as described above and how they are pertinent to the patient's plan of care.  Additional Intervention Details: Extensive extra time spent educating patient on discharge recommendation of [facility] and safety  and improved EAT-10 score. Initial eat 10 score 14, Mild. Patient reports no changes since last session.  Ongoing   Patient will improve oral hygiene to enhance safety and decrease risk of aspiration pneumonia Patient reports no changes since last session.     Ongoing   LTG        Patient will safely consume PO diet of 0 - Thin drinks and 4 - Puree foods to maintain nutrition/hydration without complications such as aspiration or pneumonia.  Patient is currently on PO diet. He c/o difficulty with coughing, extra effort, and difficulty eating around others.  Ongoing       Therapy Education/Self Care    Details: POC   Given Home exercises and education.    Progress: Reinforced   Education provided to:  Patient   Level of understanding Verbalized             Total Time of Visit:             45   mins         ASSESSMENT/PLAN     ASSESSMENT:  Patient was able to complete swallowing with estim for 30 min at A2 (fibrotic) at level 23.5 today. He reports no changes since last visit. Chart review after visit indicates Dr. Card has ordered general surgery for PEG tube replacement due to poor PO intake and worsening malnutrition. Patient did not mention his ENT visit during tx today.     PLAN: Continue therapy and home exercises.    Progress Note Due Date:8/14/2024  Recertification Due Date: 8/14/2024    Signature:  Sabiha Sánchez, Speech Therapy Student  Electronically signed on 8/2/2024    The clinical instructor and/or supervising staff, Sosa Membreno CCC-SLP, was present in clinic guiding the visit by approving, concurring, and confirming the skilled judgement for all services rendered.    Signature:  Sosa Membreno CCC-SLP, [place] License #: [id number]  Electronically signed on 8/2/2024          [facility] [place] [address]  [phone]       risk of discharging directly home at this time. Pt reports she is hesitant about [facility] because of work at home and bills that need to be paid. Questions regarding length of stay, goals, and scope of therapy practice answered. Insurance coverage questions deferred to SW/case management and handed off at end of session.         ASSESSMENT    Impairments: activity tolerance, balance, bed mobility, cognitive, decreased insight into deficits, safety awareness and strength  Functional Limitations: bed mobility, functional mobility, grooming, bathing, toileting, functional transfers, dressing and showering  Personal Occupations Profile Affected: bathing/showering, functional mobility/transfers, personal hygiene/grooming, toileting/toilet hygiene, lower body dressing, upper body dressing    Occupational Therapy (OT) orders received due to impairments in ADL and instrumental-ADLS related to weakness, cognitive deficits and medical status. Admit for encephalopathy, CHF, and acute UTI.     The patient is currently functioning at moderate assist for ADL tasks. Patient presents below baseline which was modified independent with activities of daily living.    For safe return to prior living situation the patient needs to be at a modified independent  level for ADLs.     The prognosis for goal achievement is fair.  Discharge Recommendations:  Recommendations for Discharge: OT WI: Sub-acute nursing home      PT/OT Mobility Equipment for Discharge: Will monitor, owns cane  PT/OT ADL Equipment for Discharge: Will monitor  OT Identified Barriers to Discharge: none to [facility] - home alone, safety, cognition, weakness     Skilled therapy is required to address these limitations in attempt to maximize the patient's independence.  Clinical decision making: Moderate - Patient has several limitations (3-5), comorbidities and/or complexities, as noted in detailed assessment above, that impact their occupational profile.  Resulting in several  treatment options and minimal to moderate task modification consistent with moderate clinical decision making complexity.    End of Session:   Location: in chair  Safety measures: alarm system in place/re-engaged, lines intact and call light within reach  Handoff to: nurse Jhoana)  Education Provided:   Learning assessment:  - Primary learner: patient  - Are they ready to learn: yes  - Preferred learning style: verbal  - Barriers to learning: cognitive  Education provided during session:  - Receiving education: patient  - Results of above outlined education: Needs reinforcement    PLAN  Suggestions for next session as indicated: Bathing/grooming in chair, LB dressing with AE, toileting with hygiene/clothing management     Frequency Comments: [facility] 11/16    Interventions: activity tolerance training, ADL retraining, balance, bed mobility training, cognitive retraining, coordination, functional transfer training, neuromuscular reeducation, patient education, positioning, patient/family training, transfer training, therapeutic exercise, upper extremity strengthening/ROM, compensatory techniques, compensatory technique education, therapeutic activity, safety training, caregiver training and equipment eval/education  Agreement to plan and goals: patient agrees with goals and treatment plan      GOALS  Review Date: 11/22/2021  Long Term Goals: (to be met by time of discharge from hospital)  Grooming: Patient will complete grooming tasks in sitting and at sink modified independent.  Upper body dressing: Patient will complete upper body dressing modified independent.  Lower body dressing: Patient will complete lower body dressing in sitting and in standing modified independent.  Toileting: Patient will complete toileting modified independent.  Bathing: Patient will complete bathing sitting at sinkmodified independent Toilet transfer: Patient will complete toilet transfer with modified independent.   Home setting transfer:  Patient will complete home setting transfers with modified independent.         Documented in the chart in the following areas: Prior Level of Function. Pain. Assessment. Plan.      Therapy procedure time and total treatment time can be found documented on the Time Entry flowsheet

## 2024-08-02 NOTE — PROGRESS NOTES
Physical Therapy Treatment Note  [address] [place] [address]    Patient: Fortino Dennis                                                 Visit Date: 2024  :     1950    Referring practitioner:    Alli Card Jr*  Date of Initial Visit:          Type: THERAPY  Noted: 2024    Patient seen for 2 sessions    Visit Diagnoses:    ICD-10-CM ICD-9-CM   1. Lymphedema  I89.0 457.1     SUBJECTIVE     Subjective: He knows he needs to work on the HEP.      PAIN: 0/10         OBJECTIVE     Objective    Lymphedema       Row Name 24 1315             Subjective Pain    Able to rate subjective pain? yes  -AL      Pre-Treatment Pain Level 0  -AL         Manual Lymphatic Drainage    Manual Lymphatic Drainage initial sequence;head/neck treatment;opened regional lymph nodes  -AL      Initial Sequence full neck;abdomen;diaphragmatic breathing  -AL      Abdomen superficial  -AL      Diaphragmatic Breathing x 9 with abdomen  -AL      Opened Regional Lymph Nodes axillary  -AL      Axillary right;left  -AL      Head/Neck Treatment pre-auricular nodes;post-auricular nodes;occipital nodes;sublingual nodes;full/complex MLD  -AL      Manual Lymphatic Drainage Comments IASTM to the anterior neck using hand held massager.  Scar tissue massage anterior neck  -AL      Manual Therapy 30  -AL                User Key  (r) = Recorded By, (t) = Taken By, (c) = Cosigned By      Initials Name Provider Type    AL Pastora Hamlin PTA, [facility] Physical Therapist Assistant                  Therapeutic Exercises    70327 Units Comments   Stretched neck into B rotation and B sidebending.  Also stretched B SCM     Occipital release                    Timed Minutes 10        Therapy Education/Self Care 80545   Education offered today Lymph system, self massager, using hand held massager over anterior neck   Medbridge Code    Ongoing HEP   Work on MLD and HEP    Timed Minutes        Total Timed Treatment:     40   mins  Total Time of Visit:             40   mins         ASSESSMENT/PLAN     GOALS  Goals                                          Progress Note due by 8/11/24                                                      Recert due by 10/9/24   STG by: 4 weeks Comments Date Status   Patient will have a good basic understanding of lymphedema and its suggested risk reduction practices  Educated about the lymph system 8/2 Ongoing   Patient will be independent with remedial HEP for lymphedema  He knows he needs to work on the HEP more 8/2 Ongoing   Patient will be independent with self MLD for lymphedema                   LTG by: 8 Weeks         Patient will have appropriate compression garments for lymphedema maintenance         Patient will have no sign or symptoms of infection         Patient will be independent with comprehensive maintenance program for lymphedema                                            Assessment/Plan     ASSESSMENT:   Patient's neck is tight today but he was able to tolerate the stretches.  He has dense tissue anterior neck which softens with the MLD and IASTM.  He will work on the HEP and self massage. Informed patient [employer] has been trying to contact him.     PLAN:   Continue with MLD    SIGNATURE: Pastora Hamlin PTA, [facility] [place] License #: [id number]  Electronically Signed on 8/2/2024        [address] [place] [address] [place] [address]  [phone]

## 2024-08-07 ENCOUNTER — TREATMENT (OUTPATIENT)
Dept: PHYSICAL THERAPY | Facility: CLINIC | Age: 74
End: 2024-08-07
Payer: MEDICARE

## 2024-08-07 DIAGNOSIS — I89.0 LYMPHEDEMA: Primary | ICD-10-CM

## 2024-08-08 ENCOUNTER — OFFICE VISIT (OUTPATIENT)
Dept: INTERNAL MEDICINE | Facility: CLINIC | Age: 74
End: 2024-08-08
Payer: MEDICARE

## 2024-08-08 VITALS
SYSTOLIC BLOOD PRESSURE: 100 MMHG | HEIGHT: 73 IN | RESPIRATION RATE: 16 BRPM | BODY MASS INDEX: 17.95 KG/M2 | OXYGEN SATURATION: 98 % | DIASTOLIC BLOOD PRESSURE: 76 MMHG | WEIGHT: 135.4 LBS | HEART RATE: 57 BPM

## 2024-08-08 DIAGNOSIS — K08.9 POOR DENTITION: ICD-10-CM

## 2024-08-08 DIAGNOSIS — K12.33 MUCOSITIS DUE TO RADIATION THERAPY: ICD-10-CM

## 2024-08-08 DIAGNOSIS — G31.84 MILD COGNITIVE IMPAIRMENT: ICD-10-CM

## 2024-08-08 DIAGNOSIS — R13.12 OROPHARYNGEAL DYSPHAGIA: ICD-10-CM

## 2024-08-08 DIAGNOSIS — I10 PRIMARY HYPERTENSION: ICD-10-CM

## 2024-08-08 DIAGNOSIS — G47.00 INSOMNIA, UNSPECIFIED TYPE: ICD-10-CM

## 2024-08-08 DIAGNOSIS — C10.2 MALIGNANT NEOPLASM OF LATERAL WALL OF OROPHARYNX: ICD-10-CM

## 2024-08-08 DIAGNOSIS — C14.0 SQUAMOUS CELL CARCINOMA OF PHARYNX: Primary | ICD-10-CM

## 2024-08-08 DIAGNOSIS — F33.1 MODERATE EPISODE OF RECURRENT MAJOR DEPRESSIVE DISORDER: ICD-10-CM

## 2024-08-08 DIAGNOSIS — R63.6 UNDERWEIGHT: ICD-10-CM

## 2024-08-08 DIAGNOSIS — I48.91 NEW ONSET ATRIAL FIBRILLATION: ICD-10-CM

## 2024-08-08 DIAGNOSIS — E43 SEVERE PROTEIN-CALORIE MALNUTRITION: ICD-10-CM

## 2024-08-08 PROCEDURE — 99214 OFFICE O/P EST MOD 30 MIN: CPT | Performed by: INTERNAL MEDICINE

## 2024-08-08 PROCEDURE — 1159F MED LIST DOCD IN RCRD: CPT | Performed by: INTERNAL MEDICINE

## 2024-08-08 PROCEDURE — 3078F DIAST BP <80 MM HG: CPT | Performed by: INTERNAL MEDICINE

## 2024-08-08 PROCEDURE — G2211 COMPLEX E/M VISIT ADD ON: HCPCS | Performed by: INTERNAL MEDICINE

## 2024-08-08 PROCEDURE — 3074F SYST BP LT 130 MM HG: CPT | Performed by: INTERNAL MEDICINE

## 2024-08-08 PROCEDURE — 1160F RVW MEDS BY RX/DR IN RCRD: CPT | Performed by: INTERNAL MEDICINE

## 2024-08-08 PROCEDURE — 1126F AMNT PAIN NOTED NONE PRSNT: CPT | Performed by: INTERNAL MEDICINE

## 2024-08-08 RX ORDER — AMLODIPINE BESYLATE 5 MG/1
5 TABLET ORAL 2 TIMES WEEKLY
Qty: 180 TABLET | Refills: 1 | Status: CANCELLED | OUTPATIENT
Start: 2024-08-08

## 2024-08-08 RX ORDER — CYCLOSPORINE 0.5 MG/ML
1 EMULSION OPHTHALMIC 2 TIMES DAILY
COMMUNITY
Start: 2024-05-08

## 2024-08-08 RX ORDER — VENLAFAXINE HYDROCHLORIDE 150 MG/1
150 CAPSULE, EXTENDED RELEASE ORAL DAILY
Qty: 90 CAPSULE | Refills: 1 | Status: SHIPPED | OUTPATIENT
Start: 2024-08-08

## 2024-08-08 RX ORDER — ZOLPIDEM TARTRATE 10 MG/1
10 TABLET ORAL NIGHTLY PRN
Qty: 30 TABLET | Refills: 1 | Status: SHIPPED | OUTPATIENT
Start: 2024-08-08

## 2024-08-08 RX ORDER — DONEPEZIL HYDROCHLORIDE 5 MG/1
5 TABLET, FILM COATED ORAL NIGHTLY
Qty: 90 TABLET | Refills: 1 | Status: SHIPPED | OUTPATIENT
Start: 2024-08-08

## 2024-08-09 ENCOUNTER — TREATMENT (OUTPATIENT)
Dept: PHYSICAL THERAPY | Facility: CLINIC | Age: 74
End: 2024-08-09
Payer: MEDICARE

## 2024-08-09 DIAGNOSIS — I89.0 LYMPHEDEMA: Primary | ICD-10-CM

## 2024-08-09 NOTE — PROGRESS NOTES
Physical Therapy Treatment Note and 30 Day Progress Note  [address] [place] [address]    Patient: Fortino Dennis                                                 Visit Date: 2024  :     1950    Referring practitioner:    Alli Card Jr*  Date of Initial Visit:          Type: THERAPY  Noted: 2024    Patient seen for 4 sessions    Visit Diagnoses:    ICD-10-CM ICD-9-CM   1. Lymphedema  I89.0 457.1     SUBJECTIVE     Subjective: He has been working on the MLD but knows he needs to work on it more. He felt better after his last treatment, he was more relaxed and less secretions.       PAIN: 0/10         OBJECTIVE     Objective    Lymphedema       Row Name 24 1100             Subjective Pain    Able to rate subjective pain? yes  -AL      Pre-Treatment Pain Level 0  -AL         Manual Lymphatic Drainage    Manual Lymphatic Drainage initial sequence;head/neck treatment;opened regional lymph nodes  -AL      Initial Sequence full neck;abdomen;diaphragmatic breathing  -AL      Abdomen superficial  -AL      Diaphragmatic Breathing x 9 with abdomen  -AL      Opened Regional Lymph Nodes axillary  -AL      Axillary right;left  -AL      Head/Neck Treatment pre-auricular nodes;post-auricular nodes;occipital nodes;sublingual nodes;full/complex MLD  -AL      Manual Lymphatic Drainage Comments IASTM to the anterior neck using hand held massager. Scar tissue massage anterior neck  -AL      Manual Therapy 30  -AL         Compression/Skin Care    Compression/Skin Care skin care  -AL      Compression/Skin Care Comments Discussed compression garments.  -AL                User Key  (r) = Recorded By, (t) = Taken By, (c) = Cosigned By      Initials Name Provider Type    AL Pastora Hamlin, PTA, [facility] Physical Therapist Assistant                    Therapeutic Exercises    78044 Units Comments   Stretched neck into B rotation and B  sidebending.  Also stretched B SCM     Occipital release                    Timed Minutes 10        Therapy Education/Self Care 23428   Education offered today Look into purchasing a hand held massager and home traction unit   Medbridge Code    Ongoing HEP   Work on MLD and HEP   Timed Minutes        Total Timed Treatment:     40   mins  Total Time of Visit:             40   mins         ASSESSMENT/PLAN     GOALS  Goals                                          Progress Note due by 9/8/24                                                      Recert due by 10/9/24   STG by: 4 weeks Comments Date Status   Patient will have a good basic understanding of lymphedema and its suggested risk reduction practices Continue to educate 8/9 Ongoing   Patient will be independent with remedial HEP for lymphedema  He is working on the HEP 8/9 Ongoing   Patient will be independent with self MLD for lymphedema He knows he needs to work on the MLD 8/9 Ongoing             LTG by: 8 Weeks         Patient will have appropriate compression garments for lymphedema maintenance He will order the neck compression garment.  8/9 Ongoing   Patient will have no sign or symptoms of infection  No open areas no weeping 8/9 Ongoing   Patient will be independent with comprehensive maintenance program for lymphedema  Working towards home maintenance program 8/9 Ongoing                                      Assessment/Plan     ASSESSMENT:   Patient felt better after his last treatment, he likes the neck stretches.  He will look into purchasing a home pneumatic traction unit and hand held massager for home use.  He is also planning on ordering neck compression.     PLAN:   Continue with MLD    SIGNATURE: Pastora Hamlin PTA, [place], [place] License #: [id number]  Electronically Signed on 8/9/2024        [address] [place] [address]. [address]  [phone]

## 2024-08-09 NOTE — PROGRESS NOTES
Physical Therapy Treatment Note  [address] [place] [address]    Patient: Fortino Dennis                                                 Visit Date: 2024  :     1950    Referring practitioner:    Alli Card Jr*  Date of Initial Visit:          Type: THERAPY  Noted: 2024    Patient seen for 3 sessions    Visit Diagnoses:    ICD-10-CM ICD-9-CM   1. Lymphedema  I89.0 457.1     SUBJECTIVE     Subjective: He knows he needs to work on the HEP. He hasn't called [employer] back either.       PAIN: 0/10         OBJECTIVE     Objective        24 1300   Subjective Pain   Able to rate subjective pain? yes   Manual Lymphatic Drainage   Manual Lymphatic Drainage initial sequence;head/neck treatment;opened regional lymph nodes   Initial Sequence full neck;abdomen;diaphragmatic breathing   Abdomen superficial   Diaphragmatic Breathing x 9 with abdomen   Opened Regional Lymph Nodes axillary   Axillary right;left   Head/Neck Treatment pre-auricular nodes;post-auricular nodes;occipital nodes;sublingual nodes;full/complex MLD   Manual Lymphatic Drainage Comments IASTM to the anterior neck using hand held massager. Scar tissue massage anterior neck   Manual Therapy 40       Therapy Education/Self Care 43695   Education offered today Lymph system, self massager, using hand held massager over anterior neck   Medbridge Code    Ongoing HEP   Work on MLD and HEP   Timed Minutes        Total Timed Treatment:     40   mins  Total Time of Visit:             40   mins         ASSESSMENT/PLAN     GOALS  Goals                                          Progress Note due by 24                                                      Recert due by 10/9/24   STG by: 4 weeks Comments Date Status   Patient will have a good basic understanding of lymphedema and its suggested risk reduction practices  Educated about the lymph system  Ongoing   Patient will  be independent with remedial HEP for lymphedema  He knows he needs to work on the HEP more 8/2 Ongoing   Patient will be independent with self MLD for lymphedema                   LTG by: 8 Weeks         Patient will have appropriate compression garments for lymphedema maintenance         Patient will have no sign or symptoms of infection         Patient will be independent with comprehensive maintenance program for lymphedema  Reminded him to call [employer] back about the pump                                          Assessment/Plan     ASSESSMENT:   Patient's neck is tight today but he was able to tolerate the stretches.  He has dense tissue anterior neck which softens with the MLD and IASTM.      PLAN:   Continue with MLD    SIGNATURE: Ellen Rosenbaum, PT, DPT, [facility] License #: [id number]  Electronically Signed on 8/9/2024        [address] [place] [address]  [phone]

## 2024-08-11 NOTE — PROGRESS NOTES
CC: f/u underweight and poor intake    History:  Fortino Dennis is a 73 y.o. male   History of Present Illness  The patient presents for a wellness visit.    He is experiencing dental issues, having lost two teeth. He has had previous dilation of the esophagus, but continues to have issues with swallowing and mainteance of his weight. . His sense of taste is diminished. His first tube was inserted in [place] by Dr. Rivera.  He used to have his teeth cleaned four times a year, but has not done so for a year and a half since moving. His dental issues began suddenly, leading him to believe he was malnourished.    He takes amlodipine daily, taking 1 or 2 pills depending on how he feels.        ROS:  Review of Systems   Constitutional:  Positive for appetite change. Negative for chills and fever.   HENT:  Positive for trouble swallowing.    Respiratory:  Negative for cough and shortness of breath.    Cardiovascular:  Negative for chest pain and palpitations.   Gastrointestinal:  Negative for abdominal pain and constipation.        reports that he has never smoked. He has never been exposed to tobacco smoke. He has never used smokeless tobacco. He reports current alcohol use of about 3.0 standard drinks of alcohol per week. He reports that he does not use drugs.      Current Outpatient Medications:     Acetaminophen (TYLENOL ARTHRITIS PAIN PO), Take 0.5 tablets by mouth Daily As Needed., Disp: , Rfl:     atorvastatin (LIPITOR) 20 MG tablet, Take 1 tablet by mouth Daily., Disp: 90 tablet, Rfl: 3    donepezil (ARICEPT) 5 MG tablet, Take 1 tablet by mouth Every Night., Disp: 90 tablet, Rfl: 1    levothyroxine (SYNTHROID, LEVOTHROID) 50 MCG tablet, Take 1 tablet by mouth Daily., Disp: , Rfl:     Restasis 0.05 % ophthalmic emulsion, Administer 1 drop to both eyes 2 (Two) Times a Day., Disp: , Rfl:     rivaroxaban (XARELTO) 20 MG tablet, Take 1 tablet by mouth Daily., Disp: 90 tablet, Rfl: 1    SENNA PO, Take 1 capsule by  "mouth Daily., Disp: , Rfl:     venlafaxine XR (Effexor XR) 150 MG 24 hr capsule, Take 1 capsule by mouth Daily., Disp: 90 capsule, Rfl: 1    zolpidem (AMBIEN) 10 MG tablet, Take 1 tablet by mouth At Night As Needed for Sleep., Disp: 30 tablet, Rfl: 1    nystatin (MYCOSTATIN) 100,000 unit/mL suspension, Swish and swallow 5 mL 4 (Four) Times a Day., Disp: 473 mL, Rfl: 1    OBJECTIVE:  /76 (BP Location: Left arm, Patient Position: Sitting, Cuff Size: Adult)   Pulse 57   Resp 16   Ht 185.4 cm (73\")   Wt 61.4 kg (135 lb 6.4 oz)   SpO2 98%   BMI 17.86 kg/m²    Physical Exam  Constitutional:       General: He is not in acute distress.  Pulmonary:      Effort: Pulmonary effort is normal. No respiratory distress.   Neurological:      Mental Status: He is alert and oriented to person, place, and time.   Psychiatric:         Mood and Affect: Mood normal.         Behavior: Behavior normal.           Results      Assessment/Plan     Diagnoses and all orders for this visit:    1. Squamous cell carcinoma of pharynx (Primary)  He has undergone therapy, but has ongoing trismus and difficulty with swallowing. Given his difficulty in maintaining weight, his ENT feels that he would benefit from replacement of gastrostomy tube and he agrees with this for proper nutrition after his previous one fell out.     2. 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 Mucositis due to radiation therapy  -     nystatin (MYCOSTATIN) 100,000 unit/mL suspension; Swish and swallow 5 mL 4 (Four) Times a Day.  Dispense: 473 mL; Refill: 1  -     Ambulatory Referral to Gastroenterology  -     Ambulatory Referral to Oral Maxillofacial Surgery  Referral to an oral surgeon to address his resultant dental issues after radiation and therapy with ongoing dry mucosa and trismus with having had teeth fall out without provocation.     4. Oropharyngeal dysphagia  5. Severe protein-calorie " malnutrition  -     Ambulatory Referral to Gastroenterology    6. Underweight  COntinue high protein, rip calorie diet as tolerated.     7. Insomnia, unspecified type  -     zolpidem (AMBIEN) 10 MG tablet; Take 1 tablet by mouth At Night As Needed for Sleep.  Dispense: 30 tablet; Refill: 1  PDMP data reviewed and AMbien refilled, which has done well.     8. Moderate episode of recurrent major depressive disorder  -     venlafaxine XR (Effexor XR) 150 MG 24 hr capsule; Take 1 capsule by mouth Daily.  Dispense: 90 capsule; Refill: 1  Stable on SSRI.     9. New onset atrial fibrillation  -     rivaroxaban (XARELTO) 20 MG tablet; Take 1 tablet by mouth Daily.  Dispense: 90 tablet; Refill: 1  Stable on anticoagulation and auto rate-controlled.     10. Mild cognitive impairment  -     donepezil (ARICEPT) 5 MG tablet; Take 1 tablet by mouth Every Night.  Dispense: 90 tablet; Refill: 1  Continue Aricept.     11. Primary hypertension  Well controlled, BP goal for age is <140/90 per JNC 8 guidelines, and continue current medications    12. Poor dentition  -     Ambulatory Referral to Oral Maxillofacial Surgery      An After Visit Summary was printed and given to the patient at discharge.  Return in about 2 months (around 10/8/2024) for Recheck; cancel September.      Patient or patient representative verbalized consent for the use of Ambient Listening during the visit with  Fortino Sierra DO for chart documentation. 8/11/2024  14:38 CDT    Fortino Sierra D.O. 8/11/2024   Electronically signed.

## 2024-08-14 ENCOUNTER — TREATMENT (OUTPATIENT)
Dept: PHYSICAL THERAPY | Facility: CLINIC | Age: 74
End: 2024-08-14
Payer: MEDICARE

## 2024-08-14 DIAGNOSIS — R13.12 OROPHARYNGEAL DYSPHAGIA: Primary | ICD-10-CM

## 2024-08-14 DIAGNOSIS — R25.2 TRISMUS: ICD-10-CM

## 2024-08-14 DIAGNOSIS — I89.0 LYMPHEDEMA: Primary | ICD-10-CM

## 2024-08-14 PROCEDURE — 97110 THERAPEUTIC EXERCISES: CPT | Performed by: PHYSICAL THERAPIST

## 2024-08-14 PROCEDURE — 92526 ORAL FUNCTION THERAPY: CPT | Performed by: SPEECH-LANGUAGE PATHOLOGIST

## 2024-08-14 PROCEDURE — 97140 MANUAL THERAPY 1/> REGIONS: CPT | Performed by: PHYSICAL THERAPIST

## 2024-08-14 NOTE — PROGRESS NOTES
Speech Language Pathology Treatment Note and 90 Day Recertification Note  [address] [place] [address]    Patient: Fortino Dennis                                                                                     Visit Date: 2024  :     1950    Referring practitioner:    Alli Card Jr*  Date of Initial Visit:          Type: THERAPY  Noted: 2024    Patient seen for 19 sessions    Visit Diagnoses:    ICD-10-CM ICD-9-CM   1. Oropharyngeal dysphagia  R13.12 787.22   2. Trismus  R25.2 781.0         SUBJECTIVE     Patient arrived alert and ready for therapy. He has seen Dr. Card. He has had more weight loss. He plans to have his PEG tube replaced due to cachexia.     OBJECTIVE   GOALS  Goals                                            STG  Comments Status   Patient will improve oral skills to enhance safety and increase eating efficiency and bolus control as measured by increased lip closure, decreased anterior loss of bolus, improved bolus formation, improved posterior propulsion of the bolus, decreased drooling, and increased jaw ROM with decreased jaw pain . Patient is reportedly completing oral exercises at home. He has not called the company back about his orastretch for trismus.      Previous: ROM scale opening at evaluation on 24 was 24mm. Previous 2023 was 29mm, 5mm reduction in ROM.  Ongoing   Patient will improve hyolaryngeal elevation, improve epiglottic inversion, improve laryngeal closure, increase base of the tongue strength and posterior pharyngeal wall excursion, and increase efficiency of cough to clear residue from the airway as measured by decreased overt signs and symptoms of aspiration and decreased penetration and aspiration on repeat instrumental swallow study, if applicable.  Completed swallow exercise with NMES (estim). Patient able to complete swallows for 30 min of estim at A2  (fibrotic) at level 23 today.  Ongoing   Patient will report decreased difficulty with swallowing and improved EAT-10 score. Initial eat 10 score 14, Mild. Patient reports no changes since last session.  Ongoing   Patient will improve oral hygiene to enhance safety and decrease risk of aspiration pneumonia Patient reports no changes since last session.     Ongoing   LTG        Patient will safely consume PO diet of 0 - Thin drinks and 4 - Puree foods to maintain nutrition/hydration without complications such as aspiration or pneumonia.  Patient is currently on PO diet. He c/o difficulty with coughing, extra effort, and difficulty eating around others.  Ongoing       Therapy Education/Self Care    Details: POC   Given Home exercises and education.    Progress: Reinforced   Education provided to:  Patient   Level of understanding Verbalized             Total Time of Visit:             45   mins         ASSESSMENT/PLAN     ASSESSMENT:  Patient was able to complete swallowing with estim for 30 min at A2 (fibrotic) at level 23.5 today. He reports no changes since last visit. Chart review after visit indicates Dr. Card has ordered general surgery for PEG tube replacement due to poor PO intake and worsening malnutrition. Patient did not mention his ENT visit during tx today.     PLAN: Continue therapy and home exercises.    Progress Note Due Date:8/14/2024  Recertification Due Date: 8/14/2024    Signature:  Sosa Membreno CCC-SLP, [place] License #: [id number]  Electronically signed on 8/14/2024      Clinical Progress: unchanged  Home Program Compliance: Yes  Progress toward previous goals: Not Met      90 Day Recertification  Certification Period: 8/14/2024 through 11/11/2024  I certify that the therapy services are furnished while this patient is under my care.  The services outlined above are required by this patient, and will be reviewed every 90 days.     PHYSICIAN: Alli Card Jr., MD (NPI:  [id number])    Signature:___________________________________________DATE: _________    Please sign and return via fax to [phone].   Thank you so much for letting us work with Fortino. I appreciate your letting us work with your patients. If you have any questions or concerns, please don't hesitate to contact me.            [address] [address]  [phone]

## 2024-08-14 NOTE — PROGRESS NOTES
Physical Therapy Treatment Note  [address] [place] [address]    Patient: Fortino Dennis                                                 Visit Date: 2024  :     1950    Referring practitioner:    Alli Card Jr*  Date of Initial Visit:          Type: THERAPY  Noted: 2024    Patient seen for 5 sessions    Visit Diagnoses:    ICD-10-CM ICD-9-CM   1. Lymphedema  I89.0 457.1     SUBJECTIVE     Subjective: He does like the vibration and is going to get one of the massager he can use at home since he knows it helps.        PAIN: 0/10         OBJECTIVE     Objective    Lymphedema       Row Name 24 1400             Subjective Pain    Able to rate subjective pain? yes  -HR      Pre-Treatment Pain Level 0  -HR         Manual Lymphatic Drainage    Manual Lymphatic Drainage initial sequence;head/neck treatment;opened regional lymph nodes  -HR      Initial Sequence full neck;abdomen;diaphragmatic breathing  -HR      Abdomen superficial  -HR      Opened Regional Lymph Nodes axillary  -HR      Axillary right;left  -HR      Head/Neck Treatment pre-auricular nodes;post-auricular nodes;occipital nodes;sublingual nodes;full/complex MLD  -HR      Manual Lymphatic Drainage Comments IASTM to the anterior neck using hand held massager. Scar tissue massage anterior neck  -HR      Manual Therapy 30  -HR                User Key  (r) = Recorded By, (t) = Taken By, (c) = Cosigned By      Initials Name Provider Type    HR Ellen Rosenbaum, PT, DPT, CLT-JULIA Physical Therapist                    Therapeutic Exercises    26511 Units Comments   Stretched neck into B rotation and B sidebending.  Also stretched B SCM     Occipital release     Suboccipital release with 1 hand and gave some inferior pressure through shoulder  1 at a time             Timed Minutes 15        Therapy Education/Self Care 29512   Education offered today Look into  purchasing a hand held massager and home traction unit   Medbridge Code    Ongoing HEP   Work on MLD and HEP   Timed Minutes        Total Timed Treatment:     45   mins  Total Time of Visit:             45   mins         ASSESSMENT/PLAN     GOALS  Goals                                          Progress Note due by 9/8/24                                                      Recert due by 10/9/24   STG by: 4 weeks Comments Date Status   Patient will have a good basic understanding of lymphedema and its suggested risk reduction practices Continue to educate 8/9 Ongoing   Patient will be independent with remedial HEP for lymphedema  He is working on the HEP 8/9 Ongoing   Patient will be independent with self MLD for lymphedema He knows he needs to work on the MLD 8/9 Ongoing             LTG by: 8 Weeks         Patient will have appropriate compression garments for lymphedema maintenance He will order the neck compression garment.  8/9 Ongoing   Patient will have no sign or symptoms of infection  No open areas no weeping 8/9 Ongoing   Patient will be independent with comprehensive maintenance program for lymphedema  Working towards home maintenance program. Plans to get a massager 8/14 Ongoing                                      Assessment/Plan     ASSESSMENT:   Patient does feel like he is going to have to get a feeding tube placed as he has lost some weight. He was asking about strengthening but we will hold off on that until he is able to at least maintain a weight and hopefully put weight back on. He got a lot of relief with the neck stretches with stabilization and overpressure to the shoulder.      PLAN:   Continue with MLD    SIGNATURE: Ellen Rosenbaum, PT, DPT, HELEN-BRIAN DUMONT License #: [id number]  Electronically Signed on 8/14/2024        [address] [place] [address] [place] [address]  [phone]

## 2024-08-16 ENCOUNTER — TREATMENT (OUTPATIENT)
Dept: PHYSICAL THERAPY | Facility: CLINIC | Age: 74
End: 2024-08-16
Payer: MEDICARE

## 2024-08-16 DIAGNOSIS — R25.2 TRISMUS: ICD-10-CM

## 2024-08-16 DIAGNOSIS — I89.0 LYMPHEDEMA: Primary | ICD-10-CM

## 2024-08-16 DIAGNOSIS — R13.12 OROPHARYNGEAL DYSPHAGIA: Primary | ICD-10-CM

## 2024-08-16 NOTE — PROGRESS NOTES
Speech Language Pathology Treatment Note  [address] [place] [address]    Patient: Fortino Dennis                                                                                     Visit Date: 2024  :     1950    Referring practitioner:    Alli Card Jr*  Date of Initial Visit:          Type: THERAPY  Noted: 2024    Patient seen for 20 sessions    Visit Diagnoses:    ICD-10-CM ICD-9-CM   1. Oropharyngeal dysphagia  R13.12 787.22   2. Trismus  R25.2 781.0         SUBJECTIVE     Patient arrived alert and ready for therapy.     OBJECTIVE   GOALS  Goals                                            STG  Comments Status   Patient will improve oral skills to enhance safety and increase eating efficiency and bolus control as measured by increased lip closure, decreased anterior loss of bolus, improved bolus formation, improved posterior propulsion of the bolus, decreased drooling, and increased jaw ROM with decreased jaw pain . Patient is reportedly completing oral exercises at home. He has not called the company back about his orastretch for trismus.      Previous: ROM scale opening at evaluation on 24 was 24mm. Previous 2023 was 29mm, 5mm reduction in ROM.  Ongoing   Patient will improve hyolaryngeal elevation, improve epiglottic inversion, improve laryngeal closure, increase base of the tongue strength and posterior pharyngeal wall excursion, and increase efficiency of cough to clear residue from the airway as measured by decreased overt signs and symptoms of aspiration and decreased penetration and aspiration on repeat instrumental swallow study, if applicable.  Completed swallow exercise with NMES (estim). Patient able to complete swallows for 30 min of estim at A2 (fibrotic) at level 23 today.  Ongoing   Patient will report decreased difficulty with swallowing and improved EAT-10 score. Initial eat 10 score  14, Mild. Patient reports no changes since last session.  Ongoing   Patient will improve oral hygiene to enhance safety and decrease risk of aspiration pneumonia Patient reports no changes since last session.     Ongoing   LTG        Patient will safely consume PO diet of 0 - Thin drinks and 4 - Puree foods to maintain nutrition/hydration without complications such as aspiration or pneumonia.  Patient is currently on PO diet. He c/o difficulty with coughing, extra effort, and difficulty eating around others.  Ongoing       Therapy Education/Self Care    Details: POC   Given Home exercises and education.    Progress: Reinforced   Education provided to:  Patient   Level of understanding Verbalized             Total Time of Visit:             45   mins         ASSESSMENT/PLAN     ASSESSMENT:  Patient was able to complete swallowing with estim for 30 min at A2 (fibrotic) at level 23 today. He reports no changes since last visit.     PLAN: Continue therapy and home exercises. Patient to see GI for PEG consult on 8/29    Progress Note Due Date:09/11/2024  Recertification Due Date: 11/11/2024    Signature:  Sosa Membreno CCC-SLP, [place] License #: [id number]  Electronically signed on 8/16/2024          [facility] [address] [address]  [phone]

## 2024-08-16 NOTE — PROGRESS NOTES
Physical Therapy Treatment Note  [address] [place] [address]    Patient: Fortino Dennis                                                 Visit Date: 2024  :     1950    Referring practitioner:    Alli Card Jr*  Date of Initial Visit:          Type: THERAPY  Noted: 2024    Patient seen for 6 sessions    Visit Diagnoses:    ICD-10-CM ICD-9-CM   1. Lymphedema  I89.0 457.1     SUBJECTIVE     Subjective: He states his joints are stiff today, his nose is a little stuffy today too.    PAIN: 0/10         OBJECTIVE     Objective    Lymphedema       Row Name 24 1500 24 1227          Subjective Pain    Able to rate subjective pain? yes  -AL --     Pre-Treatment Pain Level 0  -AL --        Manual Lymphatic Drainage    Manual Lymphatic Drainage -- initial sequence;head/neck treatment;opened regional lymph nodes  -AL     Initial Sequence -- full neck;abdomen;diaphragmatic breathing  -AL     Abdomen -- superficial  -AL     Diaphragmatic Breathing -- x 9 with abdomen  -AL     Opened Regional Lymph Nodes -- axillary  -AL     Axillary -- right;left  -AL     Head/Neck Treatment -- pre-auricular nodes;post-auricular nodes;occipital nodes;sublingual nodes;full/complex MLD  -AL     Manual Lymphatic Drainage Comments -- IASTM to the anterior neck using hand held massager. Scar tissue massage anterior neck  -AL     Manual Therapy -- 30  -AL               User Key  (r) = Recorded By, (t) = Taken By, (c) = Cosigned By      Initials Name Provider Type    AL Pastora Hamlin, PTA, CLT-JULIA Physical Therapist Assistant                      Therapeutic Exercises    24895 Units Comments   Stretched neck into B rotation and B sidebending.       Occipital release     B pec stretch               Timed Minutes 10        Therapy Education/Self Care 46031   Education offered today Look into purchasing a hand held massager and home traction  unit   Medbridge Code    Ongoing HEP   Work on MLD and HEP   Timed Minutes        Total Timed Treatment:     40   mins  Total Time of Visit:             40   mins         ASSESSMENT/PLAN     GOALS  Goals                                          Progress Note due by 9/8/24                                                      Recert due by 10/9/24   STG by: 4 weeks Comments Date Status   Patient will have a good basic understanding of lymphedema and its suggested risk reduction practices Continue to educate 8/9 Ongoing   Patient will be independent with remedial HEP for lymphedema  He is working on the HEP 8/9 Ongoing   Patient will be independent with self MLD for lymphedema He will look into purchasing a hand held massager. 8/16 Ongoing             LTG by: 8 Weeks         Patient will have appropriate compression garments for lymphedema maintenance He will order the neck compression garment.  8/9 Ongoing   Patient will have no sign or symptoms of infection  No open areas no weeping 8/9 Ongoing   Patient will be independent with comprehensive maintenance program for lymphedema  Working towards home maintenance program. Plans to get a massager 8/14 Ongoing                                      Assessment/Plan     ASSESSMENT: The scar tissue is slightly more mobile today, he is going to purchase a hand held massager to use at home.  Neck is still tight.       PLAN:   Continue with MLD    SIGNATURE: Pastora Hamlin PTA, BRIAN FARR License #: [id number]  Electronically Signed on 8/16/2024        [address] [place] [address] [place] [address]  [phone]

## 2024-08-21 ENCOUNTER — TREATMENT (OUTPATIENT)
Dept: PHYSICAL THERAPY | Facility: CLINIC | Age: 74
End: 2024-08-21
Payer: MEDICARE

## 2024-08-21 DIAGNOSIS — R13.12 OROPHARYNGEAL DYSPHAGIA: Primary | ICD-10-CM

## 2024-08-21 DIAGNOSIS — I89.0 LYMPHEDEMA: Primary | ICD-10-CM

## 2024-08-21 DIAGNOSIS — R25.2 TRISMUS: ICD-10-CM

## 2024-08-21 PROCEDURE — 92526 ORAL FUNCTION THERAPY: CPT | Performed by: SPEECH-LANGUAGE PATHOLOGIST

## 2024-08-21 PROCEDURE — 97110 THERAPEUTIC EXERCISES: CPT | Performed by: PHYSICAL THERAPIST

## 2024-08-21 PROCEDURE — 97140 MANUAL THERAPY 1/> REGIONS: CPT | Performed by: PHYSICAL THERAPIST

## 2024-08-21 NOTE — PROGRESS NOTES
"                                                                Physical Therapy Treatment Note  [address] [place] [address]    Patient: Fortino Dennis                                                 Visit Date: 2024  :     1950    Referring practitioner:    Alli Card Jr*  Date of Initial Visit:          Type: THERAPY  Noted: 2024    Patient seen for 7 sessions    Visit Diagnoses:    ICD-10-CM ICD-9-CM   1. Lymphedema  I89.0 457.1     SUBJECTIVE     Subjective: He says he is about \"played out\" today.     PAIN: 0/10         OBJECTIVE     Objective    Lymphedema       Row Name 24 1700             Subjective Pain    Able to rate subjective pain? yes  -HR      Pre-Treatment Pain Level 0  -HR         Manual Lymphatic Drainage    Manual Lymphatic Drainage initial sequence;head/neck treatment;opened regional lymph nodes  -HR      Initial Sequence full neck;abdomen;diaphragmatic breathing  -HR      Abdomen superficial  -HR      Opened Regional Lymph Nodes axillary  -HR      Axillary right;left  -HR      Head/Neck Treatment pre-auricular nodes;post-auricular nodes;occipital nodes;sublingual nodes;full/complex MLD  -HR      Manual Lymphatic Drainage Comments IASTM to the anterior neck using hand held massager. Scar tissue massage anterior neck  -HR      Manual Therapy 30  -HR                User Key  (r) = Recorded By, (t) = Taken By, (c) = Cosigned By      Initials Name Provider Type    HR Ellen Rosenbaum, PT, DPT, CLT-JULIA Physical Therapist                      Therapeutic Exercises    46818 Units Comments   Stretched neck into B rotation and B sidebending.       Occipital release     B pec stretch               Timed Minutes 10        Therapy Education/Self Care 09491   Education offered today Look into purchasing a hand held massager and home traction unit   Medbridge Code    Ongoing HEP   Work on MLD and HEP   Timed Minutes        Total Timed Treatment:     40   " mins  Total Time of Visit:             40   mins         ASSESSMENT/PLAN     GOALS  Goals                                          Progress Note due by 9/8/24                                                      Recert due by 10/9/24   STG by: 4 weeks Comments Date Status   Patient will have a good basic understanding of lymphedema and its suggested risk reduction practices Continue to educate 8/9 Ongoing   Patient will be independent with remedial HEP for lymphedema  He is working on the HEP 8/9 Ongoing   Patient will be independent with self MLD for lymphedema He will look into purchasing a hand held massager. 8/16 Ongoing             LTG by: 8 Weeks         Patient will have appropriate compression garments for lymphedema maintenance He will order the neck compression garment.  8/9 Ongoing   Patient will have no sign or symptoms of infection  No open areas no weeping 8/9 Ongoing   Patient will be independent with comprehensive maintenance program for lymphedema  Working towards home maintenance program. Plans to get a massager 8/14 Ongoing                                      Assessment/Plan     ASSESSMENT: The MLD feels good and the stretches are comfortable as well. He is so thin, it is difficult to make good skin contact especially at the SCF and shoulders.        PLAN:   Continue with MLD    SIGNATURE: Ellen Rosenbaum, PT, DPT, HELEN-BRIAN DUMONT License #: [id number]  Electronically Signed on 8/21/2024        [address] [place] [address] [place] [address]  [phone]

## 2024-08-21 NOTE — PROGRESS NOTES
Speech Language Pathology Treatment Note  [address] [place] [address]    Patient: Fortino Dennis                                                                                     Visit Date: 2024  :     1950    Referring practitioner:    Alli Card Jr*  Date of Initial Visit:          Type: THERAPY  Noted: 2024    Patient seen for 21 sessions    Visit Diagnoses:    ICD-10-CM ICD-9-CM   1. Oropharyngeal dysphagia  R13.12 787.22   2. Trismus  R25.2 781.0         SUBJECTIVE     Patient arrived alert and ready for therapy.     OBJECTIVE   GOALS  Goals                                            STG  Comments Status   Patient will improve oral skills to enhance safety and increase eating efficiency and bolus control as measured by increased lip closure, decreased anterior loss of bolus, improved bolus formation, improved posterior propulsion of the bolus, decreased drooling, and increased jaw ROM with decreased jaw pain . Patient is reportedly completing oral exercises at home. He has not called the company back about his orastretch for trismus.      Previous: ROM scale opening at evaluation on 24 was 24mm. Previous 2023 was 29mm, 5mm reduction in ROM.  Ongoing   Patient will improve hyolaryngeal elevation, improve epiglottic inversion, improve laryngeal closure, increase base of the tongue strength and posterior pharyngeal wall excursion, and increase efficiency of cough to clear residue from the airway as measured by decreased overt signs and symptoms of aspiration and decreased penetration and aspiration on repeat instrumental swallow study, if applicable.  Completed swallow exercise with NMES (estim). Patient able to complete swallows for 30 min of estim at A2 (fibrotic) at level 23.5 today.  Ongoing   Patient will report decreased difficulty with swallowing and improved EAT-10 score. Initial eat 10  score 14, Mild. Patient reports no changes since last session.  Ongoing   Patient will improve oral hygiene to enhance safety and decrease risk of aspiration pneumonia Patient reports no changes since last session.     Ongoing   LTG        Patient will safely consume PO diet of 0 - Thin drinks and 4 - Puree foods to maintain nutrition/hydration without complications such as aspiration or pneumonia.  Patient is currently on PO diet. He c/o difficulty with coughing, extra effort, and difficulty eating around others.  Ongoing       Therapy Education/Self Care    Details: POC   Given Home exercises and education.    Progress: Reinforced   Education provided to:  Patient   Level of understanding Verbalized         CPT 87044  Total Time of Visit:             45   mins         ASSESSMENT/PLAN     ASSESSMENT:  Patient was able to complete swallowing with estim for 30 min at A2 (fibrotic) at level 23.5 today. He reports no changes since last visit.     PLAN: Continue therapy and home exercises. Patient to see GI for PEG consult on 8/29    Progress Note Due Date:09/11/2024  Recertification Due Date: 11/11/2024    Signature:  Sosa Membreno CCC-SLP, [place] License #: [id number]  Electronically signed on 8/21/2024          Clarence Penny. [id number]  [phone]

## 2024-08-23 ENCOUNTER — TREATMENT (OUTPATIENT)
Dept: PHYSICAL THERAPY | Facility: CLINIC | Age: 74
End: 2024-08-23
Payer: MEDICARE

## 2024-08-23 DIAGNOSIS — R25.2 TRISMUS: ICD-10-CM

## 2024-08-23 DIAGNOSIS — I89.0 LYMPHEDEMA: Primary | ICD-10-CM

## 2024-08-23 DIAGNOSIS — R13.12 OROPHARYNGEAL DYSPHAGIA: Primary | ICD-10-CM

## 2024-08-23 PROCEDURE — 97140 MANUAL THERAPY 1/> REGIONS: CPT | Performed by: PHYSICAL THERAPIST

## 2024-08-23 PROCEDURE — 92526 ORAL FUNCTION THERAPY: CPT | Performed by: SPEECH-LANGUAGE PATHOLOGIST

## 2024-08-23 PROCEDURE — 97110 THERAPEUTIC EXERCISES: CPT | Performed by: PHYSICAL THERAPIST

## 2024-08-23 NOTE — PROGRESS NOTES
Speech Language Pathology Treatment Note  [address] [place] [address]    Patient: Fortino Dennis                                                                                     Visit Date: 2024  :     1950    Referring practitioner:    Alli Card Jr*  Date of Initial Visit:          Type: THERAPY  Noted: 2024    Patient seen for 22 sessions    Visit Diagnoses:  No diagnosis found.        SUBJECTIVE     Patient arrived alert and ready for therapy.     OBJECTIVE   GOALS  Goals                                            STG  Comments Status   Patient will improve oral skills to enhance safety and increase eating efficiency and bolus control as measured by increased lip closure, decreased anterior loss of bolus, improved bolus formation, improved posterior propulsion of the bolus, decreased drooling, and increased jaw ROM with decreased jaw pain . Patient is reportedly completing oral exercises at home. He has not yet called the company back about his orastretch for trismus.      Previous: ROM scale opening at evaluation on 24 was 24mm. Previous 2023 was 29mm, 5mm reduction in ROM.  Ongoing   Patient will improve hyolaryngeal elevation, improve epiglottic inversion, improve laryngeal closure, increase base of the tongue strength and posterior pharyngeal wall excursion, and increase efficiency of cough to clear residue from the airway as measured by decreased overt signs and symptoms of aspiration and decreased penetration and aspiration on repeat instrumental swallow study, if applicable.  Completed swallow exercise with NMES (estim). Patient able to complete swallows for 30 min of estim at A2 (fibrotic) at level 23.5 today. He continues to feel that the estim is not strong enough, despite being at a very high setting (usual setting at A2 for fibrotic tissue is 15-18).  Ongoing   Patient will report  decreased difficulty with swallowing and improved EAT-10 score. Initial eat 10 score 14, Mild. Patient reports no changes since last session.  Ongoing   Patient will improve oral hygiene to enhance safety and decrease risk of aspiration pneumonia Patient reports no changes since last session.     Ongoing   LTG        Patient will safely consume PO diet of 0 - Thin drinks and 4 - Puree foods to maintain nutrition/hydration without complications such as aspiration or pneumonia.  Patient is currently only eating very soft or liquidized foods on his PO diet. He has GI consult next week for PEG.   Ongoing       Therapy Education/Self Care    Details: POC   Given Home exercises and education.    Progress: Reinforced   Education provided to:  Patient   Level of understanding Verbalized         CPT 58737  Total Time of Visit:             45   mins         ASSESSMENT/PLAN     ASSESSMENT:  Patient was able to complete swallowing with estim for 30 min at A2 (fibrotic) at level 23.5 today. He reports no changes since last visit.     PLAN: Continue therapy and home exercises. Patient to see GI for PEG consult on 8/29    Progress Note Due Date:09/11/2024  Recertification Due Date: 11/11/2024    Signature:  Sosa Membreno CCC-SLP, [place] License #: [id number]  Electronically signed on 8/23/2024          Cla[place] Penny. [id number]  [phone]

## 2024-08-23 NOTE — PROGRESS NOTES
Physical Therapy Treatment Note  [address] [place] [address]    Patient: Fortino Dennis                                                 Visit Date: 2024  :     1950    Referring practitioner:    Alli Card Jr*  Date of Initial Visit:          Type: THERAPY  Noted: 2024    Patient seen for 8 sessions    Visit Diagnoses:    ICD-10-CM ICD-9-CM   1. Lymphedema  I89.0 457.1     SUBJECTIVE     Subjective: He states he feels ok.  He will talk to his DrSp Next week about getting a feeding tube as he knows he is not eating enough.    PAIN: 0/10         OBJECTIVE     Objective    Lymphedema       Row Name 24 1409             Subjective Pain    Able to rate subjective pain? yes  -AL      Pre-Treatment Pain Level 0  -AL         Manual Lymphatic Drainage    Manual Lymphatic Drainage initial sequence;head/neck treatment;opened regional lymph nodes  -AL      Initial Sequence full neck;abdomen;diaphragmatic breathing  -AL      Abdomen superficial  -AL      Diaphragmatic Breathing x 9 with abdomen  -AL      Opened Regional Lymph Nodes axillary  -AL      Axillary right;left  -AL      Head/Neck Treatment pre-auricular nodes;post-auricular nodes;occipital nodes;sublingual nodes;full/complex MLD  -AL      Manual Lymphatic Drainage Comments IASTM to the anterior neck using hand held massager. Scar tissue massage anterior neck  -AL      Manual Therapy 35  -AL                User Key  (r) = Recorded By, (t) = Taken By, (c) = Cosigned By      Initials Name Provider Type    AL Pastora Hamlin PTA, CLT-JULIA Physical Therapist Assistant                        Therapeutic Exercises    08286 Units Comments   Stretched neck into B rotation and B sidebending.       Occipital release     B pec stretch               Timed Minutes 10        Therapy Education/Self Care [id number]   Education offered today Look into purchasing a hand held massager and  home traction unit   Med[employer] Code    Ongoing HEP   Work on MLD and HEP   Timed Minutes        Total Timed Treatment:     45   mins  Total Time of Visit:             45   mins         ASSESSMENT/PLAN     GOALS  Goals                                          Progress Note due by 9/8/24                                                      Recert due by 10/9/24   STG by: 4 weeks Comments Date Status   Patient will have a good basic understanding of lymphedema and its suggested risk reduction practices Continue to educate 8/9 Ongoing   Patient will be independent with remedial HEP for lymphedema  He is working on the HEP 8/9 Ongoing   Patient will be independent with self MLD for lymphedema He needs to turn the [employer] paperwork in. 8/23 Ongoing             LTG by: 8 Weeks         Patient will have appropriate compression garments for lymphedema maintenance He will order the neck compression garment.  8/9 Ongoing   Patient will have no sign or symptoms of infection  No open areas no weeping 8/9 Ongoing   Patient will be independent with comprehensive maintenance program for lymphedema He has a hand held massager he will start using at home. 8/23 Ongoing                                      Assessment/Plan     ASSESSMENT: The dense tissue anterior neck was much softer today and the scars more mobile.  Patient could tell a difference in the way the tissue looked and felt.  He now has a hand held massager he will use at home.  He will bring the paperwork for the Flexit[employer] pump next treatment.       PLAN:   Continue with MLD    SIGNATURE: Pastora Hamlin PTA, [facility] [place] License #: [id number]  Electronically Signed on 8/23/2024        [address] [place] [address]. [address]  [phone]

## 2024-08-28 ENCOUNTER — HOSPITAL ENCOUNTER (OUTPATIENT)
Dept: PHYSICAL THERAPY | Facility: HOSPITAL | Age: 74
Setting detail: THERAPIES SERIES
Discharge: HOME | End: 2024-08-28
Payer: MEDICARE

## 2024-08-28 ENCOUNTER — HOSPITAL ENCOUNTER (OUTPATIENT)
Facility: HOSPITAL | Age: 74
Setting detail: THERAPIES SERIES
Discharge: HOME | End: 2024-08-28
Payer: MEDICARE

## 2024-08-28 DIAGNOSIS — R13.12 OROPHARYNGEAL DYSPHAGIA: Primary | ICD-10-CM

## 2024-08-28 DIAGNOSIS — I89.0 LYMPHEDEMA: Primary | ICD-10-CM

## 2024-08-28 PROCEDURE — 92526 ORAL FUNCTION THERAPY: CPT | Performed by: SPEECH-LANGUAGE PATHOLOGIST

## 2024-08-28 PROCEDURE — 97140 MANUAL THERAPY 1/> REGIONS: CPT | Performed by: PHYSICAL THERAPIST

## 2024-08-28 PROCEDURE — 97110 THERAPEUTIC EXERCISES: CPT | Performed by: PHYSICAL THERAPIST

## 2024-08-28 NOTE — PROGRESS NOTES
"                                                                  Speech Language Pathology Treatment Note  [address] [place] [address]    Patient: Fortino Dennis                                                                                     Visit Date: 2024  :     1950    Referring practitioner:    Alli Card Jr*  Date of Initial Visit:        2024    Visit Diagnoses:    ICD-10-CM ICD-9-CM   1. Oropharyngeal dysphagia  R13.12 787.22           SUBJECTIVE     Patient arrived alert and ready for therapy.     OBJECTIVE   GOALS  Goals                                            STG  Comments Status   Patient will improve oral skills to enhance safety and increase eating efficiency and bolus control as measured by increased lip closure, decreased anterior loss of bolus, improved bolus formation, improved posterior propulsion of the bolus, decreased drooling, and increased jaw ROM with decreased jaw pain . Patient is reportedly completing oral exercises at home. He has not yet called the company back about his orastretch for trismus.      Previous: ROM scale opening at evaluation on 24 was 24mm. Previous 2023 was 29mm, 5mm reduction in ROM.  Ongoing   Patient will improve hyolaryngeal elevation, improve epiglottic inversion, improve laryngeal closure, increase base of the tongue strength and posterior pharyngeal wall excursion, and increase efficiency of cough to clear residue from the airway as measured by decreased overt signs and symptoms of aspiration and decreased penetration and aspiration on repeat instrumental swallow study, if applicable.  Completed swallow exercise with NMES (estim). Patient able to complete swallows for 30 min of estim at A2 (fibrotic) at level 23 today. He continues to feel that the estim is not strong enough, despite being at a very high setting (usual setting at A2 for fibrotic tissue is 15-18). He stated he needed it \"lower\" will adjust " electrodes next visit. He does not note any improvement in his swallowing.  Ongoing   Patient will report decreased difficulty with swallowing and improved EAT-10 score. Initial eat 10 score 14, Mild. Patient reports no changes since last session.  Ongoing   Patient will improve oral hygiene to enhance safety and decrease risk of aspiration pneumonia Patient reports no changes since last session.     Ongoing   LTG        Patient will safely consume PO diet of 0 - Thin drinks and 4 - Puree foods to maintain nutrition/hydration without complications such as aspiration or pneumonia.  Patient is currently only eating very soft or liquidized foods on his PO diet. He has GI consult tomorrow for PEG.   Ongoing       Therapy Education/Self Care    Details: POC   Given Home exercises and education.    Progress: Reinforced   Education provided to:  Patient   Level of understanding Verbalized         CPT 40073  Total Time of Visit:             40   mins         ASSESSMENT/PLAN     ASSESSMENT:  Patient was able to complete swallowing with estim for 30 min at A2 (fibrotic) at level 23 today. He reports no changes since last visit. He does not note any improvement in his swallowing.     PLAN: Continue therapy and home exercises. Patient to see GI for PEG consult on 8/29    Progress Note Due Date:09/11/2024  Recertification Due Date: 11/11/2024    Signature:  Sosa Membreno, CCC-SLP, [place] License #: [id number]  Electronically signed on 8/28/2024          Cla[place] Penny. [id number]  [phone]

## 2024-08-28 NOTE — THERAPY TREATMENT NOTE
Physical Therapy Treatment Note  [address] [place] [address]    Patient: Fortino Dennis                                                 Visit Date: 2024  :     1950    Referring practitioner:    Alli Card Jr*  Date of Initial Visit:          Type: THERAPY  Noted: 2024        Visit Diagnoses:    ICD-10-CM ICD-9-CM   1. Lymphedema  I89.0 457.1     SUBJECTIVE     Subjective:  He has been using the massager.     PAIN: 0/10         OBJECTIVE     Objective    Lymphedema       Row Name 24 1400             Subjective Pain    Able to rate subjective pain? yes  -HR      Pre-Treatment Pain Level 0  -HR         Manual Lymphatic Drainage    Manual Lymphatic Drainage initial sequence;head/neck treatment;opened regional lymph nodes  -HR      Initial Sequence full neck;abdomen;diaphragmatic breathing  -HR      Abdomen superficial  -HR      Opened Regional Lymph Nodes axillary  -HR      Axillary right;left  -HR      Head/Neck Treatment pre-auricular nodes;post-auricular nodes;occipital nodes;sublingual nodes;full/complex MLD  -HR      Manual Lymphatic Drainage Comments IASTM with hand held massager to the back of the neck and shoulders  -HR      Manual Therapy 35  -HR                User Key  (r) = Recorded By, (t) = Taken By, (c) = Cosigned By      Initials Name Provider Type    HR Ellen Rosenbaum, PT, DPT, CLT-JULIA Physical Therapist                     Therapeutic Exercises    90067 Units Comments   Stretched neck into B rotation and B sidebending.         Occipital release       B pec stretch                       Timed Minutes 10        Therapy Education/Self Care [id number]   Education offered today    [employer]br[employer] Code    Ongoing HEP   Cont self massage and stretches   Timed Minutes        Total Timed Treatment:     45   mins  Total Time of Visit:             45   mins         ASSESSMENT/PLAN     GOALS          Goals                                           Progress Note due by 9/8/24                                                      Recert due by 10/9/24   STG by: 4 weeks Comments Date Status   Patient will have a good basic understanding of lymphedema and its suggested risk reduction practices Continue to educate 8/9 Ongoing   Patient will be independent with remedial HEP for lymphedema  He is working on the HEP 8/9 Ongoing   Patient will be independent with self MLD for lymphedema He needs to turn the [email or web address] paperwork in. 8/23 Ongoing             LTG by: 8 Weeks         Patient will have appropriate compression garments for lymphedema maintenance He will order the neck compression garment.  8/9 Ongoing   Patient will have no sign or symptoms of infection  No open areas no weeping 8/9 Ongoing   Patient will be independent with comprehensive maintenance program for lymphedema He has a hand held massager he will start using at home. 8/23 Ongoing                                   Assessment/Plan     ASSESSMENT:   He feels better after treatment. He has been using the massager at home. I used it more to the posterior neck and shoulders since this is difficult for him to reach at home.     PLAN:   Cont POC    SIGNATURE: Ellen Rosenabum, PT, DPT, [facility] License #: [id number]  Electronically Signed on 8/28/2024        [address] [place] [address] aLvinia  [place] [address]  [phone]

## 2024-08-29 ENCOUNTER — OFFICE VISIT (OUTPATIENT)
Dept: GASTROENTEROLOGY | Facility: CLINIC | Age: 74
End: 2024-08-29
Payer: MEDICARE

## 2024-08-29 VITALS
OXYGEN SATURATION: 97 % | TEMPERATURE: 97.4 F | DIASTOLIC BLOOD PRESSURE: 64 MMHG | HEART RATE: 96 BPM | SYSTOLIC BLOOD PRESSURE: 122 MMHG | BODY MASS INDEX: 18.02 KG/M2 | HEIGHT: 73 IN | WEIGHT: 136 LBS

## 2024-08-29 DIAGNOSIS — R13.12 OROPHARYNGEAL DYSPHAGIA: Primary | ICD-10-CM

## 2024-08-29 DIAGNOSIS — R63.4 WEIGHT LOSS: ICD-10-CM

## 2024-08-29 NOTE — PROGRESS NOTES
"Chief Complaint   Patient presents with    GI Problem     C/O trouble swallowing, hx of radiation therapy neck 2007       PCP: Fortino Sierra DO  REFER: Fortino Sierra DO    Subjective     HPI           G tube  fell out 6-7 months ago and the pt feels like he just can't keep up  Has lost 10 lbs in the last 6 months  Has to essentially \"mix\" up  everything he eats currently   Denies abdominal pain           Past Medical History:   Diagnosis Date    Acquired hypothyroidism     Anxiety     Arthritis     Atrial fibrillation     Dental disease     Depression     Essential hypertension     GERD (gastroesophageal reflux disease)     Headache 2010 first noticed    Hiatal hernia     Hyperlipidemia     Hypertension     Memory problem     aricept    Mixed hyperlipidemia     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    Sleep apnea     Throat cancer     Thyroid disorder     Tinnitus 2020    TMJ dysfunction 2020 wear        Past Surgical History:   Procedure Laterality Date    CATARACT EXTRACTION WITH INTRAOCULAR LENS IMPLANT Bilateral     pt states x3 surgeries    GTUBE INSERTION      LARYNGOSCOPY Bilateral 07/07/2023    Procedure: MICRODIRECT LARYNGOSCOPY;  Surgeon: Alli Card Jr., MD;  Location: [facility] OR;  Service: ENT;  Laterality: Bilateral;    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    MANDIBLE SURGERY  2021    removal of necrotic jaw (part of 1 of 2 surgeries)    MASTECTOMY Left     removal of breast due to benign growth    PANENDOSCOPY Bilateral 07/07/2023    Procedure: DIRECT LARYNGOSCOPY, ESOPHAGOSCOPY, BRONCHOSCOPY, MICRODIRECT LARYNGOSCOPY,OR ESOPHAGEAL DILATATION 38-48;  Surgeon: Alli Card Jr., MD;  Location:  [facility] OR;  Service: ENT;  Laterality: Bilateral;    THROAT SURGERY  2007    cancerous mass removed       Outpatient Medications Marked as Taking for the 8/29/24 encounter (Office Visit) with Jc Vargas DO   Medication Sig Dispense Refill " "   Acetaminophen (TYLENOL ARTHRITIS PAIN PO) Take 0.5 tablets by mouth Daily As Needed.      atorvastatin (LIPITOR) 20 MG tablet Take 1 tablet by mouth Daily. 90 tablet 3    donepezil (ARICEPT) 5 MG tablet Take 1 tablet by mouth Every Night. 90 tablet 1    levothyroxine (SYNTHROID, LEVOTHROID) 50 MCG tablet Take 1 tablet by mouth Daily.      nystatin (MYCOSTATIN) 100,000 unit/mL suspension Swish and swallow 5 mL 4 (Four) Times a Day. 473 mL 1    Restasis 0.05 % ophthalmic emulsion Administer 1 drop to both eyes 2 (Two) Times a Day.      rivaroxaban (XARELTO) 20 MG tablet Take 1 tablet by mouth Daily. 90 tablet 1    SENNA PO Take 1 capsule by mouth Daily.      venlafaxine XR (Effexor XR) 150 MG 24 hr capsule Take 1 capsule by mouth Daily. 90 capsule 1    zolpidem (AMBIEN) 10 MG tablet Take 1 tablet by mouth At Night As Needed for Sleep. 30 tablet 1       No Known Allergies    Social History     Socioeconomic History    Marital status: Single   Tobacco Use    Smoking status: Never     Passive exposure: Never    Smokeless tobacco: Never    Tobacco comments:     Zero -- none   Vaping Use    Vaping status: Never Used   Substance and Sexual Activity    Alcohol use: Yes     Alcohol/week: 3.0 standard drinks of alcohol     Types: 3 Cans of beer per week     Comment: daily beers    Drug use: Never    Sexual activity: Not Currently     Partners: Female       Review of Systems   Constitutional:  Negative for fever and unexpected weight change.   HENT:  Negative for trouble swallowing.    Respiratory:  Negative for shortness of breath.    Cardiovascular:  Negative for chest pain.   Gastrointestinal:  Negative for abdominal pain and anal bleeding.       Objective     Vitals:    08/29/24 1251   BP: 122/64   Pulse: 96   Temp: 97.4 °F (36.3 °C)   SpO2: 97%   Weight: 61.7 kg (136 lb)   Height: 185.4 cm (73\")     Body mass index is 17.94 kg/m².    Physical Exam  Constitutional:       Appearance: Normal appearance. He is " well-developed.   Eyes:      General: No scleral icterus.  Cardiovascular:      Heart sounds: Normal heart sounds. No murmur heard.  Pulmonary:      Effort: Pulmonary effort is normal.   Abdominal:      General: Bowel sounds are normal. There is no distension.      Palpations: Abdomen is soft.      Tenderness: There is no abdominal tenderness. There is no guarding.   Skin:     General: Skin is warm and dry.      Coloration: Skin is not jaundiced.   Neurological:      Mental Status: He is alert.   Psychiatric:         Behavior: Behavior is cooperative.         Imaging Results (Most Recent)       None            Body mass index is 17.94 kg/m².    Assessment & Plan     Diagnoses and all orders for this visit:    1. Oropharyngeal dysphagia (Primary)  -     Ambulatory Referral to General Surgery    2. Weight loss      Refer to General surg as I think an EGD assisted Peg tube might be most difficult      * Surgery not found *        Advised pt to stop use of NSAIDs, Fish Oil, and MV 5 days prior to procedure, per Dr Vargas protocol.  Tylenol based products are ok to take.  Pt verbalized understanding.        Jc Vargas, DO  08/29/24          There are no Patient Instructions on file for this visit.

## 2024-08-30 ENCOUNTER — HOSPITAL ENCOUNTER (OUTPATIENT)
Facility: HOSPITAL | Age: 74
Discharge: HOME | End: 2024-08-30
Payer: MEDICARE

## 2024-08-30 ENCOUNTER — HOSPITAL ENCOUNTER (OUTPATIENT)
Dept: PHYSICAL THERAPY | Facility: HOSPITAL | Age: 74
Setting detail: THERAPIES SERIES
Discharge: HOME | End: 2024-08-30
Payer: MEDICARE

## 2024-08-30 DIAGNOSIS — R13.12 OROPHARYNGEAL DYSPHAGIA: Primary | ICD-10-CM

## 2024-08-30 DIAGNOSIS — I89.0 LYMPHEDEMA: Primary | ICD-10-CM

## 2024-08-30 PROCEDURE — 97110 THERAPEUTIC EXERCISES: CPT | Performed by: PHYSICAL THERAPIST

## 2024-08-30 PROCEDURE — 97140 MANUAL THERAPY 1/> REGIONS: CPT | Performed by: PHYSICAL THERAPIST

## 2024-08-30 PROCEDURE — 92526 ORAL FUNCTION THERAPY: CPT | Performed by: SPEECH-LANGUAGE PATHOLOGIST

## 2024-08-30 NOTE — PROGRESS NOTES
Speech Language Pathology Treatment Note  [address] [place] [address]    Patient: Fortino Dennis                                                                                     Visit Date: 2024  :     1950    Referring practitioner:    Alli Card Jr*  Date of Initial Visit:        2024    Visit Diagnoses:    ICD-10-CM ICD-9-CM   1. Oropharyngeal dysphagia  R13.12 787.22           SUBJECTIVE     Patient arrived alert and ready for therapy. He reports that he has seen Dr. Vargas and has been referred to surgery for his PEG tube placement.     OBJECTIVE   GOALS  Goals                                            STG  Comments Status   Patient will improve oral skills to enhance safety and increase eating efficiency and bolus control as measured by increased lip closure, decreased anterior loss of bolus, improved bolus formation, improved posterior propulsion of the bolus, decreased drooling, and increased jaw ROM with decreased jaw pain . Patient is reportedly completing oral exercises at home. He has not yet called the company back about his orastretch for trismus.      Previous: ROM scale opening at evaluation on 24 was 24mm. Previous 2023 was 29mm, 5mm reduction in ROM.  Ongoing   Patient will improve hyolaryngeal elevation, improve epiglottic inversion, improve laryngeal closure, increase base of the tongue strength and posterior pharyngeal wall excursion, and increase efficiency of cough to clear residue from the airway as measured by decreased overt signs and symptoms of aspiration and decreased penetration and aspiration on repeat instrumental swallow study, if applicable.  Completed swallow exercise with NMES (estim) submental and bilateral. Patient felt that this placement was preferable. Completed 30 min with effortful swallows at level 20.  Ongoing   Patient will report decreased difficulty  with swallowing and improved EAT-10 score. Initial eat 10 score 14, Mild. Patient reports no changes since last session.  Ongoing   Patient will improve oral hygiene to enhance safety and decrease risk of aspiration pneumonia Patient reports no changes since last session.     Ongoing   LTG        Patient will safely consume PO diet of 0 - Thin drinks and 4 - Puree foods to maintain nutrition/hydration without complications such as aspiration or pneumonia.  Patient is currently only eating very soft or liquidized foods on his PO diet.   Ongoing       Therapy Education/Self Care    Details: POC   Given Home exercises and education.    Progress: Reinforced   Education provided to:  Patient   Level of understanding Verbalized         CPT 53843  Total Time of Visit:             40   mins         ASSESSMENT/PLAN     ASSESSMENT:  Patient was able to complete swallowing with estim for 30 min at A2 (fibrotic) at level 23 today. He reports no changes since last visit. He does not note any improvement in his swallowing.     PLAN: Continue therapy and home exercises. Patient referred to surgery for PEG placement.     Progress Note Due Date:09/11/2024  Recertification Due Date: 11/11/2024    Signature:  Sosa Membreno CCC-SLP, [place] License #: [id number]  Electronically signed on 8/30/2024          [place] [address]  [phone]

## 2024-09-01 NOTE — THERAPY TREATMENT NOTE
Physical Therapy Treatment Note  [address] [place] [address]    Patient: Fortino Dennis                                                 Visit Date: 2024  :     1950    Referring practitioner:    Alli Card Jr*  Date of Initial Visit:          Type: THERAPY  Noted: 2024        Visit Diagnoses:    ICD-10-CM ICD-9-CM   1. Lymphedema  I89.0 457.1     SUBJECTIVE     Subjective:  He hasn't filled out the paper yet for the pump.     PAIN: 0/10         OBJECTIVE     Objective      24 1200   Subjective Pain   Able to rate subjective pain? yes   Pre-Treatment Pain Level 0   Manual Lymphatic Drainage   Manual Lymphatic Drainage initial sequence;head/neck treatment;opened regional lymph nodes   Initial Sequence full neck;abdomen;diaphragmatic breathing   Abdomen superficial   Opened Regional Lymph Nodes axillary   Axillary right;left   Head/Neck Treatment pre-auricular nodes;post-auricular nodes;occipital nodes;sublingual nodes;full/complex MLD   Full/Complex MLD spent extra time to the posterior neck and the upper shoulders where muscle tissue is so tight and fibrotic   Manual Lymphatic Drainage Comments IASTM with the hand held massager to knots in the upper traps   Manual Therapy 35          Therapeutic Exercises    36145 Units Comments   Stretched neck into B rotation and B sidebending.         Occipital release       B pec stretch                       Timed Minutes 10        Therapy Education/Self Care [id number]   Education offered today Explained why he has to fill out the paperwork with [employer] and get a donation pump for the head and neck device.    Medbride Code    Ongoing HEP   Cont self massage and stretches   Timed Minutes        Total Timed Treatment:     45   mins  Total Time of Visit:             45   mins         ASSESSMENT/PLAN     GOALS          Goals                                          Progress  Note due by 9/8/24                                                      Recert due by 10/9/24   STG by: 4 weeks Comments Date Status   Patient will have a good basic understanding of lymphedema and its suggested risk reduction practices Continue to educate 8/9 Ongoing   Patient will be independent with remedial HEP for lymphedema  He is working on the HEP 8/9 Ongoing   Patient will be independent with self MLD for lymphedema He needs to turn the [employer] paperwork in. 8/23 Ongoing             LTG by: 8 Weeks         Patient will have appropriate compression garments for lymphedema maintenance He will order the neck compression garment.  8/9 Ongoing   Patient will have no sign or symptoms of infection  No open areas no weeping 8/9 Ongoing   Patient will be independent with comprehensive maintenance program for lymphedema He has a hand held massager he will start using at home. 8/23 Ongoing                                   Assessment/Plan     ASSESSMENT:   Patient is so thin, it is difficult to keep good skin contact with MLD. I have to change hand positions in order to make good contact. This has been true since his initial evaluation. I explained to him about Medicare not currently covering the head and neck lymphedema pump, so that is why he has been asked to complete the paperwork for a donation. A study is currently being concluded and will be presented as evidence that the head and neck device is beneficial to Medicare. Hopefully he will get this soon as he did show a bit more buy in after I talked with him about it.    PLAN:   Cont POC    SIGNATURE: Ellen Rosenbaum, PT, DPT, [place] [place] License #: [id number]  Electronically Signed on 9/1/2024        [address] [place] [address] [place] [address]  [phone]

## 2024-09-04 ENCOUNTER — HOSPITAL ENCOUNTER (OUTPATIENT)
Facility: HOSPITAL | Age: 74
Discharge: HOME | End: 2024-09-04
Admitting: OTOLARYNGOLOGY
Payer: MEDICARE

## 2024-09-04 ENCOUNTER — HOSPITAL ENCOUNTER (OUTPATIENT)
Dept: PHYSICAL THERAPY | Facility: HOSPITAL | Age: 74
Setting detail: THERAPIES SERIES
Discharge: HOME | End: 2024-09-04
Payer: MEDICARE

## 2024-09-04 DIAGNOSIS — I89.0 LYMPHEDEMA: Primary | ICD-10-CM

## 2024-09-04 DIAGNOSIS — R13.12 OROPHARYNGEAL DYSPHAGIA: Primary | ICD-10-CM

## 2024-09-04 PROCEDURE — 97140 MANUAL THERAPY 1/> REGIONS: CPT

## 2024-09-04 PROCEDURE — 92526 ORAL FUNCTION THERAPY: CPT | Performed by: SPEECH-LANGUAGE PATHOLOGIST

## 2024-09-04 NOTE — PROGRESS NOTES
Speech Language Pathology Treatment Note  [address] [place] [address]    Patient: Fortino Dennis                                                                                     Visit Date: 2024  :     1950    Referring practitioner:    Alli Card Jr*  Date of Initial Visit:        2024    Visit Diagnoses:    ICD-10-CM ICD-9-CM   1. Oropharyngeal dysphagia  R13.12 787.22           SUBJECTIVE     Patient arrived alert and ready for therapy.     OBJECTIVE   GOALS  Goals                                            STG  Comments Status   Patient will improve oral skills to enhance safety and increase eating efficiency and bolus control as measured by increased lip closure, decreased anterior loss of bolus, improved bolus formation, improved posterior propulsion of the bolus, decreased drooling, and increased jaw ROM with decreased jaw pain . Patient is reportedly completing oral exercises at home. He has not yet called the company back about his orastretch for trismus.      Previous: ROM scale opening at evaluation on 24 was 24mm. Previous 2023 was 29mm, 5mm reduction in ROM.  Ongoing   Patient will improve hyolaryngeal elevation, improve epiglottic inversion, improve laryngeal closure, increase base of the tongue strength and posterior pharyngeal wall excursion, and increase efficiency of cough to clear residue from the airway as measured by decreased overt signs and symptoms of aspiration and decreased penetration and aspiration on repeat instrumental swallow study, if applicable.  Completed swallow exercise with NMES (estim) submental and bilateral. Completed 30 min with effortful swallows at level 16, which patient felt was sufficient.  Ongoing   Patient will report decreased difficulty with swallowing and improved EAT-10 score. Initial eat 10 score 14, Mild. Patient reported that he thinks his throat  "\"feels a little better\" after the last session.   Ongoing   Patient will improve oral hygiene to enhance safety and decrease risk of aspiration pneumonia Patient reports no changes since last session.     Ongoing   LTG        Patient will safely consume PO diet of 0 - Thin drinks and 4 - Puree foods to maintain nutrition/hydration without complications such as aspiration or pneumonia.  Patient is currently only eating very soft or liquidized foods on his PO diet. He is scheduled for consult with general surgery for PEG tube.   Ongoing       Therapy Education/Self Care    Details: POC   Given Home exercises and education.    Progress: Reinforced   Education provided to:  Patient   Level of understanding Verbalized         CPT 47377  Total Time of Visit:             40   mins         ASSESSMENT/PLAN     ASSESSMENT:  Patient was able to complete swallowing with estim for 30 min with quad electrode placement at level 16 today.  He reports that his throat 'felt better' since last treatment with no change in swallowing function.     PLAN: Continue therapy and home exercises.     Progress Note Due Date:09/11/2024  Recertification Due Date: 11/11/2024    Signature:  Sosa Membreno CCC-SLP, [place] License #: [id number]  Electronically signed on 9/4/2024          [place] [address]  [phone]      "

## 2024-09-04 NOTE — THERAPY TREATMENT NOTE
Physical Therapy Treatment Note  [address] [place] [address]    Patient: Fortino Dennis                                                 Visit Date: 2024  :     1950    Referring practitioner:    Alli Card Jr*  Date of Initial Visit:          Type: THERAPY  Noted: 2024        Visit Diagnoses:    ICD-10-CM ICD-9-CM   1. Lymphedema  I89.0 457.1     SUBJECTIVE     Subjective:  He hasn't filled out the paper yet for the pump.  He will see the DrSp About getting a feeding tube Monday.     PAIN: 0/10         OBJECTIVE     Objective    Lymphedema       Row Name 24 1230             Subjective Pain    Able to rate subjective pain? yes  -AL      Pre-Treatment Pain Level 0  -AL         Manual Lymphatic Drainage    Manual Lymphatic Drainage initial sequence;head/neck treatment;opened regional lymph nodes  -AL      Initial Sequence full neck;abdomen;diaphragmatic breathing  -AL      Abdomen superficial  -AL      Diaphragmatic Breathing x 9 with abdomen  -AL      Opened Regional Lymph Nodes axillary  -AL      Axillary right;left  -AL      Head/Neck Treatment pre-auricular nodes;post-auricular nodes;occipital nodes;sublingual nodes;full/complex MLD  -AL      Manual Lymphatic Drainage Comments IASTM using the hand held massager to the anterior neck  -AL      Manual Therapy 43  -AL                User Key  (r) = Recorded By, (t) = Taken By, (c) = Cosigned By      Initials Name Provider Type    AL Pastora Hamlin PTA, CHIKA Physical Therapist Assistant                              Therapy Education/Self Care [id number]   Education offered today Explained why he has to fill out the paperwork with [employer] and get a donation pump for the head and neck device.    Medbridge Code    Ongoing HEP   Cont self massage and stretches   Timed Minutes        Total Timed Treatment:     43   mins  Total Time of Visit:             43   mins          ASSESSMENT/PLAN     GOALS          Goals                                          Progress Note due by 9/8/24                                                      Recert due by 10/9/24   STG by: 4 weeks Comments Date Status   Patient will have a good basic understanding of lymphedema and its suggested risk reduction practices Continue to educate 8/9 Ongoing   Patient will be independent with remedial HEP for lymphedema  He is working on the HEP 8/9 Ongoing   Patient will be independent with self MLD for lymphedema He needs to turn the [employer] paperwork in. 8/23 Ongoing             LTG by: 8 Weeks         Patient will have appropriate compression garments for lymphedema maintenance He will order the neck compression garment.  8/9 Ongoing   Patient will have no sign or symptoms of infection  No open areas no weeping 9/4 Met   Patient will be independent with comprehensive maintenance program for lymphedema He has a hand held massager he will start using at home. 8/23 Ongoing                                   Assessment/Plan     ASSESSMENT:   Patient has not filled out the paperwork for the pump yet, I did strongly encourage him to do so. The tissue softens quicker today with the MLD, he is using the hand held massager at home which helps.  He would benefit from the [employer] pump.     PLAN:   Cont POC    SIGNATURE: Pastora Hamlin PTA, [employer] License #: [id number]  Electronically Signed on 9/4/2024        [address]  [place] [address]  [phone]

## 2024-09-06 ENCOUNTER — HOSPITAL ENCOUNTER (OUTPATIENT)
Facility: HOSPITAL | Age: 74
Discharge: HOME | End: 2024-09-06
Payer: MEDICARE

## 2024-09-06 ENCOUNTER — HOSPITAL ENCOUNTER (OUTPATIENT)
Dept: PHYSICAL THERAPY | Facility: HOSPITAL | Age: 74
Setting detail: THERAPIES SERIES
Discharge: HOME | End: 2024-09-06
Payer: MEDICARE

## 2024-09-06 DIAGNOSIS — R13.12 OROPHARYNGEAL DYSPHAGIA: Primary | ICD-10-CM

## 2024-09-06 DIAGNOSIS — I89.0 LYMPHEDEMA: Primary | ICD-10-CM

## 2024-09-06 PROCEDURE — 97140 MANUAL THERAPY 1/> REGIONS: CPT

## 2024-09-06 PROCEDURE — 92526 ORAL FUNCTION THERAPY: CPT | Performed by: SPEECH-LANGUAGE PATHOLOGIST

## 2024-09-06 NOTE — THERAPY PROGRESS REPORT/RE-CERT
Physical Therapy Treatment Note and 30 Day Progress Note  [address] [place] [address]    Patient: Fortino Dennis                                                 Visit Date: 2024  :     1950    Referring practitioner:    Alli Card Jr*  Date of Initial Visit:          Type: THERAPY  Noted: 2024    Visit Diagnoses:    ICD-10-CM ICD-9-CM   1. Lymphedema  I89.0 457.1     SUBJECTIVE     Subjective:  He spoke with Woody from [facility] about the financial form. No pain, he has been working on MLD at home.       PAIN: 0/10         OBJECTIVE     Objective    Lymphedema       Row Name 24 1230             Subjective Pain    Able to rate subjective pain? yes  -AL      Pre-Treatment Pain Level 0  -AL         Lymphedema Measurements    Measurement Type(s) Circumferential  -AL      Circumferential Areas Head  -AL         Head Circumferential (cm)    Measurement Location 1 upper lip  -AL      Head 1 46 cm  -AL      Measurement Location 2 lower lip  -AL      Head 2 44.5 cm  -AL      Head Circumferential Total 90.5 cm  -AL         Neck Circumferential (cm)    Measurement Location 1 vinod's apple  -AL      Neck 1 33 cm  -AL      Neck Circumferential Total 33 cm  -AL         Manual Lymphatic Drainage    Manual Lymphatic Drainage initial sequence;head/neck treatment;opened regional lymph nodes  -AL      Initial Sequence full neck;abdomen;diaphragmatic breathing  -AL      Abdomen superficial  -AL      Diaphragmatic Breathing x 9 with abdomen  -AL      Opened Regional Lymph Nodes axillary  -AL      Axillary right;left  -AL      Head/Neck Treatment pre-auricular nodes;post-auricular nodes;occipital nodes;sublingual nodes;full/complex MLD  -AL      Manual Lymphatic Drainage Comments Gentle neck stretches:  B rotation and B sidebending. IASTM using the hand held massager to the anterior neck  -AL      Manual Therapy 42  -AL                 User Key  (r) = Recorded By, (t) = Taken By, (c) = Cosigned By      Initials Name Provider Type    Pastora Serrano PTA, CLT-LANA Physical Therapist Assistant                    Therapy Education/Self Care [id number]   Education offered today Work on paperwork for [employer]   Medbridge Code    Ongoing HEP   Work on MLD and HEP   Timed Minutes        Total Timed Treatment:     42   mins  Total Time of Visit:             42   mins         ASSESSMENT/PLAN     GOALS  Goals                                          Progress Note due by 10/6/24                                                      Recert due by 10/9/24   STG by: 4 weeks Comments Date Status   Patient will have a good basic understanding of lymphedema and its suggested risk reduction practices Continue to educate 9/6 Ongoing   Patient will be independent with remedial HEP for lymphedema  He is working on the HEP 9/6 Ongoing   Patient will be independent with self MLD for lymphedema He works on IASTM with the hand held massager 9/6 Ongoing             LTG by: 8 Weeks         Patient will have appropriate compression garments for lymphedema maintenance He will order the neck compression garment.  9/6 Ongoing   Patient will have no sign or symptoms of infection  No open areas no weeping 9/4 Met   Patient will be independent with comprehensive maintenance program for lymphedema He will fill out paperwork for the Flexitouch pump 9/6 Ongoing                                      Assessment/Plan     ASSESSMENT:   Patient has had a reduction in the face and neck measurements.  The anterior neck dense tissue has softened, he is working on using the hand held massager at home. The dense tissue does soften further today.  Woody from [employer] did talk to patient today and helped him fill out his paperwork for financial assistance for the Flexitouch pump.     PLAN:   Continue with MLD    SIGNATURE: Pastora Hamlin PTA, CLT-LANA, [place] License #:  [id number]  Electronically Signed on 9/6/2024        [address] [place] [address]  [phone]

## 2024-09-06 NOTE — PROGRESS NOTES
Speech Language Pathology Treatment Note and 30 Day Progress Note  [address] [place] [address]    Patient: Fortino Dennis                                                                                     Visit Date: 2024  :     1950    Referring practitioner:    Alli Card Jr*  Date of Initial Visit:        2024    Visit Diagnoses:    ICD-10-CM ICD-9-CM   1. Oropharyngeal dysphagia  R13.12 787.22           SUBJECTIVE     Patient arrived alert and ready for therapy.     Pain: Patient reports no pain today.    OBJECTIVE   GOALS  Goals                                            STG  Comments Status   Patient will improve oral skills to enhance safety and increase eating efficiency and bolus control as measured by increased lip closure, decreased anterior loss of bolus, improved bolus formation, improved posterior propulsion of the bolus, decreased drooling, and increased jaw ROM with decreased jaw pain . Patient is reportedly completing oral exercises at home. He has not yet called the company back about his orastretch for trismus.      Previous: ROM scale opening at evaluation on 24 was 24mm. Previous 2023 was 29mm, 5mm reduction in ROM.  Ongoing   Patient will improve hyolaryngeal elevation, improve epiglottic inversion, improve laryngeal closure, increase base of the tongue strength and posterior pharyngeal wall excursion, and increase efficiency of cough to clear residue from the airway as measured by decreased overt signs and symptoms of aspiration and decreased penetration and aspiration on repeat instrumental swallow study, if applicable.  Completed swallow exercise with NMES (estim) submental and bilateral. Completed 30 min with effortful swallows at level 16, which patient felt was sufficient.  Ongoing   Patient will report decreased difficulty with swallowing and improved EAT-10 score. Initial  "eat 10 score 14, Mild. Patient reported that he thinks his throat \"feels a little better\" after the last session.   Ongoing   Patient will improve oral hygiene to enhance safety and decrease risk of aspiration pneumonia Patient reports no changes since last session.     Ongoing   LTG        Patient will safely consume PO diet of 0 - Thin drinks and 4 - Puree foods to maintain nutrition/hydration without complications such as aspiration or pneumonia.  Patient is currently only eating very soft or liquidized foods on his PO diet. He is scheduled for consult with general surgery for PEG tube.   Ongoing       Therapy Education/Self Care    Details: POC   Given Home exercises and education.    Progress: Reinforced   Education provided to:  Patient   Level of understanding Verbalized         CPT 11538  Total Time of Visit:             40   mins         ASSESSMENT/PLAN     ASSESSMENT:  Patient was able to complete swallowing with estim for 30 min with quad electrode placement at level 15 today.      PLAN: Continue therapy and home exercises. Patient to see general surgery regarding peg tube placement.     Progress Note Due Date:10/11/2024  Recertification Due Date: 11/11/2024    Signature:  Sosa Membreno CCC-SLP, [place] License #: [id number]  Electronically signed on 9/6/2024          [facility] [place] [address]  [phone]      "

## 2024-09-09 ENCOUNTER — OFFICE VISIT (OUTPATIENT)
Dept: SURGERY | Facility: CLINIC | Age: 74
End: 2024-09-09
Payer: MEDICARE

## 2024-09-09 VITALS
HEIGHT: 73 IN | HEART RATE: 98 BPM | BODY MASS INDEX: 18.16 KG/M2 | WEIGHT: 137 LBS | OXYGEN SATURATION: 100 % | DIASTOLIC BLOOD PRESSURE: 70 MMHG | SYSTOLIC BLOOD PRESSURE: 120 MMHG

## 2024-09-09 DIAGNOSIS — C32.9 SQUAMOUS CELL CARCINOMA OF LARYNX: ICD-10-CM

## 2024-09-09 DIAGNOSIS — C14.0 SQUAMOUS CELL CARCINOMA OF PHARYNX: ICD-10-CM

## 2024-09-09 DIAGNOSIS — Z43.1 ENCOUNTER FOR PEG (PERCUTANEOUS ENDOSCOPIC GASTROSTOMY): Primary | ICD-10-CM

## 2024-09-09 PROCEDURE — 1159F MED LIST DOCD IN RCRD: CPT

## 2024-09-09 PROCEDURE — 1160F RVW MEDS BY RX/DR IN RCRD: CPT

## 2024-09-09 PROCEDURE — 3074F SYST BP LT 130 MM HG: CPT

## 2024-09-09 PROCEDURE — 3078F DIAST BP <80 MM HG: CPT

## 2024-09-09 PROCEDURE — 99204 OFFICE O/P NEW MOD 45 MIN: CPT

## 2024-09-09 NOTE — PROGRESS NOTES
Office New Patient History and Physical:     Referring Provider: Jc Vargas DO    Chief Complaint   Patient presents with    Follow-up       Subjective .     History of present illness:  Fortino Dennis is a 73 y.o. male who presents to the clinic for discussion of PEG tube placement. He has had a PEG placed twice before. The last one fell out about a year ago and he did not have it replaced as he wanted to try and tolerate oral feedings. He has not had any previous abdominal surgeries other than PEG tube placement.     BMI is 18.07. He is a nonsmoker. He does take 20mg of Xarelto for Afib. He has not stopped the Xarelto as he has only been on this for approximately 90 days.     Review of Systems    Review of Systems - General ROS: negative  ENT ROS: negative  Respiratory ROS: no cough, shortness of breath, or wheezing  Cardiovascular ROS: no chest pain or dyspnea on exertion  Gastrointestinal ROS: positive for - swallowing difficulty/pain  Genito-Urinary ROS: no dysuria, trouble voiding, or hematuria  Dermatological ROS: negative   Breast ROS: negative for breast lumps  Hematological and Lymphatic ROS: negative  Musculoskeletal ROS: negative   Neurological ROS: no TIA or stroke symptoms    Psychological ROS: negative  Endocrine ROS: negative    History  Past Medical History:   Diagnosis Date    Acquired hypothyroidism     Anxiety     Arthritis     Atrial fibrillation     Dental disease     Depression     Essential hypertension     GERD (gastroesophageal reflux disease)     Headache 2010 first noticed    Hiatal hernia     Hyperlipidemia     Hypertension     Memory problem     aricept    Mixed hyperlipidemia     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    Sleep apnea     Throat cancer     Thyroid disorder     Tinnitus 2020    TMJ dysfunction 2020 wear    ,   Past Surgical History:   Procedure Laterality Date    CATARACT EXTRACTION WITH INTRAOCULAR LENS IMPLANT Bilateral     pt states x3 surgeries     GTUBE INSERTION      LARYNGOSCOPY Bilateral 07/07/2023    Procedure: MICRODIRECT LARYNGOSCOPY;  Surgeon: Alli Card Jr., MD;  Location:  [facility];  Service: ENT;  Laterality: Bilateral;    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    MANDIBLE SURGERY  2021    removal of necrotic jaw (part of 1 of 2 surgeries)    MASTECTOMY Left     removal of breast due to benign growth    PANENDOSCOPY Bilateral 07/07/2023    Procedure: DIRECT LARYNGOSCOPY, ESOPHAGOSCOPY, BRONCHOSCOPY, MICRODIRECT LARYNGOSCOPY,OR ESOPHAGEAL DILATATION 38-48;  Surgeon: Alli Card Jr., MD;  Location:  [facility];  Service: ENT;  Laterality: Bilateral;    THROAT SURGERY  2007    cancerous mass removed   ,   Family History   Problem Relation Age of Onset    Cancer Mother     Hypertension Mother     Dementia Mother     Colon cancer Mother     Colon polyps Neg Hx    ,   Social History     Tobacco Use    Smoking status: Never     Passive exposure: Never    Smokeless tobacco: Never    Tobacco comments:     Zero -- none   Vaping Use    Vaping status: Never Used   Substance Use Topics    Alcohol use: Yes     Alcohol/week: 3.0 standard drinks of alcohol     Types: 3 Cans of beer per week     Comment: daily beers    Drug use: Never   , (Not in a hospital admission)   and Allergies:  Patient has no known allergies.    Current Outpatient Medications:     Acetaminophen (TYLENOL ARTHRITIS PAIN PO), Take 0.5 tablets by mouth Daily As Needed., Disp: , Rfl:     donepezil (ARICEPT) 5 MG tablet, Take 1 tablet by mouth Every Night., Disp: 90 tablet, Rfl: 1    levothyroxine (SYNTHROID, LEVOTHROID) 50 MCG tablet, Take 1 tablet by mouth Daily., Disp: , Rfl:     nystatin (MYCOSTATIN) 100,000 unit/mL suspension, Swish and swallow 5 mL 4 (Four) Times a Day., Disp: 473 mL, Rfl: 1    Restasis 0.05 % ophthalmic emulsion, Administer 1 drop to both eyes 2 (Two) Times a Day., Disp: , Rfl:     rivaroxaban (XARELTO) 20 MG tablet, Take 1 tablet by  "mouth Daily., Disp: 90 tablet, Rfl: 1    SENNA PO, Take 1 capsule by mouth Daily., Disp: , Rfl:     venlafaxine XR (Effexor XR) 150 MG 24 hr capsule, Take 1 capsule by mouth Daily., Disp: 90 capsule, Rfl: 1    zolpidem (AMBIEN) 10 MG tablet, Take 1 tablet by mouth At Night As Needed for Sleep., Disp: 30 tablet, Rfl: 1    Objective     Vital Signs   /70 (BP Location: Right arm, Patient Position: Sitting, Cuff Size: Adult)   Pulse 98   Ht 185.4 cm (73\")   Wt 62.1 kg (137 lb)   SpO2 100%   BMI 18.07 kg/m²      Physical Exam:  General appearance - alert, well appearing, and in no distress  Mental status - normal mood, behavior, speech, dress, motor activity, and thought processes  Eyes - sclera anicteric  Neck - supple, no significant adenopathy  Chest - no tachypnea, retractions or cyanosis  Heart - normal rate and regular rhythm  Abdomen - soft, nontender, nondistended, no masses or organomegaly  scars from previous PEG tube sites well healed in the epigastric region  Neurological - alert, oriented, normal speech, no focal findings or movement disorder noted  Extremities - no pedal edema noted  Skin - normal coloration and turgor, no rashes, no suspicious skin lesions noted    Results Review:  Result Review :            Assessment & Plan       Diagnoses and all orders for this visit:    1. Encounter for PEG (percutaneous endoscopic gastrostomy) (Primary)  -     Case Request; Standing  -     XR chest 1 vw; Future  -     ECG 12 Lead; Future  -     CBC & Differential; Future  -     Comprehensive Metabolic Panel; Future  -     ceFAZolin (ANCEF) 2,000 mg in sodium chloride 0.9 % 100 mL IVPB  -     Case Request    2. Squamous cell carcinoma of pharynx  Overview:  Added automatically from request for surgery [id number]      3. Squamous cell carcinoma of larynx  -     CBC & Differential; Future    Other orders  -     Follow Anesthesia Guidelines / Protocol; Future  -     Follow Anesthesia Guidelines / Protocol; " Standing  -     Verify / Perform Chlorhexidine Skin Prep; Standing  -     Obtain Informed Consent; Future  -     Provide NPO Instructions to Patient; Future  -     Chlorhexidine Skin Prep; Future  -     Notify physician (specify); Standing  -     Instructions on coughing, deep breathing, and incentive spirometry.; Standing  -     Oxygen Therapy-; Standing  -     Place Sequential Compression Device; Standing  -     Maintain Sequential Compression Device; Standing     Mr. Dennis is a 73 year old male who presents to the clinic for discussion of PEG tube placement. I discussed the procedure with the patient and he would like to proceed. He has just recently been placed on blood thinners and has not stopped them and therefore we will proceed with cardiac clearance before proceeding with surgery. We will contact Dr. Sierra for this. We will call him to schedule surgery once we have received this. Orders for surgery have been placed as well as prework including CBC, CMP, CXR, and EKG. He will call for an appointment if he has any new problems or concerns. He voices understanding and is agreeable to the plan.     Follow up:     Return if symptoms worsen or fail to improve.        Shreya Huizar PA-C  09/09/24  20:35 CDT

## 2024-09-11 ENCOUNTER — PATIENT ROUNDING (BHMG ONLY) (OUTPATIENT)
Dept: SURGERY | Facility: CLINIC | Age: 74
End: 2024-09-11
Payer: MEDICARE

## 2024-09-11 ENCOUNTER — HOSPITAL ENCOUNTER (OUTPATIENT)
Facility: HOSPITAL | Age: 74
Discharge: HOME | End: 2024-09-11
Payer: MEDICARE

## 2024-09-11 ENCOUNTER — HOSPITAL ENCOUNTER (OUTPATIENT)
Dept: PHYSICAL THERAPY | Facility: HOSPITAL | Age: 74
Setting detail: THERAPIES SERIES
Discharge: HOME | End: 2024-09-11
Payer: MEDICARE

## 2024-09-11 DIAGNOSIS — R13.12 OROPHARYNGEAL DYSPHAGIA: Primary | ICD-10-CM

## 2024-09-11 DIAGNOSIS — I89.0 LYMPHEDEMA: Primary | ICD-10-CM

## 2024-09-11 PROCEDURE — 92526 ORAL FUNCTION THERAPY: CPT | Performed by: SPEECH-LANGUAGE PATHOLOGIST

## 2024-09-11 PROCEDURE — 97140 MANUAL THERAPY 1/> REGIONS: CPT

## 2024-09-11 PROCEDURE — 97110 THERAPEUTIC EXERCISES: CPT

## 2024-09-11 NOTE — THERAPY TREATMENT NOTE
Physical Therapy Treatment Note  [address] [place] [address]    Patient: Fortino Dennis                                                 Visit Date: 2024  :     1950    Referring practitioner:    Alli Card Jr*  Date of Initial Visit:          Type: THERAPY  Episode: lymphedema        Visit Diagnoses:    ICD-10-CM ICD-9-CM   1. Lymphedema  I89.0 457.1     SUBJECTIVE     Subjective:  Patient states Dr. Anny Pearce is going to do a feeding tube placement soon.  He has not filled out the paperwork for the Flexitouch pump.     PAIN: 0/10         OBJECTIVE     Objective    Lymphedema       Row Name 24 1230             Subjective Pain    Able to rate subjective pain? yes  -AL      Pre-Treatment Pain Level 0  -AL         Manual Lymphatic Drainage    Manual Lymphatic Drainage initial sequence;head/neck treatment;opened regional lymph nodes  -AL      Initial Sequence full neck;abdomen;diaphragmatic breathing  -AL      Abdomen superficial  -AL      Diaphragmatic Breathing x 9 with abdomen  -AL      Opened Regional Lymph Nodes axillary  -AL      Axillary right;left  -AL      Head/Neck Treatment pre-auricular nodes;post-auricular nodes;occipital nodes;sublingual nodes;full/complex MLD  -AL      Manual Lymphatic Drainage Comments Used the hand held massager over the anterior neck including the scar tissue.  -AL      Manual Therapy 30  -AL                User Key  (r) = Recorded By, (t) = Taken By, (c) = Cosigned By      Initials Name Provider Type    AL Pastora Hamlin PTA, CLLANG-JULIA Physical Therapist Assistant                  Therapeutic Exercises    50465 Units Comments   Stretched neck into B rotation and lateral flexion  Gentle over pressure   B pec stretch                    Timed Minutes 10              Therapy Education/Self Care 95053   Education offered today Fill out paperwork for Treatsie  Code    Ongoing HEP   Cont self massage and stretches   Timed Minutes        Total Timed Treatment:     40   mins  Total Time of Visit:             40   mins         ASSESSMENT/PLAN     GOALS  Goals                                          Progress Note due by 10/6/24                                                      Recert due by 10/9/24   STG by: 4 weeks Comments Date Status   Patient will have a good basic understanding of lymphedema and its suggested risk reduction practices Continue to educate 9/6 Ongoing   Patient will be independent with remedial HEP for lymphedema  He is working on the HEP 9/6 Ongoing   Patient will be independent with self MLD for lymphedema He works on IASTM with the hand held massager, he will do follow up MLD. 9/11 Ongoing             LTG by: 8 Weeks         Patient will have appropriate compression garments for lymphedema maintenance He will order the neck compression garment.  9/6 Ongoing   Patient will have no sign or symptoms of infection  No open areas no weeping 9/4 Met   Patient will be independent with comprehensive maintenance program for lymphedema He will fill out paperwork for the Flexitouch pump 9/6 Ongoing                                         Assessment/Plan     ASSESSMENT:   Patient has not filled out the paperwork for the pump yet, I did strongly encourage him to do so.He has been using the hand held massager which helps keep the dense tissue softer.     PLAN:   Cont POC    SIGNATURE: Pastora Hamlin PTA, [facility] [place] License #: [id number]  Electronically Signed on 9/11/2024        [address] [place] [address]. [id number]  [phone]

## 2024-09-11 NOTE — PROGRESS NOTES
Speech Language Pathology Treatment Note  [address] [place] [address]    Patient: Fortino Dennis                                                                                     Visit Date: 2024  :     1950    Referring practitioner:    Alli Card Jr*  Date of Initial Visit:        2024    Visit Diagnoses:    ICD-10-CM ICD-9-CM   1. Oropharyngeal dysphagia  R13.12 787.22           SUBJECTIVE     Patient arrived alert and ready for therapy.     Pain: Patient reports no pain today.    OBJECTIVE   GOALS  Goals                                            STG  Comments Status   Patient will improve oral skills to enhance safety and increase eating efficiency and bolus control as measured by increased lip closure, decreased anterior loss of bolus, improved bolus formation, improved posterior propulsion of the bolus, decreased drooling, and increased jaw ROM with decreased jaw pain . Patient is reportedly completing oral exercises at home. He has not yet called the company back about his orastretch for trismus.      Previous: ROM scale opening at evaluation on 24 was 24mm. Previous 2023 was 29mm, 5mm reduction in ROM.  Ongoing   Patient will improve hyolaryngeal elevation, improve epiglottic inversion, improve laryngeal closure, increase base of the tongue strength and posterior pharyngeal wall excursion, and increase efficiency of cough to clear residue from the airway as measured by decreased overt signs and symptoms of aspiration and decreased penetration and aspiration on repeat instrumental swallow study, if applicable.  Completed swallow exercise with NMES (estim) submental and bilateral. Completed 30 min with effortful swallows at level 15, which patient felt was sufficient.  Ongoing   Patient will report decreased difficulty with swallowing and improved EAT-10 score. Initial eat 10 score 14, Mild.  "Patient reported that he has not seen improvement in his swallow. He stated that it \"helps while I'm here\" but that he \"declines\" in between.   Ongoing   Patient will improve oral hygiene to enhance safety and decrease risk of aspiration pneumonia Patient reports no changes since last session.     Ongoing   LTG        Patient will safely consume PO diet of 0 - Thin drinks and 4 - Puree foods to maintain nutrition/hydration without complications such as aspiration or pneumonia.  Patient is currently only eating very soft or liquidized foods on his PO diet. He has had consult with general surgery for PEG tube.   Ongoing       Therapy Education/Self Care    Details: POC   Given Home exercises and education.    Progress: Reinforced   Education provided to:  Patient   Level of understanding Verbalized         CPT 17843  Total Time of Visit:             40   mins         ASSESSMENT/PLAN     ASSESSMENT:  Patient was able to complete swallowing with estim for 30 min with quad electrode placement at level 15 today.      PLAN: Continue therapy and home exercises. Patient has seen general surgery regarding peg tube placement. Awaiting cardiac clearance to schedule per note.     Progress Note Due Date:10/11/2024  Recertification Due Date: 11/11/2024    Signature:  Sosa Membreno CCC-SLP, [place] License #: [id number]  Electronically signed on 9/11/2024          [place]. [id number]  [phone]      "

## 2024-09-11 NOTE — PROGRESS NOTES
September 11, 2024    Hello, may I speak with Fortino Dennis?    My name is Sofia      I am  with [facility]  [facility]  [address]  [facility] [address] [phone].    Before we get started may I verify your date of birth? 1950    I am calling to officially welcome you to our practice and ask about your recent visit. Is this a good time to talk? yes    Tell me about your visit with us. What things went well?  Everything went well.        We're always looking for ways to make our patients' experiences even better. Do you have recommendations on ways we may improve?  no    Overall were you satisfied with your first visit to our practice? yes       I appreciate you taking the time to speak with me today. Is there anything else I can do for you? no      Thank you, and have a great day.

## 2024-09-12 PROBLEM — I48.91 NEW ONSET ATRIAL FIBRILLATION: Status: ACTIVE | Noted: 2024-09-12

## 2024-09-16 ENCOUNTER — PREP FOR SURGERY (OUTPATIENT)
Dept: OTHER | Facility: HOSPITAL | Age: 74
End: 2024-09-16
Payer: MEDICARE

## 2024-09-16 DIAGNOSIS — Z85.819 HISTORY OF PHARYNGEAL CANCER: ICD-10-CM

## 2024-09-16 DIAGNOSIS — C32.9 SQUAMOUS CELL CARCINOMA OF LARYNX: ICD-10-CM

## 2024-09-16 DIAGNOSIS — Z43.1 ENCOUNTER FOR PEG (PERCUTANEOUS ENDOSCOPIC GASTROSTOMY): Primary | ICD-10-CM

## 2024-09-18 ENCOUNTER — HOSPITAL ENCOUNTER (OUTPATIENT)
Facility: HOSPITAL | Age: 74
Discharge: HOME | End: 2024-09-18
Admitting: OTOLARYNGOLOGY
Payer: MEDICARE

## 2024-09-18 ENCOUNTER — TELEPHONE (OUTPATIENT)
Dept: SURGERY | Facility: CLINIC | Age: 74
End: 2024-09-18
Payer: MEDICARE

## 2024-09-18 ENCOUNTER — ANESTHESIA EVENT (OUTPATIENT)
Dept: GASTROENTEROLOGY | Facility: HOSPITAL | Age: 74
End: 2024-09-18
Payer: MEDICARE

## 2024-09-18 DIAGNOSIS — R13.12 OROPHARYNGEAL DYSPHAGIA: Primary | ICD-10-CM

## 2024-09-18 PROCEDURE — 92526 ORAL FUNCTION THERAPY: CPT | Performed by: SPEECH-LANGUAGE PATHOLOGIST

## 2024-09-19 ENCOUNTER — ANESTHESIA (OUTPATIENT)
Dept: GASTROENTEROLOGY | Facility: HOSPITAL | Age: 74
End: 2024-09-19
Payer: MEDICARE

## 2024-09-19 ENCOUNTER — HOSPITAL ENCOUNTER (OUTPATIENT)
Facility: HOSPITAL | Age: 74
Setting detail: HOSPITAL OUTPATIENT SURGERY
Discharge: HOME | End: 2024-09-19
Attending: STUDENT IN AN ORGANIZED HEALTH CARE EDUCATION/TRAINING PROGRAM | Admitting: STUDENT IN AN ORGANIZED HEALTH CARE EDUCATION/TRAINING PROGRAM
Payer: MEDICARE

## 2024-09-19 VITALS
SYSTOLIC BLOOD PRESSURE: 125 MMHG | OXYGEN SATURATION: 97 % | BODY MASS INDEX: 17.63 KG/M2 | HEIGHT: 73 IN | WEIGHT: 133 LBS | HEART RATE: 72 BPM | TEMPERATURE: 96.5 F | RESPIRATION RATE: 20 BRPM | DIASTOLIC BLOOD PRESSURE: 74 MMHG

## 2024-09-19 DIAGNOSIS — Z43.1 ENCOUNTER FOR PEG (PERCUTANEOUS ENDOSCOPIC GASTROSTOMY): Primary | ICD-10-CM

## 2024-09-19 DIAGNOSIS — Z43.1 ENCOUNTER FOR PEG (PERCUTANEOUS ENDOSCOPIC GASTROSTOMY): ICD-10-CM

## 2024-09-19 PROCEDURE — 25010000002 CEFAZOLIN PER 500 MG

## 2024-09-19 PROCEDURE — 25810000003 SODIUM CHLORIDE 0.9 % SOLUTION: Performed by: NURSE ANESTHETIST, CERTIFIED REGISTERED

## 2024-09-19 PROCEDURE — 25010000002 VASOPRESSIN 20 UNIT/ML SOLUTION: Performed by: NURSE ANESTHETIST, CERTIFIED REGISTERED

## 2024-09-19 PROCEDURE — 43246 EGD PLACE GASTROSTOMY TUBE: CPT | Performed by: STUDENT IN AN ORGANIZED HEALTH CARE EDUCATION/TRAINING PROGRAM

## 2024-09-19 PROCEDURE — 25010000002 PROPOFOL 10 MG/ML EMULSION: Performed by: NURSE ANESTHETIST, CERTIFIED REGISTERED

## 2024-09-19 RX ORDER — LIDOCAINE HYDROCHLORIDE 20 MG/ML
INJECTION, SOLUTION EPIDURAL; INFILTRATION; INTRACAUDAL; PERINEURAL AS NEEDED
Status: DISCONTINUED | OUTPATIENT
Start: 2024-09-19 | End: 2024-09-19 | Stop reason: SURG

## 2024-09-19 RX ORDER — SODIUM CHLORIDE 9 MG/ML
40 INJECTION, SOLUTION INTRAVENOUS AS NEEDED
Status: CANCELLED | OUTPATIENT
Start: 2024-09-19

## 2024-09-19 RX ORDER — SODIUM CHLORIDE 9 MG/ML
100 INJECTION, SOLUTION INTRAVENOUS CONTINUOUS
Status: CANCELLED | OUTPATIENT
Start: 2024-09-19

## 2024-09-19 RX ORDER — ONDANSETRON 4 MG/1
4 TABLET, ORALLY DISINTEGRATING ORAL EVERY 8 HOURS PRN
Qty: 20 TABLET | Refills: 0 | Status: SHIPPED | OUTPATIENT
Start: 2024-09-19

## 2024-09-19 RX ORDER — OXYCODONE HYDROCHLORIDE 5 MG/1
5 TABLET ORAL EVERY 8 HOURS PRN
Qty: 10 TABLET | Refills: 0 | Status: SHIPPED | OUTPATIENT
Start: 2024-09-19 | End: 2025-09-19

## 2024-09-19 RX ORDER — SODIUM CHLORIDE 0.9 % (FLUSH) 0.9 %
10 SYRINGE (ML) INJECTION AS NEEDED
Status: CANCELLED | OUTPATIENT
Start: 2024-09-19

## 2024-09-19 RX ORDER — SODIUM CHLORIDE 9 MG/ML
500 INJECTION, SOLUTION INTRAVENOUS CONTINUOUS PRN
Status: DISCONTINUED | OUTPATIENT
Start: 2024-09-19 | End: 2024-09-19 | Stop reason: HOSPADM

## 2024-09-19 RX ORDER — ACETAMINOPHEN 325 MG/1
975 TABLET ORAL EVERY 8 HOURS
Start: 2024-09-19 | End: 2025-09-19

## 2024-09-19 RX ORDER — LIDOCAINE HYDROCHLORIDE 10 MG/ML
0.5 INJECTION, SOLUTION EPIDURAL; INFILTRATION; INTRACAUDAL; PERINEURAL ONCE AS NEEDED
Status: DISCONTINUED | OUTPATIENT
Start: 2024-09-19 | End: 2024-09-19 | Stop reason: HOSPADM

## 2024-09-19 RX ORDER — SODIUM CHLORIDE 0.9 % (FLUSH) 0.9 %
10 SYRINGE (ML) INJECTION AS NEEDED
Status: DISCONTINUED | OUTPATIENT
Start: 2024-09-19 | End: 2024-09-19 | Stop reason: HOSPADM

## 2024-09-19 RX ORDER — PROPOFOL 10 MG/ML
VIAL (ML) INTRAVENOUS AS NEEDED
Status: DISCONTINUED | OUTPATIENT
Start: 2024-09-19 | End: 2024-09-19 | Stop reason: SURG

## 2024-09-19 RX ORDER — SODIUM CHLORIDE 0.9 % (FLUSH) 0.9 %
10 SYRINGE (ML) INJECTION EVERY 12 HOURS SCHEDULED
Status: CANCELLED | OUTPATIENT
Start: 2024-09-19

## 2024-09-19 RX ADMIN — CEFAZOLIN 2000 MG: 2 INJECTION, POWDER, FOR SOLUTION INTRAMUSCULAR; INTRAVENOUS at 06:43

## 2024-09-19 RX ADMIN — SODIUM CHLORIDE 500 ML: 9 INJECTION, SOLUTION INTRAVENOUS at 06:42

## 2024-09-19 RX ADMIN — LIDOCAINE HYDROCHLORIDE 30 MG: 20 INJECTION, SOLUTION EPIDURAL; INFILTRATION; INTRACAUDAL; PERINEURAL at 07:12

## 2024-09-19 RX ADMIN — PROPOFOL 120 MG: 10 INJECTION, EMULSION INTRAVENOUS at 07:12

## 2024-09-20 DIAGNOSIS — Z43.1 ENCOUNTER FOR PEG (PERCUTANEOUS ENDOSCOPIC GASTROSTOMY): Primary | ICD-10-CM

## 2024-09-23 ENCOUNTER — OFFICE VISIT (OUTPATIENT)
Dept: OTOLARYNGOLOGY | Facility: CLINIC | Age: 74
End: 2024-09-23
Payer: MEDICARE

## 2024-09-23 ENCOUNTER — HOME HEALTH ADMISSION (OUTPATIENT)
Dept: HOME HEALTH SERVICES | Facility: HOME HEALTHCARE | Age: 74
End: 2024-09-23
Payer: MEDICARE

## 2024-09-23 ENCOUNTER — TELEPHONE (OUTPATIENT)
Dept: INTERNAL MEDICINE | Facility: CLINIC | Age: 74
End: 2024-09-23
Payer: MEDICARE

## 2024-09-23 VITALS
BODY MASS INDEX: 17.76 KG/M2 | HEIGHT: 73 IN | HEART RATE: 81 BPM | TEMPERATURE: 97.8 F | SYSTOLIC BLOOD PRESSURE: 118 MMHG | WEIGHT: 134 LBS | DIASTOLIC BLOOD PRESSURE: 79 MMHG

## 2024-09-23 DIAGNOSIS — R13.11 ORAL PHASE DYSPHAGIA: ICD-10-CM

## 2024-09-23 DIAGNOSIS — S02.5XXA BROKEN TEETH: ICD-10-CM

## 2024-09-23 DIAGNOSIS — K05.10 GINGIVITIS: ICD-10-CM

## 2024-09-23 DIAGNOSIS — K12.33 MUCOSITIS DUE TO RADIATION THERAPY: ICD-10-CM

## 2024-09-23 DIAGNOSIS — K22.2 STENOSIS OF ESOPHAGUS: ICD-10-CM

## 2024-09-23 DIAGNOSIS — I89.0 LYMPHEDEMA: ICD-10-CM

## 2024-09-23 DIAGNOSIS — R25.2 TRISMUS: ICD-10-CM

## 2024-09-23 DIAGNOSIS — K02.9 CARIES: ICD-10-CM

## 2024-09-23 DIAGNOSIS — K02.9 CARIES INVOLVING MULTIPLE SURFACES OF TOOTH: ICD-10-CM

## 2024-09-23 DIAGNOSIS — R47.1 DYSARTHRIA: ICD-10-CM

## 2024-09-23 DIAGNOSIS — R13.12 OROPHARYNGEAL DYSPHAGIA: Primary | ICD-10-CM

## 2024-09-23 DIAGNOSIS — Z85.819 HISTORY OF PHARYNGEAL CANCER: ICD-10-CM

## 2024-09-23 DIAGNOSIS — E03.9 ACQUIRED HYPOTHYROIDISM: ICD-10-CM

## 2024-09-23 DIAGNOSIS — E44.0 MODERATE PROTEIN-CALORIE MALNUTRITION: ICD-10-CM

## 2024-09-23 PROCEDURE — 99214 OFFICE O/P EST MOD 30 MIN: CPT | Performed by: OTOLARYNGOLOGY

## 2024-09-23 PROCEDURE — 3078F DIAST BP <80 MM HG: CPT | Performed by: OTOLARYNGOLOGY

## 2024-09-23 PROCEDURE — 1159F MED LIST DOCD IN RCRD: CPT | Performed by: OTOLARYNGOLOGY

## 2024-09-23 PROCEDURE — 3074F SYST BP LT 130 MM HG: CPT | Performed by: OTOLARYNGOLOGY

## 2024-09-23 PROCEDURE — 1160F RVW MEDS BY RX/DR IN RCRD: CPT | Performed by: OTOLARYNGOLOGY

## 2024-09-24 DIAGNOSIS — Z43.1 ENCOUNTER FOR PEG (PERCUTANEOUS ENDOSCOPIC GASTROSTOMY): Primary | ICD-10-CM

## 2024-09-25 ENCOUNTER — HOSPITAL ENCOUNTER (OUTPATIENT)
Facility: HOSPITAL | Age: 74
Discharge: HOME | End: 2024-09-25
Admitting: OTOLARYNGOLOGY
Payer: MEDICARE

## 2024-09-25 ENCOUNTER — HOSPITAL ENCOUNTER (OUTPATIENT)
Dept: PHYSICAL THERAPY | Facility: HOSPITAL | Age: 74
Setting detail: THERAPIES SERIES
Discharge: HOME | End: 2024-09-25
Payer: MEDICARE

## 2024-09-25 ENCOUNTER — TELEMEDICINE (OUTPATIENT)
Dept: NUTRITION | Facility: HOSPITAL | Age: 74
End: 2024-09-25

## 2024-09-25 VITALS — BODY MASS INDEX: 17.63 KG/M2 | HEIGHT: 73 IN | WEIGHT: 133 LBS

## 2024-09-25 DIAGNOSIS — Z85.819 HISTORY OF PHARYNGEAL CANCER: ICD-10-CM

## 2024-09-25 DIAGNOSIS — R13.12 OROPHARYNGEAL DYSPHAGIA: Primary | ICD-10-CM

## 2024-09-25 DIAGNOSIS — I89.0 LYMPHEDEMA: Primary | ICD-10-CM

## 2024-09-25 PROCEDURE — 97140 MANUAL THERAPY 1/> REGIONS: CPT | Performed by: PHYSICAL THERAPIST

## 2024-09-25 PROCEDURE — 92526 ORAL FUNCTION THERAPY: CPT | Performed by: SPEECH-LANGUAGE PATHOLOGIST

## 2024-09-25 NOTE — PROGRESS NOTES
Outpatient/Telehealth Nutrition Services  Patient Name:  Fortino Dennis  YOB: 1950  MRN: [id number]  Assessment Date:  9/25/2024  Time Spent on Phone with Patient: 10 minutes  Time Spent on Nutrition Assessment and Referral: 70 minutes     Nutrition/Diet History       Row Name 09/25/24 0940          Nutrition/Diet History    Typical Intake (Food/Fluid/EN/PN) Pt typcically consuming soft foods by mouth but ability to support UBW has diminished. Drinks Boost vanilla as able. Medications by mouth. NKFA. Reports hx of PEG: used 2 years with formula unknown, schedule was set bolus via syringe, unknown volume. Pt used 'at will' for about 2-3 years, and elected to have PEG placed again last week.     Supplemental Drinks/Foods/Additives Boost vanilla     Vitamin/Mineral Supplements None     Factors Affecting Nutritional Intake chewing difficulties;difficulty/impaired swallowing         Malnutrition Severity Assessment       Row Name 09/25/24 0990          Malnutrition Severity Assessment    Malnutrition Type Chronic Disease - Related Malnutrition        Insufficient Energy Intake     Insufficient Energy Intake Findings Severe     Insufficient Energy Intake  <75% of est. energy requirement for > or equal to 3 months  <75% for 6 m.        Unintentional Weight Loss     Unintentional Weight Loss Findings Moderate     Unintentional Weight Loss  Weight loss of 10% in six months  8.8% x 6m.        Muscle Loss    Loss of Muscle Mass Findings --  NFPE not performed, encounter via telephone        Fat Loss    Subcutaneous Fat Loss Findings --  NFPE not performed, encounter via telephone        Declining Functional Status    Declining Functional Status Findings Measurably Reduced  increased difficulty with chewing/swallowing        Criteria Met (Must meet criteria for severity in at least 2 of these categories: M Wasting, Fat Loss, Fluid, Secondary Signs, Wt. Status, Intake)    Patient meets criteria for  Moderate  (non-severe) Malnutrition      Problem/Interventions:   Problem 1       Row Name 09/25/24 0944          Nutrition Diagnoses Problem 1    Problem 1 Other (comment)  Moderate malnutrition in context of chronic disease     Etiology (related to) Medical Diagnosis;Factors Affecting Nutrition;Functional Diagnosis;MNT for Treatment/Condition     Oncology Head/neck cancer;Other (comment)  hx of, 2007     Functional Diagnosis Dysphagia;Other (comment)  seeing outpatient SLP     Reported/Observed By Patient     Appetite Poor     Oral Chewing Difficulty;Swallowing Difficulty     Signs/Symptoms (evidenced by) Report of Mnimal PO Intake;Unintended Weight Change;BMI;% IBW;% UBW;SLP/Swallow eval;EN Intake Delivery     Percent (%) of EN goal --  has not initiated at this time; POD 6     BMI 17 - 17.9     Percent (%) IBW 72 %     Percent (%) UBW 90 %     Unintended Weight Change Loss     Number of Pounds Lost 13     Weight loss time period 6m.     Swallow eval status Done     Type of SLP Evaluation --  outpatient setting; review SLP notes          Intervention Goal       Row Name 09/25/24 0948          Intervention Goal    General Nutrition support treatment     PO Tolerate PO     TF/PN Establish TF tolerance;Deliver estimated need (%)     Deliver % of Estimated Need 80 %  or greater; requires nutrition support for sole-source nutrition for greater than 90 days     Weight Appropriate weight gain         Recommendations:\   Nutrition Prescription       Row Name 09/25/24 0950          Nutrition Prescription EN    Enteral Prescription Enteral begin/change;Enteral to supply     Enteral Route PEG     Product Nutren 2 caterina  OR [employer] FORMULARY EQUIVALENT     TF Delivery Method Bolus     TF Bolus Goal Volume (mL) 250 mL     TF Bolus Frequency 4 times a day     TF Bolus Cycle Over [place]  via syringe     Water flush (mL)  240 mL     Water Flush Frequency Every feeding     New EN Prescription Ordered? No, recommended        EN  "to Supply    Kcal/Day 2000 Kcal/Day     Kcal/Kg 33 Kcal/Kg     Kcal/Kg Weight Method Actual weight     Protein (gm/day) 84 gm/day     Meet Estimated Kcal Need (%) 110 %     Meet Estimated Protein Need (%) 105 %     TF Free H2O (mL) 692 mL     Total Free H2O (mL/day) 1652 mL/day     Fiber Per Day (gm/day) 0 gm/day          Labs/Tests/Procedures/Meds       Row Name 09/25/24 0942          Labs/Procedures/Meds    Lab Results Reviewed reviewed        Diagnostic Tests/Procedures    Diagnostic Test/Procedure Reviewed reviewed     Diagnostic Test/Procedures Comments PEG, 09/19/2024        Medications    Pertinent Medications Reviewed reviewed     Pertinent Medications Comments Based on H&P of the OP PEG Placement          Physical Findings       Row Name 09/25/24 0939          Physical Findings    Overall Physical Appearance Pt reported height 73\", Weight 133lb. Weight hx: ~6m ago = 147lb, and ~1 year ago = 147lb. Pt agreed UBW 147lb     Enteral Access Devices PEG;other (see comments)  OP EAD 9/19/2024          Estimated/Assessed Needs - Anthropometrics       Row Name 09/25/24 0941          Anthropometrics    Height 185.4 cm (73\")     Weight 60.3 kg (133 lb)     Age for Calculations 73     Height for Calculation 1.854 m (6' 1\")     Weight for Calculation 60.3 kg (133 lb)     Additional Documentation Usual Body Weight (UBW) (Group)        Usual Body Weight (UBW)    Usual Body Weight 66.7 kg (147 lb)        Estimated/Assessed Needs    Additional Documentation KCAL/KG (Group);Protein Requirements (Group);Fluid Requirements (Group)        KCAL/KG    KCAL/KG 30 Kcal/Kg (kcal);Kcal/KG (kcal) comment     30 Kcal/Kg (kcal) 1809.84     KCAL/KG (kcal) +500 kcal/d for weight gain= 2310 kcal/d        Protein Requirements    Weight Used For Protein Calculations 66.7 kg (147 lb)  UBW     Est Protein Requirement Amount (gms/kg) 1.2 gm protein     Estimated Protein Requirements (gms/day) 80.01        Fluid Requirements    Fluid " Requirements (mL/day) 1810     RDA Method (mL) 1810          Nutrition Prescription Ordered       Row Name 09/25/24 0940          Nutrition Prescription EN    Enteral Route PEG     Product --  No active orders          Education/Evaluation       Row Name 09/25/24 0952          Education    Education Provided education regarding;Education topics     Provided education regarding Nutrition related factor;Other (comment)  malnutrition     Education Topics Fluid     Fluid (mL/day) --  encouraged adeuqate PO fluid intake         Electronically signed by:  Mehnaz Melendez RDN, RENETTA  09/25/24 09:54 CDT

## 2024-09-27 ENCOUNTER — HOSPITAL ENCOUNTER (OUTPATIENT)
Facility: HOSPITAL | Age: 74
Discharge: HOME | End: 2024-09-27
Payer: MEDICARE

## 2024-09-27 ENCOUNTER — HOSPITAL ENCOUNTER (OUTPATIENT)
Dept: PHYSICAL THERAPY | Facility: HOSPITAL | Age: 74
Setting detail: THERAPIES SERIES
Discharge: HOME | End: 2024-09-27
Payer: MEDICARE

## 2024-09-27 DIAGNOSIS — R13.12 OROPHARYNGEAL DYSPHAGIA: Primary | ICD-10-CM

## 2024-09-27 DIAGNOSIS — I89.0 LYMPHEDEMA: Primary | ICD-10-CM

## 2024-09-27 PROCEDURE — 92526 ORAL FUNCTION THERAPY: CPT | Performed by: SPEECH-LANGUAGE PATHOLOGIST

## 2024-09-27 PROCEDURE — 97140 MANUAL THERAPY 1/> REGIONS: CPT

## 2024-10-08 DIAGNOSIS — G47.00 INSOMNIA, UNSPECIFIED TYPE: ICD-10-CM

## 2024-10-09 ENCOUNTER — HOSPITAL ENCOUNTER (OUTPATIENT)
Facility: HOSPITAL | Age: 74
Setting detail: THERAPIES SERIES
Discharge: HOME | End: 2024-10-09
Payer: MEDICARE

## 2024-10-09 DIAGNOSIS — R13.12 OROPHARYNGEAL DYSPHAGIA: Primary | ICD-10-CM

## 2024-10-09 PROCEDURE — 92507 TX SP LANG VOICE COMM INDIV: CPT | Performed by: SPEECH-LANGUAGE PATHOLOGIST

## 2024-10-09 RX ORDER — ZOLPIDEM TARTRATE 10 MG/1
10 TABLET ORAL NIGHTLY PRN
Qty: 30 TABLET | Refills: 1 | Status: SHIPPED | OUTPATIENT
Start: 2024-10-09

## 2024-10-09 NOTE — PROGRESS NOTES
Speech Language Pathology Treatment Note and 30 Day Progress Note  [address] [place] [address]    Patient: Fortino Dennis                                                                                     Visit Date: 10/9/2024  :     1950    Referring practitioner:    Alli Card Jr*  Date of Initial Visit:        2024    Visit Diagnoses:    ICD-10-CM ICD-9-CM   1. Oropharyngeal dysphagia  R13.12 787.22         SUBJECTIVE     Patient arrived alert and ready for therapy.     Pain: Patient reports no pain today.    OBJECTIVE   GOALS  Goals                                            STG  Comments Status   Patient will improve oral skills to enhance safety and increase eating efficiency and bolus control as measured by increased lip closure, decreased anterior loss of bolus, improved bolus formation, improved posterior propulsion of the bolus, decreased drooling, and increased jaw ROM with decreased jaw pain . Patient is reportedly completing oral exercises at home. He has not shown any interest in calling the company back about his orastretch for trismus.      Previous: ROM scale opening at evaluation on 24 was 24mm. Previous 2023 was 29mm, 5mm reduction in ROM.  Ongoing   Patient will improve hyolaryngeal elevation, improve epiglottic inversion, improve laryngeal closure, increase base of the tongue strength and posterior pharyngeal wall excursion, and increase efficiency of cough to clear residue from the airway as measured by decreased overt signs and symptoms of aspiration and decreased penetration and aspiration on repeat instrumental swallow study, if applicable.  Completed swallow exercise with NMES (estim) submental and bilateral. Completed 30 min with effortful swallows at level 15 with no reported discomfort. Ongoing   Patient will report decreased difficulty with swallowing and improved EAT-10 score.  Initial eat 10 score 14, Mild. Patient reports that he has not noted any improvement in his swallow.   Ongoing   Patient will improve oral hygiene to enhance safety and decrease risk of aspiration pneumonia Patient reports no changes since last session.     Ongoing   LTG        Patient will safely consume PO diet of 0 - Thin drinks and 4 - Puree foods to maintain nutrition/hydration without complications such as aspiration or pneumonia.  Patient is currently only eating very soft or liquidized foods on his PO diet. He has had PEG tube placed due to nutritional status.   Ongoing       Therapy Education/Self Care    Details: POC   Given Home exercises and education.    Progress: Reinforced   Education provided to:  Patient   Level of understanding Verbalized         CPT 13317  Total Time of Visit:             40   mins         ASSESSMENT/PLAN     ASSESSMENT:  Patient was able to complete swallowing with estim for 30 min with quad electrode placement at level 15 today.       PLAN: Continue therapy and home exercises.    Progress Note Due Date:11/11/2024  Recertification Due Date: 11/11/2024    Signature:  Sosa Membreno CCC-SLP, [place] License #: [id number]  Electronically signed on 10/9/2024          [address] [place] [address] [place] [address]  [phone]

## 2024-10-09 NOTE — TELEPHONE ENCOUNTER
Rx Refill Note  Requested Prescriptions     Pending Prescriptions Disp Refills    zolpidem (AMBIEN) 10 MG tablet [Pharmacy Med Name: ZOLPIDEM 10MG TABLETS] 30 tablet      Sig: TAKE 1 TABLET BY MOUTH AT NIGHT AS NEEDED FOR SLEEP      Last office visit with prescribing clinician: 8/8/2024   Last telemedicine visit with prescribing clinician: Visit date not found   Next office visit with prescribing clinician: 10/16/2024                         Would you like a call back once the refill request has been completed: [] Yes [] No    If the office needs to give you a call back, can they leave a voicemail: [] Yes [] No    Lenny Schwarz MA  10/09/24, 11:30 CDTRx Refill Note  Requested Prescriptions     Pending Prescriptions Disp Refills    zolpidem (AMBIEN) 10 MG tablet [Pharmacy Med Name: ZOLPIDEM 10MG TABLETS] 30 tablet      Sig: TAKE 1 TABLET BY MOUTH AT NIGHT AS NEEDED FOR SLEEP      Last office visit with prescribing clinician: 8/8/2024   Last telemedicine visit with prescribing clinician: Visit date not found   Next office visit with prescribing clinician: 10/16/2024                         Would you like a call back once the refill request has been completed: [] Yes [] No    If the office needs to give you a call back, can they leave a voicemail: [] Yes [] No    Lenny Schwarz MA  10/09/24, 11:28 CDT

## 2024-10-11 ENCOUNTER — HOSPITAL ENCOUNTER (OUTPATIENT)
Facility: HOSPITAL | Age: 74
Setting detail: THERAPIES SERIES
Discharge: HOME | End: 2024-10-11
Payer: MEDICARE

## 2024-10-11 DIAGNOSIS — R13.12 OROPHARYNGEAL DYSPHAGIA: Primary | ICD-10-CM

## 2024-10-11 PROCEDURE — 92526 ORAL FUNCTION THERAPY: CPT | Performed by: SPEECH-LANGUAGE PATHOLOGIST

## 2024-10-11 NOTE — PROGRESS NOTES
"                                                                  Speech Language Pathology Treatment Note  [address] [place] [address]    Patient: Fortino Dennis                                                                                     Visit Date: 10/11/2024  :     1950    Referring practitioner:    Alli Card Jr*  Date of Initial Visit:        2024    Visit Diagnoses:    ICD-10-CM ICD-9-CM   1. Oropharyngeal dysphagia  R13.12 787.22       SUBJECTIVE     Patient arrived alert and ready for therapy.     Pain: Patient reports no pain today.    OBJECTIVE   GOALS  Goals                                            STG  Comments Status   Patient will improve oral skills to enhance safety and increase eating efficiency and bolus control as measured by increased lip closure, decreased anterior loss of bolus, improved bolus formation, improved posterior propulsion of the bolus, decreased drooling, and increased jaw ROM with decreased jaw pain . Patient is reportedly completing oral exercises at home. He has not shown any interest in calling the company back about his orastretch for trismus.      Previous: ROM scale opening at evaluation on 24 was 24mm. Previous 2023 was 29mm, 5mm reduction in ROM.  Ongoing   Patient will improve hyolaryngeal elevation, improve epiglottic inversion, improve laryngeal closure, increase base of the tongue strength and posterior pharyngeal wall excursion, and increase efficiency of cough to clear residue from the airway as measured by decreased overt signs and symptoms of aspiration and decreased penetration and aspiration on repeat instrumental swallow study, if applicable.  Completed swallow exercise with NMES (estim) submental and bilateral. Completed 30 min with effortful swallows at level 16 with no reported discomfort. He states that it \"helps while it's on\" but does not note any cumulative improvement in his swallow at this time.  " Ongoing   Patient will report decreased difficulty with swallowing and improved EAT-10 score. Initial eat 10 score 14, Mild. Patient reports that he has not noted any improvement in his swallow.   Ongoing   Patient will improve oral hygiene to enhance safety and decrease risk of aspiration pneumonia Patient reports completing. Demonstrates understanding     Met   LTG        Patient will safely consume PO diet of 0 - Thin drinks and 4 - Puree foods to maintain nutrition/hydration without complications such as aspiration or pneumonia.  Patient is currently only eating very soft or liquidized foods on his PO diet. He has had PEG tube placed due to nutritional status & uses as his primary nutrition.   Ongoing       Therapy Education/Self Care    Details: POC   Given Home exercises and education.    Progress: Reinforced   Education provided to:  Patient   Level of understanding Verbalized         CPT 60222  Total Time of Visit:             40   mins         ASSESSMENT/PLAN     ASSESSMENT:  Patient was able to complete swallowing with estim for 30 min with quad electrode placement at level 16 today. He has not noted any cumulative improvement in his swallow.       PLAN: Continue therapy and home exercises.    Progress Note Due Date:11/11/2024  Recertification Due Date: 11/11/2024    Signature:  Sosa Membreno, CCC-SLP, [place] License #: [id number]  Electronically signed on 10/11/2024          [place] [address]. [id number]  [phone]

## 2024-10-16 ENCOUNTER — APPOINTMENT (OUTPATIENT)
Facility: HOSPITAL | Age: 74
End: 2024-10-16
Payer: MEDICARE

## 2024-10-16 ENCOUNTER — APPOINTMENT (OUTPATIENT)
Dept: PHYSICAL THERAPY | Facility: HOSPITAL | Age: 74
End: 2024-10-16
Payer: MEDICARE

## 2024-10-18 ENCOUNTER — APPOINTMENT (OUTPATIENT)
Dept: PHYSICAL THERAPY | Facility: HOSPITAL | Age: 74
End: 2024-10-18
Payer: MEDICARE

## 2024-10-18 ENCOUNTER — APPOINTMENT (OUTPATIENT)
Facility: HOSPITAL | Age: 74
End: 2024-10-18
Payer: MEDICARE

## 2024-10-23 ENCOUNTER — HOSPITAL ENCOUNTER (OUTPATIENT)
Dept: PHYSICAL THERAPY | Facility: HOSPITAL | Age: 74
Setting detail: THERAPIES SERIES
Discharge: HOME | End: 2024-10-23
Payer: MEDICARE

## 2024-10-23 DIAGNOSIS — I89.0 LYMPHEDEMA: Primary | ICD-10-CM

## 2024-10-23 PROCEDURE — 97140 MANUAL THERAPY 1/> REGIONS: CPT

## 2024-10-23 NOTE — THERAPY TREATMENT NOTE
Physical Therapy Treatment Note, 10 Visit Progress Note, and 90 Day Recertification Note  [address] [place] [address]    Patient: Fortino Dennis                                                 Visit Date: 10/23/2024  :     1950    Referring practitioner:    Alli Card Jr*  Date of Initial Visit:          Type: THERAPY  Episode: lymphedema        Visit Diagnoses:    ICD-10-CM ICD-9-CM   1. Lymphedema  I89.0 457.1     SUBJECTIVE     Subjective:  He just hasn't felt good, he is not having any pain.  His neck is tight. He has not filled out the paperwork for the pump yet. He is using the feeding tube, he has not gained weight but has not lost any either.     PAIN: 0/10         OBJECTIVE     Objective    Lymphedema       Row Name 10/23/24 1400             Subjective Pain    Able to rate subjective pain? yes  -AL      Pre-Treatment Pain Level 0  -AL      Post-Treatment Pain Level 0  -AL         Lymphedema Measurements    Measurement Type(s) Circumferential  -AL      Circumferential Areas Neck  -AL         Head Circumferential (cm)    Measurement Location 1 upper lip  -AL      Head 1 45.5 cm  -AL      Measurement Location 2 lower lip  -AL      Head 2 44.5 cm  -AL      Head Circumferential Total 90 cm  -AL         Neck Circumferential (cm)    Measurement Location 1 vinod's apple  -AL      Neck 1 32.7 cm  -AL      Neck Circumferential Total 32.7 cm  -AL         Manual Lymphatic Drainage    Manual Lymphatic Drainage initial sequence;head/neck treatment;opened regional lymph nodes  -AL      Initial Sequence full neck;abdomen;diaphragmatic breathing  -AL      Abdomen superficial  -AL      Opened Regional Lymph Nodes axillary  -AL      Axillary right;left  -AL      Head/Neck Treatment pre-auricular nodes;post-auricular nodes;occipital nodes;sublingual nodes;full/complex MLD  -AL      Full/Complex MLD Stretched neck into lateral flexion  and rotation  -AL      Manual Lymphatic Drainage Comments Used the hand held massager over the anterior neck including the scar tissue.  -AL      Manual Therapy 40  -AL                User Key  (r) = Recorded By, (t) = Taken By, (c) = Cosigned By      Initials Name Provider Type    Pastora Serrano, MARIPOSA, HELEN-JULIA Physical Therapist Assistant                         Therapy Education/Self Care [id number]   Education offered today Fill out paperwork for [employer], work on MLD and using the hand held massager both sides of anterior neck.   Medbridge Code    Ongoing HEP   Cont self massage and stretches   Timed Minutes        Total Timed Treatment:     40   mins  Total Time of Visit:             40   mins         ASSESSMENT/PLAN     GOALS  Goals                                          Progress Note due by 11/22/24                                                      Recert due by 1/20/24   STG by: 4 weeks Comments Date Status   Patient will have a good basic understanding of lymphedema and its suggested risk reduction practices Continue to educate each treatment.  He has a good understanding. 10/23 Met/Ongoing   Patient will be independent with remedial HEP for lymphedema  He is working on the HEP, but he knows he needs to work on it more. 10/23 Ongoing   Patient will be independent with self MLD for lymphedema He works on IASTM with the hand held massager, he will do follow up MLD. 10/23 Met             LTG by: 8 Weeks         Patient will have appropriate compression garments for lymphedema maintenance He needs to order the compression garment 10/23 Ongoing   Patient will have no sign or symptoms of infection  No open areas no weeping 9/4 Met   Patient will be independent with comprehensive maintenance program for lymphedema He has not filled out the paperwork for the Flexitouch. 10/23 Ongoing                                         Assessment/Plan     ASSESSMENT:   He has not been able to order the compression  garment or fill the paperwork out for the pump. The right anterior neck scar is looser, the scar on the L side is tight.  He has been working more on the R side, he will also start working on the L side.  His neck ROM is still limited, he has had a reduction in the neck measurement and above the lip as well.     PLAN:   Cont POC    SIGNATURE: Pastora Hamlin PTA, [facility] [place] License #: [id number]  Electronically Signed on 10/23/2024        [address] [place] [address]. [address]  [phone]

## 2024-10-25 ENCOUNTER — HOSPITAL ENCOUNTER (OUTPATIENT)
Dept: PHYSICAL THERAPY | Facility: HOSPITAL | Age: 74
Setting detail: THERAPIES SERIES
Discharge: HOME | End: 2024-10-25
Payer: MEDICARE

## 2024-10-25 ENCOUNTER — HOSPITAL ENCOUNTER (OUTPATIENT)
Facility: HOSPITAL | Age: 74
Setting detail: THERAPIES SERIES
Discharge: HOME | End: 2024-10-25
Payer: MEDICARE

## 2024-10-25 DIAGNOSIS — I89.0 LYMPHEDEMA: Primary | ICD-10-CM

## 2024-10-25 DIAGNOSIS — R13.12 OROPHARYNGEAL DYSPHAGIA: Primary | ICD-10-CM

## 2024-10-25 DIAGNOSIS — Z85.819 HISTORY OF PHARYNGEAL CANCER: ICD-10-CM

## 2024-10-25 PROCEDURE — 97140 MANUAL THERAPY 1/> REGIONS: CPT

## 2024-10-25 PROCEDURE — 92526 ORAL FUNCTION THERAPY: CPT | Performed by: SPEECH-LANGUAGE PATHOLOGIST

## 2024-10-25 NOTE — PROGRESS NOTES
"                                                                  Speech Language Pathology Treatment Note  [address] [place] [address]    Patient: Fortino Dennis                                                                                     Visit Date: 10/25/2024  :     1950    Referring practitioner:    Alli Card Jr*  Date of Initial Visit:        2024    Visit Diagnoses:    ICD-10-CM ICD-9-CM   1. Oropharyngeal dysphagia  R13.12 787.22       SUBJECTIVE     Patient arrived alert and ready for therapy.     Pain: Patient reports no pain today.    OBJECTIVE   GOALS  Goals                                            STG  Comments Status   Patient will improve oral skills to enhance safety and increase eating efficiency and bolus control as measured by increased lip closure, decreased anterior loss of bolus, improved bolus formation, improved posterior propulsion of the bolus, decreased drooling, and increased jaw ROM with decreased jaw pain . Patient is reportedly completing oral exercises at home. He has not shown any interest in calling the company back about his orastretch for trismus.      Previous: ROM scale opening at evaluation on 24 was 24mm. Previous 2023 was 29mm, 5mm reduction in ROM.  Ongoing   Patient will improve hyolaryngeal elevation, improve epiglottic inversion, improve laryngeal closure, increase base of the tongue strength and posterior pharyngeal wall excursion, and increase efficiency of cough to clear residue from the airway as measured by decreased overt signs and symptoms of aspiration and decreased penetration and aspiration on repeat instrumental swallow study, if applicable.  Completed swallow exercise with NMES (estim) submental and bilateral. Completed 30 min with effortful swallows at level 16 with no reported discomfort. He states that it \"helps while it's on\" but does not note any cumulative improvement in his swallow at this time.  " Ongoing   Patient will report decreased difficulty with swallowing and improved EAT-10 score. Initial eat 10 score 14, Mild. Patient reports that he has not noted any improvement in his swallow.   Ongoing   Patient will improve oral hygiene to enhance safety and decrease risk of aspiration pneumonia Patient reports completing. Demonstrates understanding     Met   LTG        Patient will safely consume PO diet of 0 - Thin drinks and 4 - Puree foods to maintain nutrition/hydration without complications such as aspiration or pneumonia.  Patient is currently only eating very soft or liquidized foods on his PO diet. He has had PEG tube placed due to nutritional status & uses as his primary nutrition.   Ongoing       Therapy Education/Self Care    Details: POC   Given Home exercises and education.    Progress: Reinforced   Education provided to:  Patient   Level of understanding Verbalized         CPT 45510  Total Time of Visit:             40   mins         ASSESSMENT/PLAN     ASSESSMENT:  Patient was able to complete swallowing with estim for 30 min with quad electrode placement at level 16 today. He has not noted any cumulative improvement in his swallow.  He continues to feel that the Vitalstim estim he had in [place] was more helpful, but is not available here.     PLAN: Patient would like to keep his two more visits scheduled for next week. Then patient will discharge from therapy.     Progress Note Due Date:11/11/2024  Recertification Due Date: 11/11/2024    Signature:  Sosa Membreno CCC-SLP, [place] License #: [id number]  Electronically signed on 10/25/2024          [place] [address]. [id number]  [phone]

## 2024-10-25 NOTE — THERAPY PROGRESS REPORT/RE-CERT
30 Day Progress Note and 90 Day Recertification Addendum      Patient: Fortino Dennis           : 1950  Visit Date: 10/23/2024  Referring practitioner: Alli Card Jr*  Date of Initial Visit: Type: THERAPY  Episode: lymphedema    Visit Diagnoses:    ICD-10-CM ICD-9-CM   1. Lymphedema  I89.0 457.1          Clinical Progress: improved  Home Program Compliance: Yes  Progress toward previous goals: Partially Met  Prognosis to achieve goals: good    Objective     Assessment/Plan    Anticipated CPT codes: Therapeutic Exercise 18318, Manual Therapy 38417, and Self Care/Home Management 39001    I reviewed the treatment and goals with Sultana Hamlin and agree with the POC.    SIGNATURE: Ellen Rosenbaum, PT, DPT, CLT-JULIA, License #: [id number]  Electronically Signed on 10/25/2024      90 Day Recertification  Certification Period: 10/25/2024 through 2025  Based upon review of the patient's progress and continued therapy plan, it is my medical opinion that Fortino Dennis should continue physical therapy treatment at [facility]     PHYSICIAN: Alli Card Jr., MD (NPI: [id number])    Signature: __________________________________________________DATE: ___________________     Please sign and return via fax to [phone].   Thank you so much for letting us work with Fortino. I appreciate your letting us work with your patients. If you have any questions or concerns, please don't hesitate to contact me.

## 2024-10-25 NOTE — THERAPY TREATMENT NOTE
Physical Therapy Treatment Note  [address] [place] [address]    Patient: Fortino Dennis                                                 Visit Date: 10/25/2024  :     1950    Referring practitioner:    Alli Card Jr*  Date of Initial Visit:          Type: THERAPY  Episode: lymphedema        Visit Diagnoses:    ICD-10-CM ICD-9-CM   1. Lymphedema  I89.0 457.1   2. History of pharyngeal cancer  Z85.819 V10.02     SUBJECTIVE     Subjective:  He states he is still having issues with swallowing.  He is using the feeding tube to supplement his food.     PAIN: 0/10         OBJECTIVE     Objective    Lymphedema       Row Name 10/25/24 1415             Manual Lymphatic Drainage    Manual Lymphatic Drainage initial sequence;head/neck treatment;opened regional lymph nodes  -AL      Initial Sequence full neck;abdomen;diaphragmatic breathing  -AL      Abdomen superficial  -AL      Opened Regional Lymph Nodes axillary  -AL      Axillary right;left  -AL      Head/Neck Treatment pre-auricular nodes;post-auricular nodes;occipital nodes;sublingual nodes;full/complex MLD  -AL      Full/Complex MLD Stretched neck into lateral flexion and rotation  -AL      Manual Lymphatic Drainage Comments Used the hand held massager over the anterior neck including the scar tissue.  -AL      Manual Therapy 40  -AL                User Key  (r) = Recorded By, (t) = Taken By, (c) = Cosigned By      Initials Name Provider Type    AL Pastora Hamlin PTA, CLT-LANA Physical Therapist Assistant                           Therapy Education/Self Care [id number]   Education offered today Fill out paperwork for [employer], work on MLD and using the hand held massager both sides of anterior neck.   Medbridge Code    Ongoing HEP   Cont self massage and stretches   Timed Minutes        Total Timed Treatment:     40   mins  Total Time of Visit:             40   mins          ASSESSMENT/PLAN     GOALS  Goals                                          Progress Note due by 11/22/24                                                      Recert due by 1/20/24   STG by: 4 weeks Comments Date Status   Patient will have a good basic understanding of lymphedema and its suggested risk reduction practices Continue to educate each treatment.  He has a good understanding. 10/23 Met/Ongoing   Patient will be independent with remedial HEP for lymphedema  He has been able to work more on the HEP and MLD 10/25 Ongoing   Patient will be independent with self MLD for lymphedema He works on IASTM with the hand held massager, he will do follow up MLD. 10/23 Met             LTG by: 8 Weeks         Patient will have appropriate compression garments for lymphedema maintenance He needs to order the compression garment 10/23 Ongoing   Patient will have no sign or symptoms of infection  No open areas no weeping 9/4 Met   Patient will be independent with comprehensive maintenance program for lymphedema He has not filled out the paperwork for the Flexitouch. 10/25 Ongoing                                         Assessment/Plan     ASSESSMENT:   Patient is tight with his cervical ROM.  He has been working on the L side of his neck with the MLD and using the hand held massager. The tissue is slightly looser on the L side of the neck.     PLAN:   Cont POC    SIGNATURE: Pastora Hamlin PTA, [facility] License #: [id number]  Electronically Signed on 10/25/2024        [address] [place] [address]  [phone]

## 2024-10-30 ENCOUNTER — APPOINTMENT (OUTPATIENT)
Facility: HOSPITAL | Age: 74
End: 2024-10-30
Payer: MEDICARE

## 2024-10-30 ENCOUNTER — HOSPITAL ENCOUNTER (OUTPATIENT)
Dept: PHYSICAL THERAPY | Facility: HOSPITAL | Age: 74
Setting detail: THERAPIES SERIES
Discharge: HOME | End: 2024-10-30
Payer: MEDICARE

## 2024-10-30 DIAGNOSIS — Z85.819 HISTORY OF PHARYNGEAL CANCER: ICD-10-CM

## 2024-10-30 DIAGNOSIS — I89.0 LYMPHEDEMA: Primary | ICD-10-CM

## 2024-10-30 PROCEDURE — 97140 MANUAL THERAPY 1/> REGIONS: CPT

## 2024-11-01 ENCOUNTER — HOSPITAL ENCOUNTER (OUTPATIENT)
Facility: HOSPITAL | Age: 74
Setting detail: THERAPIES SERIES
Discharge: HOME | End: 2024-11-01
Payer: MEDICARE

## 2024-11-01 ENCOUNTER — HOSPITAL ENCOUNTER (OUTPATIENT)
Dept: PHYSICAL THERAPY | Facility: HOSPITAL | Age: 74
Setting detail: THERAPIES SERIES
Discharge: HOME | End: 2024-11-01
Payer: MEDICARE

## 2024-11-01 DIAGNOSIS — Z85.819 HISTORY OF PHARYNGEAL CANCER: ICD-10-CM

## 2024-11-01 DIAGNOSIS — I89.0 LYMPHEDEMA: Primary | ICD-10-CM

## 2024-11-01 DIAGNOSIS — R13.12 OROPHARYNGEAL DYSPHAGIA: Primary | ICD-10-CM

## 2024-11-01 PROCEDURE — 92526 ORAL FUNCTION THERAPY: CPT | Performed by: SPEECH-LANGUAGE PATHOLOGIST

## 2024-11-01 PROCEDURE — 97140 MANUAL THERAPY 1/> REGIONS: CPT

## 2024-11-01 NOTE — THERAPY TREATMENT NOTE
Physical Therapy Treatment Note  [address] [place] [address]    Patient: Fortino Dennis                                                 Visit Date: 2024  :     1950    Referring practitioner:    Alli Card Jr*  Date of Initial Visit:          Type: THERAPY  Episode: lymphedema        Visit Diagnoses:    ICD-10-CM ICD-9-CM   1. Lymphedema  I89.0 457.1   2. History of pharyngeal cancer  Z85.819 V10.02     SUBJECTIVE     Subjective:      PAIN: 0/10         OBJECTIVE     Objective    Lymphedema       Row Name 24 1100             Subjective Pain    Able to rate subjective pain? yes  -AL      Pre-Treatment Pain Level 0  -AL      Post-Treatment Pain Level 0  -AL         Manual Lymphatic Drainage    Manual Lymphatic Drainage initial sequence;head/neck treatment;opened regional lymph nodes  -AL      Initial Sequence full neck;abdomen;diaphragmatic breathing  -AL      Abdomen superficial  -AL      Opened Regional Lymph Nodes axillary  -AL      Axillary right;left  -AL      Head/Neck Treatment pre-auricular nodes;post-auricular nodes;occipital nodes;sublingual nodes;full/complex MLD  -AL      Full/Complex MLD Stretched neck into lateral flexion and rotation  -AL      Manual Lymphatic Drainage Comments Used the hand held massager over the anterior neck including the scar tissue.  -AL      Manual Therapy 40  -AL                User Key  (r) = Recorded By, (t) = Taken By, (c) = Cosigned By      Initials Name Provider Type    AL Pastora Hamlin PTA, CHIKA Physical Therapist Assistant                               Therapy Education/Self Care [id number]   Education offered today Fill out paperwork for [employer], work on MLD and using the hand held massager both sides of anterior neck.   Medbridge Code    Ongoing HEP   Cont self massage and stretches   Timed Minutes        Total Timed Treatment:     40   mins  Total Time of  Visit:             40   mins         ASSESSMENT/PLAN     GOALS  Goals                                          Progress Note due by 11/22/24                                                      Recert due by 1/20/24   STG by: 4 weeks Comments Date Status   Patient will have a good basic understanding of lymphedema and its suggested risk reduction practices Continue to educate each treatment.  He has a good understanding. 10/23 Met/Ongoing   Patient will be independent with remedial HEP for lymphedema  He has been able to work more on the HEP and MLD 10/25 Ongoing   Patient will be independent with self MLD for lymphedema He works on IASTM with the hand held massager, he will do follow up MLD. 10/23 Met             LTG by: 8 Weeks         Patient will have appropriate compression garments for lymphedema maintenance He has not ordered the compression garment 11/1 Ongoing   Patient will have no sign or symptoms of infection  No open areas no weeping 9/4 Met   Patient will be independent with comprehensive maintenance program for lymphedema Working towards his home maintenance program. 10/30 Ongoing                                         Assessment/Plan     ASSESSMENT:   The swelling is swelling is slightly improved today, he continues to use the hand held massager. Neck rotation and lateral flexion are still limited with ROM.     PLAN:   Cont POC    SIGNATURE: Pastora Hamlin PTA, LANGBRIAN DUMONT License #: [id number]  Electronically Signed on 11/1/2024        [address] [place] [address] [place] [address]  [phone]

## 2024-11-01 NOTE — PROGRESS NOTES
Speech Language Pathology Treatment Note  [address] [place] [address]    Patient: Fortino Dennis                                                                                     Visit Date: 2024  :     1950    Referring practitioner:    Alli Card Jr*  Date of Initial Visit:        2024    Visit Diagnoses:    ICD-10-CM ICD-9-CM   1. Oropharyngeal dysphagia  R13.12 787.22       SUBJECTIVE     Patient arrived alert and ready for therapy. This is his last scheduled therapy session.     Pain: Patient reports no pain today.    OBJECTIVE   GOALS  Goals                                            STG  Comments Status   Patient will improve oral skills to enhance safety and increase eating efficiency and bolus control as measured by increased lip closure, decreased anterior loss of bolus, improved bolus formation, improved posterior propulsion of the bolus, decreased drooling, and increased jaw ROM with decreased jaw pain . Patient is reportedly completing oral exercises at home. He has not shown any interest in calling the company back about his orastretch for trismus.      Previous: ROM scale opening at evaluation on 24 was 24mm. Previous 2023 was 29mm, 5mm reduction in ROM.  Ongoing   Patient will improve hyolaryngeal elevation, improve epiglottic inversion, improve laryngeal closure, increase base of the tongue strength and posterior pharyngeal wall excursion, and increase efficiency of cough to clear residue from the airway as measured by decreased overt signs and symptoms of aspiration and decreased penetration and aspiration on repeat instrumental swallow study, if applicable.  Completed swallow exercise with NMES (estim) submental and bilateral. Completed 30 min with effortful swallows at level 16 with no reported discomfort. He continues to report no lasting progress. He does feel that the estim  "treatment makes it \"easier\" for him to swallow for a couple of days after each treatment.  Ongoing   Patient will report decreased difficulty with swallowing and improved EAT-10 score. Initial eat 10 score 14, Mild. Patient reports that he has not noted any lasting improvement in his swallow.  Ongoing   Patient will improve oral hygiene to enhance safety and decrease risk of aspiration pneumonia Patient reports completing. Demonstrates understanding     Met   LTG        Patient will safely consume PO diet of 0 - Thin drinks and 4 - Puree foods to maintain nutrition/hydration without complications such as aspiration or pneumonia.  Patient is currently only taking liquids and liquidized foods on his PO diet. He relies on his PEG tube for primary nutrition. .   Ongoing        Therapy Education/Self Care    Details: POC   Given Home exercises and education.    Progress: Reinforced   Education provided to:  Patient   Level of understanding Verbalized         CPT 16877  Total Time of Visit:             40   mins         ASSESSMENT/PLAN     ASSESSMENT:  Patient was able to complete swallowing with estim for 30 min with quad electrode placement at level 16 today. He has not noted any cumulative improvement in his swallow but that it does make his swallowing \"feel better\" and \"easier\" for a couple of days after each treatment.     PLAN: Patient will continue home exercises. SLP will request a repeat swallow study from referring provider to determine progress and efficacy of any further therapy.     Thank you for this referral and for allowing us to participate in the care of this patient.     Signature:  Sosa Membreno, CCC-SLP, [place] License #: [id number]  Electronically signed on 11/1/2024          [place]. [id number]  [phone]      "

## 2024-11-07 ENCOUNTER — OFFICE VISIT (OUTPATIENT)
Dept: INTERNAL MEDICINE | Facility: CLINIC | Age: 74
End: 2024-11-07
Payer: MEDICARE

## 2024-11-07 VITALS
HEART RATE: 75 BPM | WEIGHT: 131.3 LBS | OXYGEN SATURATION: 97 % | RESPIRATION RATE: 16 BRPM | HEIGHT: 73 IN | SYSTOLIC BLOOD PRESSURE: 107 MMHG | DIASTOLIC BLOOD PRESSURE: 76 MMHG | BODY MASS INDEX: 17.4 KG/M2

## 2024-11-07 DIAGNOSIS — E03.9 ACQUIRED HYPOTHYROIDISM: ICD-10-CM

## 2024-11-07 DIAGNOSIS — C10.2 MALIGNANT NEOPLASM OF LATERAL WALL OF OROPHARYNX: ICD-10-CM

## 2024-11-07 DIAGNOSIS — I48.91 NEW ONSET ATRIAL FIBRILLATION: ICD-10-CM

## 2024-11-07 DIAGNOSIS — R13.12 OROPHARYNGEAL DYSPHAGIA: ICD-10-CM

## 2024-11-07 DIAGNOSIS — Z43.1 ENCOUNTER FOR PEG (PERCUTANEOUS ENDOSCOPIC GASTROSTOMY): Primary | ICD-10-CM

## 2024-11-07 DIAGNOSIS — I10 PRIMARY HYPERTENSION: ICD-10-CM

## 2024-11-07 DIAGNOSIS — E44.0 MODERATE PROTEIN-CALORIE MALNUTRITION: ICD-10-CM

## 2024-11-07 NOTE — PROGRESS NOTES
CC:f/u a fib    History:  Fortino Dennis is a 73 y.o. male   History of Present Illness  The patient presents for evaluation of weight loss. He is accompanied by his brother.    He reports a decrease in appetite and subsequent weight loss. He has been using a feeding tube minimally due to its smaller size compared to his previous one. He finds it challenging to use and prefers to eat normally. He consumes about 2 cans of food daily through the tube, but there are days when he doesn't manage even that. He doesn't experience a feeling of fullness after tube feeding. He has been blending his food to facilitate consumption.    His mood has been affected by his health issues and political strife, but he is sleeping well, although waking up earlier than usual.    He is currently receiving speech therapy, which he finds beneficial.        ROS:  Review of Systems   Constitutional:  Positive for appetite change and unexpected weight change. Negative for chills and fever.   Respiratory:  Negative for cough and shortness of breath.    Cardiovascular:  Negative for chest pain and palpitations.   Gastrointestinal:  Negative for abdominal pain and constipation.        reports that he has never smoked. He has never been exposed to tobacco smoke. He has never used smokeless tobacco. He reports current alcohol use of about 3.0 standard drinks of alcohol per week. He reports that he does not use drugs.      Current Outpatient Medications:     acetaminophen (Tylenol) 325 MG tablet, Take 3 tablets by mouth Every 8 (Eight) Hours. Take every 8 hours for 3 days then take prn as needed., Disp: , Rfl:     donepezil (ARICEPT) 5 MG tablet, Take 1 tablet by mouth Every Night., Disp: 90 tablet, Rfl: 1    levothyroxine (SYNTHROID, LEVOTHROID) 50 MCG tablet, Take 1 tablet by mouth Daily., Disp: , Rfl:     nystatin (MYCOSTATIN) 100,000 unit/mL suspension, Swish and swallow 5 mL 4 (Four) Times a Day., Disp: 473 mL, Rfl: 1    Restasis 0.05 %  "ophthalmic emulsion, Administer 1 drop to both eyes 2 (Two) Times a Day., Disp: , Rfl:     rivaroxaban (XARELTO) 20 MG tablet, Take 1 tablet by mouth Daily., Disp: 90 tablet, Rfl: 1    SENNA PO, Take 1 capsule by mouth Daily., Disp: , Rfl:     venlafaxine XR (Effexor XR) 150 MG 24 hr capsule, Take 1 capsule by mouth Daily., Disp: 90 capsule, Rfl: 1    zolpidem (AMBIEN) 10 MG tablet, Take 1 tablet by mouth At Night As Needed for Sleep., Disp: 30 tablet, Rfl: 1    OBJECTIVE:  /76 (BP Location: Left arm, Patient Position: Sitting, Cuff Size: Adult)   Pulse 75   Resp 16   Ht 185.4 cm (73\")   Wt 59.6 kg (131 lb 4.8 oz)   SpO2 97%   BMI 17.32 kg/m²    Physical Exam  Constitutional:       General: He is not in acute distress.  Pulmonary:      Effort: Pulmonary effort is normal. No respiratory distress.   Neurological:      Mental Status: He is alert and oriented to person, place, and time.   Psychiatric:         Mood and Affect: Mood normal.         Behavior: Behavior normal.           Assessment/Plan     Diagnoses and all orders for this visit:    1. Encounter for PEG (percutaneous endoscopic gastrostomy) (Primary)    2. Moderate protein-calorie malnutrition    3. 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 Oropharyngeal dysphagia    5. New onset atrial fibrillation    6. Primary hypertension    7. Acquired hypothyroidism      Assessment & Plan  1. Weight loss.  He has been experiencing weight loss due to difficulty using his feeding tube. He was advised to increase his intake to at least 2 cans per day, even if it means consuming half a can at a time, to help his stomach adjust and prevent further weight loss. He was encouraged to continue using the feeding tube more frequently to maintain his weight.    2. Atrial fibrillation.  His cardiac test from the summer indicated atrial fibrillation, but no other concerning findings were noted. He was reassured that as long as he does not experience heart " racing, skipped beats, fainting, dizziness, or lightheadedness, no additional interventions are necessary. His atrial fibrillation is well-managed on meds, which are continued.    3. Mood concerns.  He reported that his mood has not been in a good place, likely due to external factors rather than his health. He was encouraged to continue with his current coping strategies and to seek support if needed.    Follow-up  Return in 3 months for follow up or sooner if needed.          An After Visit Summary was printed and given to the patient at discharge.  Return in about 3 months (around 2/7/2025) for Recheck.      Patient or patient representative verbalized consent for the use of Ambient Listening during the visit with  Fortino Sierra DO for chart documentation. 11/7/2024  22:38 CST    Fortino Sierra D.O. 11/7/2024   Electronically signed.

## 2024-11-08 ENCOUNTER — HOSPITAL ENCOUNTER (OUTPATIENT)
Dept: PHYSICAL THERAPY | Facility: HOSPITAL | Age: 74
Setting detail: THERAPIES SERIES
Discharge: HOME | End: 2024-11-08
Payer: MEDICARE

## 2024-11-08 DIAGNOSIS — I89.0 LYMPHEDEMA: Primary | ICD-10-CM

## 2024-11-08 DIAGNOSIS — Z85.819 HISTORY OF PHARYNGEAL CANCER: ICD-10-CM

## 2024-11-08 PROCEDURE — 97140 MANUAL THERAPY 1/> REGIONS: CPT | Performed by: PHYSICAL THERAPIST

## 2024-11-08 NOTE — THERAPY TREATMENT NOTE
Physical Therapy Treatment Note  [address] [place] [address]    Patient: Fortino Dennis                                                 Visit Date: 2024  :     1950    Referring practitioner:    Alli Card Jr*  Date of Initial Visit:          Type: THERAPY  Episode: lymphedema        Visit Diagnoses:    ICD-10-CM ICD-9-CM   1. Lymphedema  I89.0 457.1   2. History of pharyngeal cancer  Z85.819 V10.02     SUBJECTIVE     Subjective:  He can't tell a lot of improvement     PAIN: 0/10         OBJECTIVE     Objective    Lymphedema       Row Name 24 1500             Subjective Pain    Able to rate subjective pain? yes  -HR      Pre-Treatment Pain Level 0  -HR         Manual Lymphatic Drainage    Manual Lymphatic Drainage initial sequence;head/neck treatment;opened regional lymph nodes  -HR      Initial Sequence full neck;abdomen;diaphragmatic breathing  -HR      Abdomen superficial  -HR      Opened Regional Lymph Nodes axillary  -HR      Axillary right;left  -HR      Head/Neck Treatment pre-auricular nodes;post-auricular nodes;occipital nodes;sublingual nodes;full/complex MLD  -HR      Full/Complex MLD Stretched neck into lateral flexion and rotation  -HR      Manual Lymphatic Drainage Comments Used the hand held massager over the anterior neck including the scar tissue.  -HR      Manual Therapy 45  -HR                User Key  (r) = Recorded By, (t) = Taken By, (c) = Cosigned By      Initials Name Provider Type    HR Ellen Rosenbaum, PT, DPT, CLT-JULIA Physical Therapist                               Therapy Education/Self Care [id number]   Education offered today Fill out paperwork for [employer], work on MLD and using the hand held massager both sides of anterior neck.   Medbridge Code    Ongoing HEP   Cont self massage and stretches   Timed Minutes        Total Timed Treatment:     45   mins  Total Time of  Visit:             45   mins         ASSESSMENT/PLAN     GOALS  Goals                                          Progress Note due by 11/22/24                                                      Recert due by 1/20/24   STG by: 4 weeks Comments Date Status   Patient will have a good basic understanding of lymphedema and its suggested risk reduction practices Continue to educate each treatment.  He has a good understanding. 10/23 Met/Ongoing   Patient will be independent with remedial HEP for lymphedema  He has been able to work more on the HEP and MLD 10/25 Ongoing   Patient will be independent with self MLD for lymphedema He works on IASTM with the hand held massager, he will do follow up MLD. 10/23 Met             LTG by: 8 Weeks         Patient will have appropriate compression garments for lymphedema maintenance He has not ordered the compression garment 11/8 Ongoing   Patient will have no sign or symptoms of infection  No open areas no weeping 9/4 Met   Patient will be independent with comprehensive maintenance program for lymphedema Working towards his home maintenance program. He thinks the massager is helping 11/8 Ongoing                                         Assessment/Plan     ASSESSMENT:   The appearance of his anterior neck is definitely changing. Less swelling is present as I can see veins below the skin. The tendons are still very prominent and fibrotic. Motion is still restricted in rotation and lateral flexion.     PLAN:   Cont POC    SIGNATURE: Ellen Rosenbaum, PT, DPT, HELEN-BRIAN DUMONT License #: [id number]  Electronically Signed on 11/8/2024        [address] [place] [address] [place] [address]  [phone]

## 2024-11-15 DIAGNOSIS — R13.12 OROPHARYNGEAL DYSPHAGIA: ICD-10-CM

## 2024-11-15 DIAGNOSIS — K12.33 MUCOSITIS DUE TO RADIATION THERAPY: ICD-10-CM

## 2024-11-15 DIAGNOSIS — R13.11 ORAL PHASE DYSPHAGIA: Primary | ICD-10-CM

## 2024-11-15 DIAGNOSIS — Z85.819 HISTORY OF PHARYNGEAL CANCER: ICD-10-CM

## 2024-11-25 ENCOUNTER — TELEPHONE (OUTPATIENT)
Dept: OTOLARYNGOLOGY | Facility: CLINIC | Age: 74
End: 2024-11-25
Payer: MEDICARE

## 2024-11-25 NOTE — TELEPHONE ENCOUNTER
Hub staff attempted to follow warm transfer process and was unsuccessful     Caller: Fortino Dennis     Relationship to patient: SELF    Best call back number: [phone]     Patient is needing: PT HAS AN APPT ON WEDNESDAY BUT WOULD LIKE TO RESCHEDULE. NEXT AVAILABLE ISN'T UNTIL JANUARY AND PT WANTS TO TRY TO GET IN DURING DECEMBER. PLEASE GIVE PT A CALL BACK TO RESCHEDULE.

## 2024-12-04 ENCOUNTER — OFFICE VISIT (OUTPATIENT)
Dept: OTOLARYNGOLOGY | Facility: CLINIC | Age: 74
End: 2024-12-04
Payer: MEDICARE

## 2024-12-04 VITALS
HEART RATE: 85 BPM | DIASTOLIC BLOOD PRESSURE: 81 MMHG | SYSTOLIC BLOOD PRESSURE: 123 MMHG | HEIGHT: 73 IN | BODY MASS INDEX: 17.26 KG/M2 | WEIGHT: 130.2 LBS | TEMPERATURE: 98.4 F

## 2024-12-04 DIAGNOSIS — K22.2 STENOSIS OF ESOPHAGUS: ICD-10-CM

## 2024-12-04 DIAGNOSIS — I89.0 LYMPHEDEMA: ICD-10-CM

## 2024-12-04 DIAGNOSIS — R47.1 DYSARTHRIA: ICD-10-CM

## 2024-12-04 DIAGNOSIS — R13.12 OROPHARYNGEAL DYSPHAGIA: ICD-10-CM

## 2024-12-04 DIAGNOSIS — R25.2 TRISMUS: ICD-10-CM

## 2024-12-04 DIAGNOSIS — R13.11 ORAL PHASE DYSPHAGIA: Primary | ICD-10-CM

## 2024-12-04 DIAGNOSIS — K02.9 CARIES INVOLVING MULTIPLE SURFACES OF TOOTH: ICD-10-CM

## 2024-12-04 DIAGNOSIS — E44.0 MODERATE PROTEIN-CALORIE MALNUTRITION: ICD-10-CM

## 2024-12-04 DIAGNOSIS — K12.33 MUCOSITIS DUE TO RADIATION THERAPY: ICD-10-CM

## 2024-12-04 DIAGNOSIS — Z85.819 HISTORY OF PHARYNGEAL CANCER: ICD-10-CM

## 2024-12-04 DIAGNOSIS — K05.10 GINGIVITIS: ICD-10-CM

## 2024-12-04 DIAGNOSIS — E03.9 ACQUIRED HYPOTHYROIDISM: ICD-10-CM

## 2024-12-04 NOTE — PROGRESS NOTES
Alli Card Jr, MD  [facility]  [address], [address]  [facility] [address]  Fax [phone]  Phone [phone]      Visit Type: FOLLOW UP   Chief Complaint   Patient presents with    RECHECK MOUTH     HE SAYS HIS MOUTH FEELS FULL, HAS TO FREQUENTLY RINSE HIS MOUTH           HISTORY OBTAINED FROM: patient  Accompanied by: friend  HPI  He presents for a follow up evaluation. Swallowing  Still Cold and feels bad.   He is still eating. Not using G tube much. He says difficult to use.    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    Sleep apnea     Throat cancer     Thyroid disorder     Tinnitus 2020    TMJ dysfunction 2020 wear        Past Surgical History:   Procedure Laterality Date    CATARACT EXTRACTION WITH INTRAOCULAR LENS IMPLANT Bilateral     pt states x3 surgeries    ENDOSCOPY N/A 9/19/2024    Procedure: ESOPHAGOGASTRODUODENOSCOPY WITH PERCUTANEOUS ENDOSCOPIC GASTROSTOMY TUBE INSERTION WITH ANESTHESIA;  Surgeon: Anny Pearce MD;  Location: [facility] ENDOSCOPY;  Service: General;  Laterality: N/A;  Pre: Peg tube insertion;  Post: Peg tube inserted;  Fortino Sierra, DO    GTUBE INSERTION      LARYNGOSCOPY Bilateral 07/07/2023    Procedure: MICRODIRECT LARYNGOSCOPY;  Surgeon: Alli Card Jr., MD;  Location: [facility] OR;  Service: ENT;  Laterality: Bilateral;     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    MANDIBLE SURGERY  2021    removal of necrotic jaw (part of 1 of 2 surgeries)    MASTECTOMY Left     removal of breast due to benign growth    PANENDOSCOPY Bilateral 07/07/2023    Procedure: DIRECT LARYNGOSCOPY, ESOPHAGOSCOPY, BRONCHOSCOPY, MICRODIRECT LARYNGOSCOPY,OR ESOPHAGEAL DILATATION 38-48;  Surgeon: Alli Card Jr., MD;  Location: [facility];  Service: ENT;  Laterality: Bilateral;    THROAT SURGERY  2007    cancerous mass removed       Family History: His family history includes Cancer in his mother; Colon cancer in his mother; Dementia in his mother; Hypertension in his mother.     Social History: He  reports that he has never smoked. He has never been exposed to tobacco smoke. He has never used smokeless tobacco. He reports current alcohol use of about 3.0 standard drinks of alcohol per week. He reports that he does not use drugs.    Home Medications:  Senna, acetaminophen, cycloSPORINE, donepezil, levothyroxine, nystatin, rivaroxaban, venlafaxine XR, and zolpidem    Allergies:  He has No Known Allergies.       Vital Signs:   Temp:  [98.4 °F (36.9 °C)] 98.4 °F (36.9 °C)  Heart Rate:  [85] 85  BP: (123)/(81) 123/81  ENT Physical Exam  Constitutional  Appearance: patient appears well-developed, cachectic, patient is cooperative;  Communication/Voice: communication appropriate for developmental age; speech dysarthric; Communication comments: NPescape and dysarthria  Constitutional comments: Frail  Head and Face  Appearance: head appears normal, face appears normal and face appears atraumatic;  Palpation: facial palpation normal;  Salivary: glands normal;  Ear  Hearing: intact;  Auricles: bilateral auricles normal;  External Mastoids: right external mastoid normal; left external mastoid normal;  Ear Canals: bilateral ear canals normal;  Tympanic Membranes: bilateral tympanic membranes normal;  Nose  External Nose: nares patent bilaterally;  external nose normal;  Internal Nose: nasal mucosa normal; Nasal mucosa comments: R side mid synecia, very thin   nasal septal deviation present; deviation is to the left, septal deviation is severe; Septum comments: R to L mid severe   bilateral inferior turbinates edematous and erythematous;  Oral Cavity/Oropharynx  Lips: normal;  Teeth: missing teeth (L mandible with impnat) noted; dental caries present;  Gums: gingival recession and edema present; Gum comments: gingivits  Tongue: surface is coated and smooth; Oral Tongue comments: very dry  Oral mucosa: with mucosal erythema present; buccal mucosa Buccal Mucosa comments: thickened secretions, Left inf sullcus w food collecting  Hard palate: normal;  Soft palate: normal;  Tonsils: bilateral tonsils 1+, Tonsil comments: dry and coated  OC/OP comments: Trismus-opens to approximately 18 mm at the incisors  Neck  Neck: scars (Left neck dissection scar, well-healed) present; neck asymmetric (Status post left neck dissection); no neck mass present;  Thyroid: no thyroid mass present;  Neck comments: Left neck, well-healed  Very atrophic musculature of the right neck  Brawny induration left greater than right  Poor laryngeal elevation  Banding of neck muscles  Respiratory  Inspection: breathing unlabored; normal breathing rate;  Cardiovascular  Inspection: extremities are warm and well perfused; no peripheral edema present;  Lymphatic  Palpation: no cervical adenopathy noted;  Neurovestibular  Mental Status: alert and oriented;  Psychiatric: mood normal; affect is appropriate;  Drawings                Result Review       RESULTS REVIEW    I have reviewed the patients old records in the chart.   I have reviewed the patients old records in the chart.        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  BMI indicates mild protein-calorie malnutrition with health conditions related to an underlying condition that include myofascial pain, trismus. Weight issue is unchanged. BMI is is below average; no BMI management plan is appropriate.  BMI management for patient includes the following: referral to primary care.  Trismus  No improvement  Dysarthria  No improvement  Lymphedema  Stable  Caries involving multiple surfaces of tooth  No improvement  Acquired hypothyroidism  Stable  Gingivitis  Moderate to severe especially on the mandible            Continue current management plan.  Patient continues to have post radiation dysfunction and myofascial pain.  He has severe trismus.  He is not responding well to therapy.  He should continue swallowing therapy.  However, his prognosis is poor.  I feel this is related to his radiation therapy as well as his multiple surgical procedures.  The patient also has caries with broken teeth.  These need to be addressed before he has osteonecrosis of the mandible.  I recommended he see a dentist or an oral surgeon as soon as possible.  He is having some problem getting to these because of his insurance.  He does have significant gingivitis that requires therapy.  Dental referral  Continue swallowing therapy  Continue stretching mastication muscles  Continue G-tube feeds as well as oral feeds  Call for problems    My Chart:  Patient is using My Chart    Patient understand(s) and agree(s) with the treatment plan as described.    Return in about 2 months (around 2/4/2025) for Recheck Swallowing and trismus.        Electronically signed by Alli Card Jr, MD 12/04/24 10:11 AM CST.

## 2024-12-04 NOTE — PATIENT INSTRUCTIONS
Continue oral intake and G tube feedings    Salt water gargles     Dental referral        CONTACT INFORMATION:  The main office phone number is [phone]. For emergencies after hours and on weekends, this number will convert over to our answering service and the on call provider will answer. Please try to keep non emergent phone calls/ questions to office hours 9am-5pm Monday through Friday.     [employer]  As an alternative, you can sign up and use the Epic MyChart system for more direct and quicker access for non emergent questions/ problems.  [facility] [employer] allows you to send messages to your doctor, view your test results, renew your prescriptions, schedule appointments, and more. To sign up, go to [employer] and click on the Sign Up Now link in the New User? box. Enter your [employer] Activation Code exactly as it appears below along with the last four digits of your Social Security Number and your Date of Birth () to complete the sign-up process. If you do not sign up before the expiration date, you must request a new code.    [employer] Activation Code: Activation code not generated  Current [employer] Status: Active    If you have questions, you can email [email or web address] or call [phone] to talk to our [employer] staff. Remember, [employer] is NOT to be used for urgent needs. For medical emergencies, dial 911.

## 2024-12-04 NOTE — ASSESSMENT & PLAN NOTE
Patient's Body mass index is 17.18 kg/m².  BMI indicates mild protein-calorie malnutrition with health conditions related to an underlying condition that include myofascial pain, trismus. Weight issue is unchanged. BMI is is below average; no BMI management plan is appropriate.  BMI management for patient includes the following: referral to primary care.

## 2024-12-04 NOTE — LETTER
December 4, 2024     Fortino Sierra DO  [address]  [address]  [facility] [place] [address]    Patient: Fortino Dennis   YOB: 1950   Date of Visit: 12/4/2024     Dear Fortino Sierra DO:       Thank you for referring Fortino Dennis to me for evaluation. Below are the relevant portions of my assessment and plan of care.    If you have questions, please do not hesitate to call me. I look forward to following Fortino along with you.         Sincerely,        Alli Card Jr, MD        CC: No Recipients    Alli Card Jr., MD  12/04/24 1354  Sign when Signing Visit      Alli Card Jr, MD  [facility] EAR NOSE & THROAT  [address], [address]  [facility] [address]  Fax [phone]  Phone [phone]      Visit Type: FOLLOW UP   Chief Complaint   Patient presents with   • RECHECK MOUTH     HE SAYS HIS MOUTH FEELS FULL, HAS TO FREQUENTLY RINSE HIS MOUTH           HISTORY OBTAINED FROM: patient  Accompanied by: friend  HPI  He presents for a follow up evaluation. Swallowing  Still Cold and feels bad.   He is still eating. Not using G tube much. He says difficult to use.    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   • Sleep apnea    • Throat cancer    • Thyroid disorder    • Tinnitus 2020   • TMJ dysfunction 2020 wear        Past Surgical History:    Procedure Laterality Date   • CATARACT EXTRACTION WITH INTRAOCULAR LENS IMPLANT Bilateral     pt states x3 surgeries   • ENDOSCOPY N/A 9/19/2024    Procedure: ESOPHAGOGASTRODUODENOSCOPY WITH PERCUTANEOUS ENDOSCOPIC GASTROSTOMY TUBE INSERTION WITH ANESTHESIA;  Surgeon: Anny Pearce MD;  Location: [facility] ENDOSCOPY;  Service: General;  Laterality: N/A;  Pre: Peg tube insertion;  Post: Peg tube inserted;  Fortino Sierra, DO   • GTUBE INSERTION     • LARYNGOSCOPY Bilateral 07/07/2023    Procedure: MICRODIRECT LARYNGOSCOPY;  Surgeon: Alli Card Jr., MD;  Location: [facility] OR;  Service: ENT;  Laterality: Bilateral;   • 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   • MANDIBLE SURGERY  2021    removal of necrotic jaw (part of 1 of 2 surgeries)   • MASTECTOMY Left     removal of breast due to benign growth   • PANENDOSCOPY Bilateral 07/07/2023    Procedure: DIRECT LARYNGOSCOPY, ESOPHAGOSCOPY, BRONCHOSCOPY, MICRODIRECT LARYNGOSCOPY,OR ESOPHAGEAL DILATATION 38-48;  Surgeon: Alli Card Jr., MD;  Location: [facility] OR;  Service: ENT;  Laterality: Bilateral;   • THROAT SURGERY  2007    cancerous mass removed       Family History: His family history includes Cancer in his mother; Colon cancer in his mother; Dementia in his mother; Hypertension in his mother.     Social History: He  reports that he has never smoked. He has never been exposed to tobacco smoke. He has never used smokeless tobacco. He reports current alcohol use of about 3.0 standard drinks of alcohol per week. He reports that he does not use drugs.    Home Medications:  Senna, acetaminophen, cycloSPORINE, donepezil, levothyroxine, nystatin, rivaroxaban, venlafaxine XR, and zolpidem    Allergies:  He has No Known Allergies.       Vital Signs:   Temp:  [98.4 °F (36.9 °C)] 98.4 °F (36.9 °C)  Heart Rate:  [85] 85  BP: (123)/(81) 123/81  ENT Physical Exam  Constitutional  Appearance: patient appears well-developed,  cachectic, patient is cooperative;  Communication/Voice: communication appropriate for developmental age; speech dysarthric; Communication comments: NPescape and dysarthria  Constitutional comments: Frail  Head and Face  Appearance: head appears normal, face appears normal and face appears atraumatic;  Palpation: facial palpation normal;  Salivary: glands normal;  Ear  Hearing: intact;  Auricles: bilateral auricles normal;  External Mastoids: right external mastoid normal; left external mastoid normal;  Ear Canals: bilateral ear canals normal;  Tympanic Membranes: bilateral tympanic membranes normal;  Nose  External Nose: nares patent bilaterally; external nose normal;  Internal Nose: nasal mucosa normal; Nasal mucosa comments: R side mid synecia, very thin   nasal septal deviation present; deviation is to the left, septal deviation is severe; Septum comments: R to L mid severe   bilateral inferior turbinates edematous and erythematous;  Oral Cavity/Oropharynx  Lips: normal;  Teeth: missing teeth (L mandible with impnat) noted; dental caries present;  Gums: gingival recession and edema present; Gum comments: gingivits  Tongue: surface is coated and smooth; Oral Tongue comments: very dry  Oral mucosa: with mucosal erythema present; buccal mucosa Buccal Mucosa comments: thickened secretions, Left inf sullcus w food collecting  Hard palate: normal;  Soft palate: normal;  Tonsils: bilateral tonsils 1+, Tonsil comments: dry and coated  OC/OP comments: Trismus-opens to approximately 18 mm at the incisors  Neck  Neck: scars (Left neck dissection scar, well-healed) present; neck asymmetric (Status post left neck dissection); no neck mass present;  Thyroid: no thyroid mass present;  Neck comments: Left neck, well-healed  Very atrophic musculature of the right neck  Brawny induration left greater than right  Poor laryngeal elevation  Banding of neck muscles  Respiratory  Inspection: breathing unlabored; normal breathing  rate;  Cardiovascular  Inspection: extremities are warm and well perfused; no peripheral edema present;  Lymphatic  Palpation: no cervical adenopathy noted;  Neurovestibular  Mental Status: alert and oriented;  Psychiatric: mood normal; affect is appropriate;  Drawings                Result Review      RESULTS REVIEW    I have reviewed the patients old records in the chart.   I have reviewed the patients old records in the chart.        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  BMI indicates mild protein-calorie malnutrition with health conditions related to an underlying condition that include myofascial pain, trismus. Weight issue is unchanged. BMI is is below average; no BMI management plan is appropriate.  BMI management for patient includes the following: referral to primary care.  Trismus  No improvement  Dysarthria  No improvement  Lymphedema  Stable  Caries involving multiple surfaces of tooth  No improvement  Acquired hypothyroidism  Stable  Gingivitis  Moderate to severe especially on the mandible            Continue current management plan.  Patient continues to have post radiation dysfunction and myofascial pain.  He has severe trismus.  He is not responding well to therapy.  He should continue swallowing therapy.  However, his prognosis is poor.  I feel this is related to his radiation therapy as well as his multiple surgical procedures.  The patient also has caries with broken teeth.  These need to be addressed before he has osteonecrosis of the mandible.  I recommended he see a dentist or an oral surgeon as soon as possible.  He is having some problem getting to these because of his insurance.  He does have significant gingivitis that requires  therapy.  Dental referral  Continue swallowing therapy  Continue stretching mastication muscles  Continue G-tube feeds as well as oral feeds  Call for problems    My Chart:  Patient is using My Chart    Patient understand(s) and agree(s) with the treatment plan as described.    Return in about 2 months (around 2/4/2025) for Recheck Swallowing and trismus.        Electronically signed by Alli Card Jr, MD 12/04/24 10:11 AM CST.

## 2024-12-05 ENCOUNTER — HOSPITAL ENCOUNTER (OUTPATIENT)
Dept: PHYSICAL THERAPY | Facility: HOSPITAL | Age: 74
Setting detail: THERAPIES SERIES
Discharge: HOME | End: 2024-12-05
Payer: MEDICARE

## 2024-12-05 DIAGNOSIS — I89.0 LYMPHEDEMA: Primary | ICD-10-CM

## 2024-12-05 DIAGNOSIS — Z85.819 HISTORY OF PHARYNGEAL CANCER: ICD-10-CM

## 2024-12-05 PROCEDURE — 97140 MANUAL THERAPY 1/> REGIONS: CPT

## 2024-12-05 NOTE — THERAPY TREATMENT NOTE
Physical Therapy Treatment Note and 30 Day Progress Note  [address] [place] [address]    Patient: Fortino Dennis                                                 Visit Date: 2024  :     1950    Referring practitioner:    Alli Card Jr*  Date of Initial Visit:          Type: THERAPY  Episode: lymphedema        Visit Diagnoses:    ICD-10-CM ICD-9-CM   1. Lymphedema  I89.0 457.1   2. History of pharyngeal cancer  Z85.819 V10.02     SUBJECTIVE     Subjective:  He has been working on the neck and using the massager.     PAIN: 0/10         OBJECTIVE     Objective    Lymphedema       Row Name 24 [id number]             Subjective Pain    Able to rate subjective pain? yes  -AL      Pre-Treatment Pain Level 0  -AL      Post-Treatment Pain Level 0  -AL         Lymphedema Measurements    Measurement Type(s) Circumferential  -AL      Circumferential Areas Neck  -AL         Head Circumferential (cm)    Measurement Location 1 upper lip  -AL      Head 1 45.4 cm  -AL      Measurement Location 2 lower lip  -AL      Head 2 44.1 cm  -AL      Head Circumferential Total 89.5 cm  -AL         Neck Circumferential (cm)    Measurement Location 1 vinod's apple  -AL      Neck 1 34 cm  -AL      Neck Circumferential Total 34 cm  -AL         Manual Lymphatic Drainage    Manual Lymphatic Drainage initial sequence;head/neck treatment;opened regional lymph nodes  -AL      Initial Sequence full neck;abdomen;diaphragmatic breathing  -AL      Abdomen superficial  -AL      Diaphragmatic Breathing x 9 with abdomen  -AL      Opened Regional Lymph Nodes axillary  -AL      Axillary right;left  -AL      Head/Neck Treatment pre-auricular nodes;post-auricular nodes;occipital nodes;sublingual nodes;full/complex MLD  -AL      Full/Complex MLD Stretched neck into lateral flexion and rotation  -AL      Manual Lymphatic Drainage Comments Used the hand held massager  over the anterior neck including the scar tissue.  -AL      Manual Therapy 45  -AL                User Key  (r) = Recorded By, (t) = Taken By, (c) = Cosigned By      Initials Name Provider Type    Pastora Serrano PTA, CLT-JULIA Physical Therapist Assistant                        Therapy Education/Self Care [id number]   Education offered today Order a size M neck compression garment.   Medbridge Code    Ongoing HEP   Cont self massage and stretches   Timed Minutes        Total Timed Treatment:     45   mins  Total Time of Visit:             45   mins         ASSESSMENT/PLAN     GOALS  Goals                                          Progress Note due by 1/4/25                                                      Recert due by 1/20/24   STG by: 4 weeks Comments Date Status   Patient will have a good basic understanding of lymphedema and its suggested risk reduction practices Continue to educate each treatment.  He has a good understanding. 10/23 Met/Ongoing   Patient will be independent with remedial HEP for lymphedema He is working on stretching at home. 12/5 Partially met   Patient will be independent with self MLD for lymphedema He works on IASTM with the hand held massager, he will do follow up MLD. 10/23 Met             LTG by: 8 Weeks         Patient will have appropriate compression garments for lymphedema maintenance He will order the compression garment 12/5 Ongoing   Patient will have no sign or symptoms of infection  No open areas no weeping 9/4 Met   Patient will be independent with comprehensive maintenance program for lymphedema Working towards his home maintenance program.  12/5 Ongoing                                         Assessment/Plan     ASSESSMENT:   The face measurement below the lip has reduced, no change above the lip.  He has had an increase of 1.3 cm at the [employer] apple.  We did not have availability on our schedule so the increase may be due to us not being able to see patient. Patient will  order a size M neck compression garment from [employer], I did print out the information for him. He continues to have neck tightness.  He needs to work on MLD mor often to the anterior neck.     PLAN:   Will see patient 1 x a week x 6 weeks for MLD.  Assess any compression patient has next treatment.     SIGNATURE: Pastora Hamlin PTA, [facility] [place] License #: [id number]  Electronically Signed on 12/5/2024        [address] [place] [address]. [address]  [phone]

## 2024-12-06 NOTE — THERAPY TREATMENT NOTE
Patient: Fortino Dennis           : 1950  Visit Date: 2024  Referring practitioner: Alli Card Jr*  Date of Initial Visit: Type: THERAPY  Episode: lymphedema    Visit Diagnoses:    ICD-10-CM ICD-9-CM   1. Lymphedema  I89.0 457.1   2. History of pharyngeal cancer  Z85.819 V10.02          Clinical Progress: unchanged  Home Program Compliance: Yes  Progress toward previous goals: Partially Met  Prognosis to achieve goals: good    Objective     Assessment & Plan       Assessment  Impairments: lacks appropriate home exercise program   Other impairment: edema  Prognosis: good    Plan  Therapy options: will be seen for skilled therapy services  Planned therapy interventions: manual therapy, soft tissue mobilization, therapeutic activities and home exercise program  Frequency: 1x week  Duration in weeks: 12  Treatment plan discussed with: MARIPOSA        Anticipated CPT codes: Therapeutic Exercise 75192, Manual Therapy 52022, and Self Care/Home Management 63535    I reviewed the treatment and goals with Sultana Hamlin PTA and agree with the POC.    SIGNATURE: Rory Munoz PT, License #: [id number]  Electronically Signed on 2024

## 2024-12-09 DIAGNOSIS — G47.00 INSOMNIA, UNSPECIFIED TYPE: ICD-10-CM

## 2024-12-09 RX ORDER — ZOLPIDEM TARTRATE 10 MG/1
10 TABLET ORAL NIGHTLY PRN
Qty: 30 TABLET | Refills: 1 | Status: SHIPPED | OUTPATIENT
Start: 2024-12-09

## 2024-12-09 NOTE — TELEPHONE ENCOUNTER
Caller: Fortino Dennis    Relationship: Self    Best call back number:     [phone]       Requested Prescriptions:   Requested Prescriptions     Pending Prescriptions Disp Refills    zolpidem (AMBIEN) 10 MG tablet 30 tablet 1     Sig: Take 1 tablet by mouth At Night As Needed for Sleep.        Pharmacy where request should be sent: [id number] [place] [facility] [address] [facility] [phone] [facility] [phone]      Last office visit with prescribing clinician: 11/7/2024   Last telemedicine visit with prescribing clinician: Visit date not found   Next office visit with prescribing clinician: 2/13/2025     Additional details provided by patient: PATIENT IS COMPLETELY OUT    Does the patient have less than a 3 day supply:  [x] Yes  [] No    Would you like a call back once the refill request has been completed: [x] Yes [] No    If the office needs to give you a call back, can they leave a voicemail: [x] Yes [] No    Gamaliel Ferrell Rep   12/09/24 15:19 CST

## 2024-12-11 ENCOUNTER — HOSPITAL ENCOUNTER (OUTPATIENT)
Dept: PHYSICAL THERAPY | Facility: HOSPITAL | Age: 74
Setting detail: THERAPIES SERIES
Discharge: HOME | End: 2024-12-11
Payer: MEDICARE

## 2024-12-11 DIAGNOSIS — Z85.819 HISTORY OF PHARYNGEAL CANCER: ICD-10-CM

## 2024-12-11 DIAGNOSIS — I89.0 LYMPHEDEMA: Primary | ICD-10-CM

## 2024-12-11 PROCEDURE — 97140 MANUAL THERAPY 1/> REGIONS: CPT

## 2024-12-11 NOTE — THERAPY TREATMENT NOTE
Physical Therapy Treatment Note  [address] [place] [address]    Patient: Fortino Dennis                                                 Visit Date: 2024  :     1950    Referring practitioner:    Alli Card Jr*  Date of Initial Visit:          Type: THERAPY  Episode: lymphedema        Visit Diagnoses:    ICD-10-CM ICD-9-CM   1. Lymphedema  I89.0 457.1   2. History of pharyngeal cancer  Z85.819 V10.02     SUBJECTIVE     Subjective:  He has been working on the neck and scar the past week.  No pain today.     PAIN: 0/10         OBJECTIVE     Objective    Lymphedema       Row Name 24 0320             Subjective Pain    Able to rate subjective pain? yes  -AL      Pre-Treatment Pain Level 0  -AL      Post-Treatment Pain Level 0  -AL         Manual Lymphatic Drainage    Manual Lymphatic Drainage initial sequence;head/neck treatment;opened regional lymph nodes  -AL      Initial Sequence full neck;abdomen;diaphragmatic breathing  -AL      Abdomen superficial  -AL      Diaphragmatic Breathing x 9 with abdomen  -AL      Opened Regional Lymph Nodes axillary  -AL      Axillary right;left  -AL      Head/Neck Treatment pre-auricular nodes;post-auricular nodes;occipital nodes;sublingual nodes;full/complex MLD  -AL      Full/Complex MLD Stretched neck into lateral flexion and rotation  -AL      Manual Lymphatic Drainage Comments Used the hand held massager over the anterior neck including the scar tissue.  -AL      Manual Therapy 40  -AL         Compression/Skin Care    Compression/Skin Care Comments Order compression garment  -AL                User Key  (r) = Recorded By, (t) = Taken By, (c) = Cosigned By      Initials Name Provider Type    AL Pastora Hamlin, PTA, MOT-JULIA Physical Therapist Assistant                          Therapy Education/Self Care [id number]   Education offered today Order a size M neck compression garment.  Work on MLD.   Medbridge Code    Ongoing HEP   Cont self massage and stretches   Timed Minutes        Total Timed Treatment:     40   mins  Total Time of Visit:             40   mins         ASSESSMENT/PLAN     GOALS  Goals                                          Progress Note due by 1/4/25                                                      Recert due by 1/20/24   STG by: 4 weeks Comments Date Status   Patient will have a good basic understanding of lymphedema and its suggested risk reduction practices Continue to educate each treatment.  He has a good understanding. 10/23 Met/Ongoing   Patient will be independent with remedial HEP for lymphedema He is working on stretching at home. 12/5 Partially met   Patient will be independent with self MLD for lymphedema He works on IASTM with the hand held massager, he will do follow up MLD. 10/23 Met             LTG by: 8 Weeks         Patient will have appropriate compression garments for lymphedema maintenance He will order the compression garment 12/11 Ongoing   Patient will have no sign or symptoms of infection  No open areas no weeping 9/4 Met   Patient will be independent with comprehensive maintenance program for lymphedema Working towards his home maintenance program.  12/5 Ongoing                                         Assessment/Plan     ASSESSMENT:   He has been working on the MLD and scar tissue massage, he will order the neck compression. He did have a very small amount of bleeding at the incision ling on the R anterior neck, he thinks this is from shaving.  The scar was looser today and the swelling was less as compared to his last treatment.     PLAN:   Will see patient 1 x a week x 6 weeks for MLD.  Assess any compression patient has next treatment.     SIGNATURE: Pastora Hamlin PTA, [facility] [place] License #: [id number]  Electronically Signed on 12/11/2024        [address] [place] [address]. [address]  [phone]

## 2024-12-13 ENCOUNTER — HOSPITAL ENCOUNTER (OUTPATIENT)
Dept: PHYSICAL THERAPY | Facility: HOSPITAL | Age: 74
Setting detail: THERAPIES SERIES
Discharge: HOME | End: 2024-12-13
Payer: MEDICARE

## 2024-12-13 DIAGNOSIS — Z85.819 HISTORY OF PHARYNGEAL CANCER: ICD-10-CM

## 2024-12-13 DIAGNOSIS — I89.0 LYMPHEDEMA: Primary | ICD-10-CM

## 2024-12-13 PROCEDURE — 97140 MANUAL THERAPY 1/> REGIONS: CPT

## 2024-12-13 NOTE — THERAPY TREATMENT NOTE
Physical Therapy Treatment Note  [address] [place] [address]    Patient: Fortino Dennis                                                 Visit Date: 2024  :     1950    Referring practitioner:    Alli Card Jr*  Date of Initial Visit:          Type: THERAPY  Episode: lymphedema        Visit Diagnoses:    ICD-10-CM ICD-9-CM   1. Lymphedema  I89.0 457.1   2. History of pharyngeal cancer  Z85.819 V10.02       SUBJECTIVE     Subjective:  He has not ordered the compression yet.     PAIN: 0/10         OBJECTIVE     Objective    Lymphedema       Row Name 24 1430             Subjective Pain    Able to rate subjective pain? yes  -AL      Pre-Treatment Pain Level 0  -AL      Post-Treatment Pain Level 0  -AL         Manual Lymphatic Drainage    Manual Lymphatic Drainage initial sequence;head/neck treatment;opened regional lymph nodes  -AL      Initial Sequence full neck;abdomen;diaphragmatic breathing  -AL      Abdomen superficial  -AL      Diaphragmatic Breathing x 9 with abdomen  -AL      Opened Regional Lymph Nodes axillary  -AL      Axillary right;left  -AL      Head/Neck Treatment pre-auricular nodes;post-auricular nodes;occipital nodes;sublingual nodes;full/complex MLD  -AL      Full/Complex MLD Stretched neck into lateral flexion and rotation  -AL      Manual Lymphatic Drainage Comments Used the hand held massager over the anterior neck including the scar tissue.  -AL      Manual Therapy 40  -AL         Compression/Skin Care    Compression/Skin Care Comments Order compression  -AL                User Key  (r) = Recorded By, (t) = Taken By, (c) = Cosigned By      Initials Name Provider Type    AL Pastora Hamlin, PTA, CLT-JULIA Physical Therapist Assistant                          Therapy Education/Self Care [id number]   Education offered today Order a size M neck compression garment. Work on MLD.   [employer] Code     Ongoing HEP   Cont self massage and stretches   Timed Minutes        Total Timed Treatment:     40   mins  Total Time of Visit:             40   mins         ASSESSMENT/PLAN     GOALS  Goals                                          Progress Note due by 1/4/25                                                      Recert due by 1/20/24   STG by: 4 weeks Comments Date Status   Patient will have a good basic understanding of lymphedema and its suggested risk reduction practices Continue to educate each treatment.  He has a good understanding. 10/23 Met/Ongoing   Patient will be independent with remedial HEP for lymphedema He is working on stretching at home. 12/5 Partially met   Patient will be independent with self MLD for lymphedema He works on IASTM with the hand held massager, he will do follow up MLD. 10/23 Met             LTG by: 8 Weeks         Patient will have appropriate compression garments for lymphedema maintenance He will order the compression garment 12/13 Ongoing   Patient will have no sign or symptoms of infection  No open areas no weeping 9/4 Met   Patient will be independent with comprehensive maintenance program for lymphedema Working towards his home maintenance program.  12/5 Ongoing                                         Assessment/Plan     ASSESSMENT:   He has not ordered the compression yet, stressed importance of ordering the compression. He has not turned in any paperwork for the pump.  He is working on stretching his neck at home, more so with rotation.     PLAN:   Will see patient 1 x a week x 6 weeks for MLD.  Assess any compression patient has next treatment.     SIGNATURE: Pastora Hamlin PTA, [employer] [place] License #: [id number]  Electronically Signed on 12/13/2024        [address] [place] [address]. [address]  [phone]

## 2024-12-18 ENCOUNTER — HOSPITAL ENCOUNTER (OUTPATIENT)
Dept: PHYSICAL THERAPY | Facility: HOSPITAL | Age: 74
Setting detail: THERAPIES SERIES
Discharge: HOME | End: 2024-12-18
Payer: MEDICARE

## 2024-12-18 DIAGNOSIS — Z85.819 HISTORY OF PHARYNGEAL CANCER: ICD-10-CM

## 2024-12-18 DIAGNOSIS — I89.0 LYMPHEDEMA: Primary | ICD-10-CM

## 2024-12-18 PROCEDURE — 97140 MANUAL THERAPY 1/> REGIONS: CPT | Performed by: PHYSICAL THERAPIST

## 2024-12-18 NOTE — THERAPY TREATMENT NOTE
Physical Therapy Treatment Note  [address] [place] [address]    Patient: Fortino Dennis                                                 Visit Date: 2024  :     1950    Referring practitioner:    Alli Card Jr*  Date of Initial Visit:          Type: THERAPY  Episode: lymphedema        Visit Diagnoses:    ICD-10-CM ICD-9-CM   1. Lymphedema  I89.0 457.1   2. History of pharyngeal cancer  Z85.819 V10.02       SUBJECTIVE     Subjective:  He has not ordered the compression yet.     PAIN: 0/10         OBJECTIVE     Objective    Lymphedema       Row Name 24 1300             Subjective Pain    Able to rate subjective pain? yes  -HR      Pre-Treatment Pain Level 0  -HR         Manual Lymphatic Drainage    Manual Lymphatic Drainage initial sequence;head/neck treatment;opened regional lymph nodes  -HR      Initial Sequence full neck;abdomen;diaphragmatic breathing  -HR      Abdomen superficial  -HR      Opened Regional Lymph Nodes axillary  -HR      Axillary right;left  -HR      Head/Neck Treatment pre-auricular nodes;post-auricular nodes;occipital nodes;sublingual nodes;full/complex MLD  -HR      Full/Complex MLD Stretched neck into lateral flexion and rotation  -HR      Manual Lymphatic Drainage Comments Used the hand held massager over the anterior neck including the scar tissue.  -HR      Manual Therapy 40  -HR                User Key  (r) = Recorded By, (t) = Taken By, (c) = Cosigned By      Initials Name Provider Type    HR Ellen Rosenbaum, PT, DPT, CLT-JULIA Physical Therapist                          Therapy Education/Self Care 56408   Education offered today Order a size M neck compression garment. Work on MLD.   [employer] Code    Ongoing HEP   Cont self massage and stretches   Timed Minutes        Total Timed Treatment:     40   mins  Total Time of Visit:             40   mins         ASSESSMENT/PLAN      GOALS  Goals                                          Progress Note due by 1/4/25                                                      Recert due by 1/20/24   STG by: 4 weeks Comments Date Status   Patient will have a good basic understanding of lymphedema and its suggested risk reduction practices Continue to educate each treatment.  He has a good understanding. 10/23 Met/Ongoing   Patient will be independent with remedial HEP for lymphedema He is working on stretching at home. 12/5 Partially met   Patient will be independent with self MLD for lymphedema He works on IASTM with the hand held massager, he will do follow up MLD. 10/23 Met             LTG by: 8 Weeks         Patient will have appropriate compression garments for lymphedema maintenance He will order the compression garment 12/13 Ongoing   Patient will have no sign or symptoms of infection  No open areas no weeping 9/4 Met   Patient will be independent with comprehensive maintenance program for lymphedema Working towards his home maintenance program.  12/5 Ongoing                                         Assessment/Plan     ASSESSMENT:   He has not ordered the compression yet, stressed importance of ordering the compression. He has not turned in any paperwork for the pump.  He is working on stretching his neck at home, more so with rotation.     PLAN:   Will see patient 1 x a week x 6 weeks for MLD.  See if he has the compression      SIGNATURE: Ellen Rosenbaum, PT, DPT, [employer] [facility] License #: [id number]  Electronically Signed on 12/18/2024        [address] [place] [address], [place] [address]  [phone]

## 2024-12-20 ENCOUNTER — APPOINTMENT (OUTPATIENT)
Dept: PHYSICAL THERAPY | Facility: HOSPITAL | Age: 74
End: 2024-12-20
Payer: MEDICARE

## 2024-12-27 ENCOUNTER — APPOINTMENT (OUTPATIENT)
Dept: PHYSICAL THERAPY | Facility: HOSPITAL | Age: 74
End: 2024-12-27
Payer: MEDICARE

## 2025-01-02 ENCOUNTER — APPOINTMENT (OUTPATIENT)
Dept: PHYSICAL THERAPY | Facility: HOSPITAL | Age: 75
End: 2025-01-02
Payer: MEDICARE

## 2025-01-22 ENCOUNTER — APPOINTMENT (OUTPATIENT)
Dept: PHYSICAL THERAPY | Facility: HOSPITAL | Age: 75
End: 2025-01-22
Payer: MEDICARE

## 2025-01-29 ENCOUNTER — HOSPITAL ENCOUNTER (OUTPATIENT)
Dept: PHYSICAL THERAPY | Facility: HOSPITAL | Age: 75
Setting detail: THERAPIES SERIES
Discharge: HOME | End: 2025-01-29
Payer: MEDICARE

## 2025-01-29 DIAGNOSIS — Z85.819 HISTORY OF PHARYNGEAL CANCER: ICD-10-CM

## 2025-01-29 DIAGNOSIS — I89.0 LYMPHEDEMA: Primary | ICD-10-CM

## 2025-01-29 PROCEDURE — 97140 MANUAL THERAPY 1/> REGIONS: CPT

## 2025-01-29 NOTE — THERAPY TREATMENT NOTE
Physical Therapy Treatment Note, 30 Day Progress Note, and 90 Day Recertification Note  [address] [place] [address]    Patient: Fortino Dennis                                                 Visit Date: 2025  :     1950    Referring practitioner:    Alli Card Jr*  Date of Initial Visit:          Type: THERAPY  Episode: lymphedema        Visit Diagnoses:    ICD-10-CM ICD-9-CM   1. Lymphedema  I89.0 457.1   2. History of pharyngeal cancer  Z85.819 V10.02       SUBJECTIVE     Subjective:  He hasn't down as much with the stretching, he knows he needs to do more.     PAIN: 0/10         OBJECTIVE     Objective    Lymphedema       Row Name 25 1530             Subjective Pain    Able to rate subjective pain? yes  -AL      Pre-Treatment Pain Level 0  -AL      Post-Treatment Pain Level 0  -AL         Manual Lymphatic Drainage    Manual Lymphatic Drainage initial sequence;head/neck treatment;opened regional lymph nodes  -AL      Initial Sequence full neck;abdomen;diaphragmatic breathing  -AL      Abdomen superficial  -AL      Diaphragmatic Breathing x 9 with abdomen  -AL      Opened Regional Lymph Nodes axillary  -AL      Axillary right;left  -AL      Head/Neck Treatment pre-auricular nodes;post-auricular nodes;occipital nodes;sublingual nodes;full/complex MLD  -AL      Full/Complex MLD Stretched neck into lateral flexion and rotation  -AL      Manual Lymphatic Drainage Comments Used the hand held massager over the anterior neck including the scar tissue.  -AL      Manual Therapy 40  -AL                User Key  (r) = Recorded By, (t) = Taken By, (c) = Cosigned By      Initials Name Provider Type    AL Pastora Hamiln, PTA, CLT-JULIA Physical Therapist Assistant                      Therapy Education/Self Care 24081   Education offered today Order a size M neck compression garment. Work on MLD daily   [email or web address] Code     Ongoing HEP   Cont self massage and stretches   Timed Minutes        Total Timed Treatment:     40   mins  Total Time of Visit:             40   mins         ASSESSMENT/PLAN     GOALS  Goals                                          Progress Note due by 2/28/25                                                      Recert due by 4/28/2025   STG by: 4 weeks Comments Date Status   Patient will have a good basic understanding of lymphedema and its suggested risk reduction practices Continue to educate each treatment.  He has a good understanding. 10/23 Met/Ongoing   Patient will be independent with remedial HEP for lymphedema He has worked some on stretching at home 1/29/2025 Partially met   Patient will be independent with self MLD for lymphedema He works on IASTM with the hand held massager, he will do follow up MLD. 10/23 Met             LTG by: 8 Weeks         Patient will have appropriate compression garments for lymphedema maintenance He has not ordered the compression garment 1/29/2025 Ongoing   Patient will have no sign or symptoms of infection  No open areas no weeping 9/4 Met   Patient will be independent with comprehensive maintenance program for lymphedema He needs to work more on his home maintenance program 1/29/2025 Ongoing                                         Assessment/Plan     ASSESSMENT:   He is working some on the MLD and HEP.  His neck is tight today, stressed importance of working on his stretching as well as the MLD at home.  He does not have any compression that he is wearing currently, he has not filled out paperwork for the [employer] pump.  The neck is also more swollen today, after treatment his neck was looser and the scar was not as tight.    PLAN:   Will see patient 1 x a week x 6 weeks for MLD.        SIGNATURE: Pastora Hamlin PTA, LANGJUL[place], [place] License #: [id number]  Electronically Signed on 1/29/2025        [address] [place] [address]. [address]  [phone]

## 2025-02-04 DIAGNOSIS — G47.00 INSOMNIA, UNSPECIFIED TYPE: ICD-10-CM

## 2025-02-04 RX ORDER — ZOLPIDEM TARTRATE 10 MG/1
10 TABLET ORAL NIGHTLY PRN
Qty: 30 TABLET | Refills: 2 | Status: SHIPPED | OUTPATIENT
Start: 2025-02-06

## 2025-02-04 NOTE — TELEPHONE ENCOUNTER
Rx Refill Note  Requested Prescriptions     Pending Prescriptions Disp Refills    zolpidem (AMBIEN) 10 MG tablet [Pharmacy Med Name: ZOLPIDEM 10MG TABLETS] 30 tablet      Sig: TAKE 1 TABLET BY MOUTH AT NIGHT AS NEEDED FOR SLEEP      Last office visit with prescribing clinician: 11/7/2024   Last telemedicine visit with prescribing clinician: Visit date not found   Next office visit with prescribing clinician: Visit date not found                         Would you like a call back once the refill request has been completed: [] Yes [] No    If the office needs to give you a call back, can they leave a voicemail: [] Yes [] No    Lenny Schwarz MA  02/04/25, 14:26 CST

## 2025-02-07 ENCOUNTER — HOSPITAL ENCOUNTER (OUTPATIENT)
Dept: PHYSICAL THERAPY | Facility: HOSPITAL | Age: 75
Setting detail: THERAPIES SERIES
Discharge: HOME | End: 2025-02-07
Payer: MEDICARE

## 2025-02-07 DIAGNOSIS — I89.0 LYMPHEDEMA: Primary | ICD-10-CM

## 2025-02-07 DIAGNOSIS — Z85.819 HISTORY OF PHARYNGEAL CANCER: ICD-10-CM

## 2025-02-07 PROCEDURE — 97140 MANUAL THERAPY 1/> REGIONS: CPT

## 2025-02-07 PROCEDURE — 97110 THERAPEUTIC EXERCISES: CPT

## 2025-02-07 NOTE — THERAPY TREATMENT NOTE
Physical Therapy Treatment Note  [address] [place] [address]    Patient: Fortino Dennis                                                 Visit Date: 2025  :     1950    Referring practitioner:    Alli Card Jr*  Date of Initial Visit:          Type: THERAPY  Episode: lymphedema        Visit Diagnoses:    ICD-10-CM ICD-9-CM   1. Lymphedema  I89.0 457.1   2. History of pharyngeal cancer  Z85.819 V10.02       SUBJECTIVE     Subjective:  He has been working on his neck using the massager.    PAIN: 0/10         OBJECTIVE     Objective    Lymphedema       Row Name 25 1230             Subjective Pain    Able to rate subjective pain? yes  -AL      Pre-Treatment Pain Level 0  -AL      Post-Treatment Pain Level 0  -AL         Manual Lymphatic Drainage    Manual Lymphatic Drainage initial sequence;head/neck treatment;opened regional lymph nodes  -AL      Initial Sequence full neck;abdomen;diaphragmatic breathing  -AL      Abdomen superficial  -AL      Diaphragmatic Breathing x 9 with abdomen  -AL      Opened Regional Lymph Nodes axillary  -AL      Axillary right;left  -AL      Head/Neck Treatment pre-auricular nodes;post-auricular nodes;occipital nodes;sublingual nodes;full/complex MLD  -AL      Manual Lymphatic Drainage Comments Used the hand held massager over the anterior neck including the scar tissue.  -AL      Manual Therapy 44  -AL                User Key  (r) = Recorded By, (t) = Taken By, (c) = Cosigned By      Initials Name Provider Type    AL Pastora Hamlin PTA, CLT-JULIA Physical Therapist Assistant                  Therapeutic Exercises    83831 Units Comments   Stretched neck into rotation and sidebending.       Self stretch into R sidebending  Too much compensating when tried R sidebending   Cervical retraction X 5    UBE x 1 min forward L 2, 1 minute forward level 1, 2 minutes backwards level 1          Timed  Minutes 10          Therapy Education/Self Care 35995   Education offered today Order a size M neck compression garment. Work on MLD daily.  Work on stretching at home for the neck as well as using the hand-held massager over the anterior neck   [employer] Code    Ongoing HEP   Cont self massage and stretches   Timed Minutes        Total Timed Treatment:     54   mins  Total Time of Visit:             54   mins         ASSESSMENT/PLAN     GOALS  Goals                                          Progress Note due by 2/28/25                                                      Recert due by 4/28/2025   STG by: 4 weeks Comments Date Status   Patient will have a good basic understanding of lymphedema and its suggested risk reduction practices Continue to educate each treatment.  He has a good understanding. 10/23 Met/Ongoing   Patient will be independent with remedial HEP for lymphedema He will work on stretching more at home 1/29/2025 Partially met   Patient will be independent with self MLD for lymphedema He works on IASTM with the hand held massager, he will do follow up MLD. 10/23 Met             LTG by: 8 Weeks         Patient will have appropriate compression garments for lymphedema maintenance He has not ordered the compression garment 1/29/2025 Ongoing   Patient will have no sign or symptoms of infection  No open areas no weeping 9/4 Met   Patient will be independent with comprehensive maintenance program for lymphedema He needs to work more on his home maintenance program 1/29/2025 Ongoing                                         Assessment/Plan     ASSESSMENT:   After the MLD and using the hand-held massager over the neck the dense tissue did soften at the anterior neck area.  Patient has trouble raising his left arm to do a self sidebending stretch since he is compensating I am only going to have him do this on the right side.  Today I put him on the upper body bike to help work on shoulder strength as well as  scapular strengthening.  Patient was not able to tolerate level 2 more than 1 minute, we will have to work on slowly increasing the resistance.    PLAN:   Will see patient 1 x a week x 6 weeks for MLD.        SIGNATURE: Pastora Hamlin PTA, [facility] [place] License #: [id number]  Electronically Signed on 2/7/2025        [address] [place] [address]. [address]  [phone]

## 2025-02-13 ENCOUNTER — HOSPITAL ENCOUNTER (OUTPATIENT)
Dept: PHYSICAL THERAPY | Facility: HOSPITAL | Age: 75
Setting detail: THERAPIES SERIES
Discharge: HOME | End: 2025-02-13
Payer: MEDICARE

## 2025-02-13 ENCOUNTER — LAB (OUTPATIENT)
Dept: LAB | Facility: HOSPITAL | Age: 75
End: 2025-02-13
Payer: MEDICARE

## 2025-02-13 ENCOUNTER — OFFICE VISIT (OUTPATIENT)
Dept: INTERNAL MEDICINE | Facility: CLINIC | Age: 75
End: 2025-02-13
Payer: MEDICARE

## 2025-02-13 VITALS
TEMPERATURE: 98.1 F | OXYGEN SATURATION: 98 % | SYSTOLIC BLOOD PRESSURE: 117 MMHG | WEIGHT: 129 LBS | DIASTOLIC BLOOD PRESSURE: 75 MMHG | HEART RATE: 72 BPM | HEIGHT: 73 IN | BODY MASS INDEX: 17.1 KG/M2 | RESPIRATION RATE: 16 BRPM

## 2025-02-13 DIAGNOSIS — I89.0 LYMPHEDEMA: Primary | ICD-10-CM

## 2025-02-13 DIAGNOSIS — I48.91 NEW ONSET ATRIAL FIBRILLATION: ICD-10-CM

## 2025-02-13 DIAGNOSIS — E44.0 MODERATE PROTEIN-CALORIE MALNUTRITION: ICD-10-CM

## 2025-02-13 DIAGNOSIS — K12.33 MUCOSITIS DUE TO RADIATION THERAPY: ICD-10-CM

## 2025-02-13 DIAGNOSIS — E55.9 VITAMIN D DEFICIENCY: ICD-10-CM

## 2025-02-13 DIAGNOSIS — E03.9 ACQUIRED HYPOTHYROIDISM: ICD-10-CM

## 2025-02-13 DIAGNOSIS — Z43.1 ENCOUNTER FOR PEG (PERCUTANEOUS ENDOSCOPIC GASTROSTOMY): ICD-10-CM

## 2025-02-13 DIAGNOSIS — R13.12 OROPHARYNGEAL DYSPHAGIA: ICD-10-CM

## 2025-02-13 DIAGNOSIS — F33.1 MODERATE EPISODE OF RECURRENT MAJOR DEPRESSIVE DISORDER: ICD-10-CM

## 2025-02-13 DIAGNOSIS — E78.5 HYPERLIPIDEMIA, UNSPECIFIED HYPERLIPIDEMIA TYPE: Primary | ICD-10-CM

## 2025-02-13 DIAGNOSIS — G47.00 INSOMNIA, UNSPECIFIED TYPE: ICD-10-CM

## 2025-02-13 DIAGNOSIS — G31.84 MILD COGNITIVE IMPAIRMENT: ICD-10-CM

## 2025-02-13 DIAGNOSIS — Z85.819 HISTORY OF PHARYNGEAL CANCER: ICD-10-CM

## 2025-02-13 LAB
ALBUMIN SERPL-MCNC: 4 G/DL (ref 3.5–5.2)
ALBUMIN/GLOB SERPL: 1.3 G/DL
ALP SERPL-CCNC: 84 U/L (ref 39–117)
ALT SERPL W P-5'-P-CCNC: 12 U/L (ref 1–41)
ANION GAP SERPL CALCULATED.3IONS-SCNC: 9 MMOL/L (ref 5–15)
AST SERPL-CCNC: 21 U/L (ref 1–40)
BASOPHILS # BLD AUTO: 0.06 10*3/MM3 (ref 0–0.2)
BASOPHILS NFR BLD AUTO: 0.9 % (ref 0–1.5)
BILIRUB SERPL-MCNC: 0.6 MG/DL (ref 0–1.2)
BILIRUB UR QL STRIP: NEGATIVE
BUN SERPL-MCNC: 9 MG/DL (ref 8–23)
BUN/CREAT SERPL: 22 (ref 7–25)
CALCIUM SPEC-SCNC: 9.1 MG/DL (ref 8.6–10.5)
CHLORIDE SERPL-SCNC: 102 MMOL/L (ref 98–107)
CHOLEST SERPL-MCNC: 160 MG/DL (ref 0–200)
CLARITY UR: CLEAR
CO2 SERPL-SCNC: 29 MMOL/L (ref 22–29)
COLOR UR: YELLOW
CREAT SERPL-MCNC: 0.41 MG/DL (ref 0.76–1.27)
DEPRECATED RDW RBC AUTO: 51.7 FL (ref 37–54)
EGFRCR SERPLBLD CKD-EPI 2021: 112.2 ML/MIN/1.73
EOSINOPHIL # BLD AUTO: 0.27 10*3/MM3 (ref 0–0.4)
EOSINOPHIL NFR BLD AUTO: 4.2 % (ref 0.3–6.2)
ERYTHROCYTE [DISTWIDTH] IN BLOOD BY AUTOMATED COUNT: 13.5 % (ref 12.3–15.4)
GLOBULIN UR ELPH-MCNC: 3.1 GM/DL
GLUCOSE SERPL-MCNC: 90 MG/DL (ref 65–99)
GLUCOSE UR STRIP-MCNC: NEGATIVE MG/DL
HCT VFR BLD AUTO: 42.7 % (ref 37.5–51)
HDLC SERPL-MCNC: 69 MG/DL (ref 40–60)
HGB BLD-MCNC: 13.5 G/DL (ref 13–17.7)
HGB UR QL STRIP.AUTO: NEGATIVE
IMM GRANULOCYTES # BLD AUTO: 0.01 10*3/MM3 (ref 0–0.05)
IMM GRANULOCYTES NFR BLD AUTO: 0.2 % (ref 0–0.5)
KETONES UR QL STRIP: NEGATIVE
LDLC SERPL CALC-MCNC: 75 MG/DL (ref 0–100)
LDLC/HDLC SERPL: 1.07 {RATIO}
LEUKOCYTE ESTERASE UR QL STRIP.AUTO: NEGATIVE
LYMPHOCYTES # BLD AUTO: 0.94 10*3/MM3 (ref 0.7–3.1)
LYMPHOCYTES NFR BLD AUTO: 14.5 % (ref 19.6–45.3)
MCH RBC QN AUTO: 32.3 PG (ref 26.6–33)
MCHC RBC AUTO-ENTMCNC: 31.6 G/DL (ref 31.5–35.7)
MCV RBC AUTO: 102.2 FL (ref 79–97)
MONOCYTES # BLD AUTO: 0.84 10*3/MM3 (ref 0.1–0.9)
MONOCYTES NFR BLD AUTO: 13 % (ref 5–12)
NEUTROPHILS NFR BLD AUTO: 4.36 10*3/MM3 (ref 1.7–7)
NEUTROPHILS NFR BLD AUTO: 67.2 % (ref 42.7–76)
NITRITE UR QL STRIP: NEGATIVE
NRBC BLD AUTO-RTO: 0 /100 WBC (ref 0–0.2)
PH UR STRIP.AUTO: 6 [PH] (ref 5–8)
PLATELET # BLD AUTO: 178 10*3/MM3 (ref 140–450)
PMV BLD AUTO: 8.2 FL (ref 6–12)
POTASSIUM SERPL-SCNC: 3.9 MMOL/L (ref 3.5–5.2)
PROT SERPL-MCNC: 7.1 G/DL (ref 6–8.5)
PROT UR QL STRIP: NEGATIVE
RBC # BLD AUTO: 4.18 10*6/MM3 (ref 4.14–5.8)
SODIUM SERPL-SCNC: 140 MMOL/L (ref 136–145)
SP GR UR STRIP: 1.01 (ref 1–1.03)
TRIGL SERPL-MCNC: 87 MG/DL (ref 0–150)
TSH SERPL DL<=0.05 MIU/L-ACNC: 10.42 UIU/ML (ref 0.27–4.2)
UROBILINOGEN UR QL STRIP: NORMAL
VLDLC SERPL-MCNC: 16 MG/DL (ref 5–40)
WBC NRBC COR # BLD AUTO: 6.48 10*3/MM3 (ref 3.4–10.8)

## 2025-02-13 PROCEDURE — 85025 COMPLETE CBC W/AUTO DIFF WBC: CPT

## 2025-02-13 PROCEDURE — 36415 COLL VENOUS BLD VENIPUNCTURE: CPT

## 2025-02-13 PROCEDURE — 97110 THERAPEUTIC EXERCISES: CPT

## 2025-02-13 PROCEDURE — 82306 VITAMIN D 25 HYDROXY: CPT

## 2025-02-13 PROCEDURE — 81003 URINALYSIS AUTO W/O SCOPE: CPT

## 2025-02-13 PROCEDURE — 97140 MANUAL THERAPY 1/> REGIONS: CPT

## 2025-02-13 PROCEDURE — 80061 LIPID PANEL: CPT

## 2025-02-13 PROCEDURE — 84443 ASSAY THYROID STIM HORMONE: CPT

## 2025-02-13 PROCEDURE — 80053 COMPREHEN METABOLIC PANEL: CPT

## 2025-02-13 RX ORDER — DONEPEZIL HYDROCHLORIDE 5 MG/1
5 TABLET, FILM COATED ORAL NIGHTLY
Qty: 90 TABLET | Refills: 1 | Status: CANCELLED | OUTPATIENT
Start: 2025-02-13

## 2025-02-13 RX ORDER — NYSTATIN 100000 [USP'U]/ML
500000 SUSPENSION ORAL 4 TIMES DAILY
Qty: 473 ML | Refills: 1 | Status: SHIPPED | OUTPATIENT
Start: 2025-02-13

## 2025-02-13 RX ORDER — VENLAFAXINE HYDROCHLORIDE 150 MG/1
150 CAPSULE, EXTENDED RELEASE ORAL DAILY
Qty: 90 CAPSULE | Refills: 1 | Status: SHIPPED | OUTPATIENT
Start: 2025-02-13

## 2025-02-13 NOTE — THERAPY TREATMENT NOTE
Physical Therapy Treatment Note  [address] [place] [address]    Patient: Fortino Dennis                                                 Visit Date: 2025  :     1950    Referring practitioner:    Alli Card Jr*  Date of Initial Visit:          Type: THERAPY  Episode: lymphedema        Visit Diagnoses:    ICD-10-CM ICD-9-CM   1. Lymphedema  I89.0 457.1   2. History of pharyngeal cancer  Z85.819 V10.02       SUBJECTIVE     Subjective:  He has been working on the anterior neck, he doesn't feel like it is getting any softer.  He did like the upper body bike last treatment. He weighed 129 pounds today with clothes on.  His feeding tube is small and it's hard to get the liquid in so eats instead of using the tube.    PAIN: 0/10         OBJECTIVE     Objective    Lymphedema       Row Name 25 1430             Subjective Pain    Able to rate subjective pain? yes  -AL      Pre-Treatment Pain Level 0  -AL      Post-Treatment Pain Level 0  -AL         Manual Lymphatic Drainage    Manual Lymphatic Drainage initial sequence;head/neck treatment;opened regional lymph nodes  -AL      Initial Sequence full neck;abdomen;diaphragmatic breathing  -AL      Abdomen superficial  -AL      Diaphragmatic Breathing x 9 with abdomen  -AL      Opened Regional Lymph Nodes axillary  -AL      Axillary right;left  -AL      Head/Neck Treatment pre-auricular nodes;post-auricular nodes;occipital nodes;sublingual nodes;full/complex MLD  -AL      Manual Therapy 30  -AL                User Key  (r) = Recorded By, (t) = Taken By, (c) = Cosigned By      Initials Name Provider Type    AL Pastora Hamlin, PTA, CLT-JULIA Physical Therapist Assistant                    Therapeutic Exercises    72097 Units Comments   Stretched neck into rotation and sidebending.       Cervical extension X 5    Cervical retraction X 10    Alternating 1 minute forward level 1, 1  minute backwards level 1 for a total of 4 min  Rest break x 1        Timed Minutes 10          Therapy Education/Self Care 85819   Education offered today Order a size M neck compression garment. Work on MLD daily.  Work on stretching at home for the neck as well as using the hand-held massager over the anterior neck   [employer] Code    Ongoing HEP   Cont self massage and stretches   Timed Minutes        Total Timed Treatment:     40   mins  Total Time of Visit:             40   mins         ASSESSMENT/PLAN     GOALS  Goals                                          Progress Note due by 2/28/25                                                      Recert due by 4/28/2025   STG by: 4 weeks Comments Date Status   Patient will have a good basic understanding of lymphedema and its suggested risk reduction practices Continue to educate each treatment.  He has a good understanding. 10/23 Met/Ongoing   Patient will be independent with remedial HEP for lymphedema He will work on stretching more at home. He is able to tolerate the UBE at low levels 2/13/25 Partially met   Patient will be independent with self MLD for lymphedema He works on IASTM with the hand held massager, he will do follow up MLD. 10/23 Met             LTG by: 8 Weeks         Patient will have appropriate compression garments for lymphedema maintenance He has not ordered the compression garment 1/29/2025 Ongoing   Patient will have no sign or symptoms of infection  No open areas no weeping 9/4 Met   Patient will be independent with comprehensive maintenance program for lymphedema He needs to work more on his home maintenance program 1/29/2025 Ongoing                                         Assessment/Plan     ASSESSMENT:   Patient was able to work on the MLD as well as using the hand-held massager to the anterior neck more, the tissue in this area was much softer today.  The dense tissue he does have anterior neck softened quicker.  He did not have any  increased pain from the UBE, he was able to do this exercise again today in our gym but did need 1 rest break after 2 minutes.    PLAN:   Will see patient 1 x a week x 6 weeks for MLD.        SIGNATURE: Pastora Hamlin PTA, [employer]RI[employer]UMONT License #: [id number]  Electronically Signed on 2/13/2025        [address] [place] [address]. [address]  [phone]

## 2025-02-13 NOTE — PROGRESS NOTES
"Chief Complaint  Follow-up    Subjective    History of Present Illness      Patient presents to [facility] for   History of Present Illness     Patient is being seen today for a follow-up on his medication refill.  He states that his main concern today is that he feels like his PEG tube is not functioning as it should.  He states that when general surgery placed the new one a few months ago it was different from the other.  He has difficulty using this, meaning that he does not provide nutrition tube through this tube.  He states that he blends of his food the best he can and swallows it that way.        Objective   Vital Signs:   /75 (BP Location: Left arm, Patient Position: Sitting, Cuff Size: Other (Comment))   Pulse 72   Temp 98.1 °F (36.7 °C) (Temporal)   Resp 16   Ht 185.4 cm (73\")   Wt 58.5 kg (129 lb)   SpO2 98%   BMI 17.02 kg/m²     BMI is below normal parameters (malnutrition). Recommendations: is already seeing nutrition and therapy      Physical Exam  Vitals and nursing note reviewed.   Constitutional:       Appearance: Normal appearance. He is well-developed.      Comments: thin   HENT:      Head: Normocephalic and atraumatic.      Right Ear: Tympanic membrane, ear canal and external ear normal.      Left Ear: Tympanic membrane, ear canal and external ear normal.      Nose: Nose normal. No septal deviation, nasal tenderness or congestion.      Mouth/Throat:      Lips: Pink. No lesions.      Mouth: Mucous membranes are moist. No oral lesions.      Dentition: Normal dentition.      Pharynx: Oropharynx is clear. No pharyngeal swelling, oropharyngeal exudate or posterior oropharyngeal erythema.   Eyes:      General: Lids are normal. Vision grossly intact. No scleral icterus.        Right eye: No discharge.         Left eye: No discharge.      Extraocular Movements: Extraocular movements intact.      Conjunctiva/sclera: Conjunctivae normal.      Right eye: " Right conjunctiva is not injected.      Left eye: Left conjunctiva is not injected.      Pupils: Pupils are equal, round, and reactive to light.   Neck:      Thyroid: No thyroid mass.      Trachea: Trachea normal.   Cardiovascular:      Rate and Rhythm: Normal rate and regular rhythm.      Heart sounds: Normal heart sounds. No murmur heard.     No gallop.   Pulmonary:      Effort: Pulmonary effort is normal.      Breath sounds: Normal breath sounds and air entry. No wheezing, rhonchi or rales.   Abdominal:       Musculoskeletal:         General: No tenderness or deformity. Normal range of motion.      Cervical back: Full passive range of motion without pain, normal range of motion and neck supple.      Thoracic back: Normal.      Right lower leg: No edema.      Left lower leg: No edema.   Skin:     General: Skin is warm and dry.      Coloration: Skin is not jaundiced.      Findings: No rash.   Neurological:      Mental Status: He is alert and oriented to person, place, and time.      Sensory: Sensation is intact.      Motor: Motor function is intact.      Coordination: Coordination is intact.      Gait: Gait is intact.      Deep Tendon Reflexes: Reflexes are normal and symmetric.   Psychiatric:         Mood and Affect: Mood and affect normal.         Behavior: Behavior normal.         Judgment: Judgment normal.          TARI-7:      PHQ-2 Depression Screening    Little interest or pleasure in doing things? Not at all   Feeling down, depressed, or hopeless? Several days   PHQ-2 Total Score 1      PHQ-9 Depression Screening  Little interest or pleasure in doing things? Not at all   Feeling down, depressed, or hopeless? Several days   PHQ-2 Total Score 1   Trouble falling or staying asleep, or sleeping too much?     Feeling tired or having little energy?     Poor appetite or overeating?     Feeling bad about yourself - or that you are a failure or have let yourself or your family down?     Trouble concentrating on  things, such as reading the newspaper or watching television?     Moving or speaking so slowly that other people could have noticed? Or the opposite - being so fidgety or restless that you have been moving around a lot more than usual?     Thoughts that you would be better off dead, or of hurting yourself in some way?     PHQ-9 Total Score     If you checked off any problems, how difficult have these problems made it for you to do your work, take care of things at home, or get along with other people? Somewhat difficult           Result Review  Data Reviewed:                   Assessment and Plan      Diagnoses and all orders for this visit:    1. Hyperlipidemia, unspecified hyperlipidemia type (Primary)    2. Mild cognitive impairment    3. New onset atrial fibrillation  -     rivaroxaban (XARELTO) 20 MG tablet; Take 1 tablet by mouth Daily.  Dispense: 90 tablet; Refill: 1    4. Insomnia, unspecified type    5. Moderate episode of recurrent major depressive disorder  -     venlafaxine XR (Effexor XR) 150 MG 24 hr capsule; Take 1 capsule by mouth Daily.  Dispense: 90 capsule; Refill: 1    6. 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  Dispense: 473 mL; Refill: 1  -     Ambulatory Referral to Case Management Care Coordination, Caregiving/Support, Barriers to Care    7. Moderate protein-calorie malnutrition  -     CBC Auto Differential; Future  -     Comprehensive Metabolic Panel; Future  -     Lipid Panel; Future  -     Vitamin D,25-Hydroxy; Future  -     Urinalysis With Culture If Indicated - Urine, Clean Catch; Future  -     Ambulatory Referral to Case Management Care Coordination, Caregiving/Support, Barriers to Care    8. Acquired hypothyroidism  -     TSH; Future    9. Vitamin D deficiency  -     Vitamin D,25-Hydroxy; Future    10. Oropharyngeal  dysphagia  Overview:  Added automatically from request for surgery [id number]    Orders:  -     Ambulatory Referral to Case Management Care Coordination, Caregiving/Support, Barriers to Care  -     Ambulatory Referral to General Surgery    11. Encounter for PEG (percutaneous endoscopic gastrostomy)  -     Ambulatory Referral to General Surgery    Patient is being seen today for a follow-up on his medication refill.  He states that his main concern today is that he feels like his PEG tube is not functioning as it should.  He states that when general surgery placed the new one a few months ago it was different from the other.  He has difficulty using this, meaning that he does not provide nutrition tube through this tube.  He states that he blends of his food the best he can and swallows it that way.  Patient states that he eats a lot of lended beans vegetables and cereal.  Plan  1.  Rerefer to general surgery for evaluation  2.  Case management referral for help with care  3.  Refill chronic medications today        Follow Up   Return in about 3 months (around 5/13/2025) for Recheck.  Patient was given instructions and counseling regarding his condition or for health maintenance advice. Please see specific information pulled into the AVS if appropriate.

## 2025-02-14 ENCOUNTER — TELEPHONE (OUTPATIENT)
Dept: SURGERY | Facility: CLINIC | Age: 75
End: 2025-02-14
Payer: MEDICARE

## 2025-02-14 ENCOUNTER — REFERRAL TRIAGE (OUTPATIENT)
Dept: CASE MANAGEMENT | Facility: OTHER | Age: 75
End: 2025-02-14
Payer: MEDICARE

## 2025-02-14 LAB — 25(OH)D3 SERPL-MCNC: 33.2 NG/ML (ref 30–100)

## 2025-02-14 NOTE — TELEPHONE ENCOUNTER
Attempted to contact pt to schedule a referral appt we received. I left pt a voicemail with our office phone number to call back and schedule.     ECC  2/14/25

## 2025-02-17 ENCOUNTER — TELEPHONE (OUTPATIENT)
Dept: SURGERY | Facility: CLINIC | Age: 75
End: 2025-02-17
Payer: MEDICARE

## 2025-02-17 NOTE — TELEPHONE ENCOUNTER
Attempted to contact pt to schedule a referral appt we received. I left pt a voicemail with our office phone number to call back and schedule.     ECC   2/17/25

## 2025-02-18 ENCOUNTER — TELEPHONE (OUTPATIENT)
Dept: SURGERY | Facility: CLINIC | Age: 75
End: 2025-02-18
Payer: MEDICARE

## 2025-02-18 ENCOUNTER — APPOINTMENT (OUTPATIENT)
Dept: PHYSICAL THERAPY | Facility: HOSPITAL | Age: 75
End: 2025-02-18
Payer: MEDICARE

## 2025-02-18 NOTE — TELEPHONE ENCOUNTER
Attempted to contact PT regarding scheduling for their referral. I left them a voicemail with our office number and requested they call us back at their earliest convenience to get scheduled.    Spoke with the Pts brother Geovanny regarding this referral and he stated that the pt doesn't answer the phone most of the time, but would let him know to give us a call to get the appointment scheduled.     ECC  2/18/25

## 2025-02-20 ENCOUNTER — TELEPHONE (OUTPATIENT)
Dept: CASE MANAGEMENT | Facility: OTHER | Age: 75
End: 2025-02-20
Payer: MEDICARE

## 2025-02-20 DIAGNOSIS — E03.9 ACQUIRED HYPOTHYROIDISM: Primary | ICD-10-CM

## 2025-02-20 RX ORDER — LEVOTHYROXINE SODIUM 75 UG/1
75 TABLET ORAL
Qty: 90 TABLET | Refills: 1 | Status: SHIPPED | OUTPATIENT
Start: 2025-02-20

## 2025-02-20 NOTE — PROGRESS NOTES
Cmp stable  Cholesterol is great  TSH is Hypo- will increase synthroid slightly. Make sure to take 1st thing am on an empty stomach. Rechek tsh in 3 months  Cbc stable  Vitamin d is low normal- take 5000 units otc supplement daily for bone health

## 2025-02-24 ENCOUNTER — TELEPHONE (OUTPATIENT)
Dept: CASE MANAGEMENT | Facility: OTHER | Age: 75
End: 2025-02-24
Payer: MEDICARE

## 2025-02-27 ENCOUNTER — HOSPITAL ENCOUNTER (OUTPATIENT)
Dept: PHYSICAL THERAPY | Facility: HOSPITAL | Age: 75
Setting detail: THERAPIES SERIES
Discharge: HOME | End: 2025-02-27
Payer: MEDICARE

## 2025-02-27 DIAGNOSIS — I89.0 LYMPHEDEMA: Primary | ICD-10-CM

## 2025-02-27 DIAGNOSIS — Z85.819 HISTORY OF PHARYNGEAL CANCER: ICD-10-CM

## 2025-02-27 PROCEDURE — 97110 THERAPEUTIC EXERCISES: CPT

## 2025-02-27 PROCEDURE — 97140 MANUAL THERAPY 1/> REGIONS: CPT

## 2025-02-27 NOTE — THERAPY TREATMENT NOTE
Physical Therapy Treatment Note and 30 Day Progress Note  [address] [place] [address]    Patient: Fortino Dennis                                                 Visit Date: 2025  :     1950    Referring practitioner:    Alli Card Jr*  Date of Initial Visit:          Type: THERAPY  Episode: lymphedema        Visit Diagnoses:    ICD-10-CM ICD-9-CM   1. Lymphedema  I89.0 457.1   2. History of pharyngeal cancer  Z85.819 V10.02       SUBJECTIVE     Subjective:  He states he has not gained any weight, he has not ordered the compression garment.  He is working on the HEP and MLD.     PAIN: 0/10         OBJECTIVE     Objective    Lymphedema       Row Name 25 1235             Subjective Pain    Able to rate subjective pain? yes  -AL      Pre-Treatment Pain Level 0  -AL      Post-Treatment Pain Level 0  -AL         Lymphedema Measurements    Measurement Type(s) Circumferential  -AL      Circumferential Areas Neck  -AL         Head Circumferential (cm)    Measurement Location 1 Upper lip  -AL      Head 1 45.4 cm  -AL      Measurement Location 2 Lower lip  -AL      Head 2 44 cm  -AL      Head Circumferential Total 89.4 cm  -AL         Neck Circumferential (cm)    Measurement Location 1 Sarabjit's apple  -AL      Neck 1 32.6 cm  -AL      Neck Circumferential Total 32.6 cm  -AL         Manual Lymphatic Drainage    Manual Lymphatic Drainage initial sequence;head/neck treatment;opened regional lymph nodes  -AL      Initial Sequence full neck;abdomen;diaphragmatic breathing  -AL      Abdomen superficial  -AL      Diaphragmatic Breathing x 9 with abdomen  -AL      Opened Regional Lymph Nodes axillary  -AL      Axillary right;left  -AL      Head/Neck Treatment pre-auricular nodes;post-auricular nodes;occipital nodes;sublingual nodes;full/complex MLD  -AL      Manual Lymphatic Drainage Comments Used the hand held massager over the  anterior neck including the scar tissue.  -AL      Manual Therapy 45  -AL         Compression/Skin Care    Compression/Skin Care Comments Order compression  -AL                User Key  (r) = Recorded By, (t) = Taken By, (c) = Cosigned By      Initials Name Provider Type    Pastora Serrano, MARIPOSA, CLT-JULIA Physical Therapist Assistant                      Therapeutic Exercises    01382 Units Comments   Stretched neck into rotation and sidebending.       Cervical extension X 5    Alternating forward and backwards for a total of 3 minutes and 30 seconds  Rest break x 1        Timed Minutes 10          Therapy Education/Self Care 00881   Education offered today Order a size M neck compression garment and upper body bike.   Medbridge Code    Ongoing HEP   Cont self massage and stretches as well as MLD   Timed Minutes        Total Timed Treatment:     55   mins  Total Time of Visit:             55   mins         ASSESSMENT/PLAN     GOALS  Goals                                          Progress Note due by 3/29/25                                                      Recert due by 4/28/2025   STG by: 4 weeks Comments Date Status   Patient will have a good basic understanding of lymphedema and its suggested risk reduction practices Continue to educate each treatment.  He has a good understanding. 10/23 Met/Ongoing   Patient will be independent with remedial HEP for lymphedema He is working on stretching at [facility]. 2/27/25 Partially met   Patient will be independent with self MLD for lymphedema He works on IASTM with the hand held massager, he will do follow up MLD. 10/23 Met             LTG by: 8 Weeks         Patient will have appropriate compression garments for lymphedema maintenance He has not ordered the compression garment, he is planning on doing this. 2/27/25 Ongoing   Patient will have no sign or symptoms of infection  No open areas no weeping /9/4 Met   Patient will be independent with comprehensive maintenance  program for lymphedema Patient is working more on the HEP and MLD. /2/27/25 Ongoing                                         Assessment/Plan     ASSESSMENT: Patient has had a reduction of 1.4 cm at the Navarrete apple as compared to his last measurements.  His neck continues to be tight with rotation and sidebending.  Patient fatigued quicker today with the upper body bike, he will look into purchasing an upper body bike that he can use at home.  Stressed importance of having a compression garment, he is planning on purchasing garment tomorrow.  Once patient has a garment we will use either a foam chip bag or a cherry pit pack over the anterior neck to help soften the tissue.    PLAN:   Will see patient 1 x a week x 6 weeks for MLD.        SIGNATURE: Pastora Hamlin PTA, LANG-JUL[place], [place] License #: [id number]  Electronically Signed on 2/27/2025        [address] [place] [address]. [address]  [phone]

## 2025-02-27 NOTE — THERAPY PROGRESS REPORT/RE-CERT
Progress Note Addendum    Patient: Fortino Dennis           : 1950  Visit Date: 2025  Referring practitioner: Alli Card Jr*  Date of Initial Visit: Type: THERAPY  Episode: lymphedema    Visit Diagnoses:    ICD-10-CM ICD-9-CM   1. Lymphedema  I89.0 457.1   2. History of pharyngeal cancer  Z85.819 V10.02          Clinical Progress: improved  Home Program Compliance: Yes  Progress toward previous goals: Partially Met  Prognosis to achieve goals: good    Objective   See PTA note  Assessment/Plan    PLAN:   Assessment  Impairments: abnormal or restricted ROM and lacks appropriate home exercise program   Assessment details: Mr. Dennis is having issues with lymphedema of the head and neck due to previous treatments for cancer. He has restricted cervical mobility and swelling in the neck and face, worse on the L due to mandible reconstruction. He would benefit from a course of CDT to try to improve this condition.   Prognosis: good     Plan  Therapy options: will be seen for skilled therapy services  Planned therapy interventions: manual therapy, compression, therapeutic activities and home exercise program      Will see patient 1 x a week x 6 weeks for MLD.         I reviewed the treatment and goals with Pastora Hamlin PTA, CHIKA and agree with the POC.    SIGNATURE: Laurel Ortega PT DPT, License #: [id number]  Electronically Signed on 2025

## 2025-02-28 ENCOUNTER — TELEPHONE (OUTPATIENT)
Dept: CASE MANAGEMENT | Facility: OTHER | Age: 75
End: 2025-02-28
Payer: MEDICARE

## 2025-03-03 ENCOUNTER — HOSPITAL ENCOUNTER (OUTPATIENT)
Dept: PHYSICAL THERAPY | Facility: HOSPITAL | Age: 75
Setting detail: THERAPIES SERIES
Discharge: HOME | End: 2025-03-03
Payer: MEDICARE

## 2025-03-03 DIAGNOSIS — Z85.819 HISTORY OF PHARYNGEAL CANCER: ICD-10-CM

## 2025-03-03 DIAGNOSIS — I89.0 LYMPHEDEMA: Primary | ICD-10-CM

## 2025-03-03 PROCEDURE — 97110 THERAPEUTIC EXERCISES: CPT

## 2025-03-03 PROCEDURE — 97140 MANUAL THERAPY 1/> REGIONS: CPT

## 2025-03-03 NOTE — THERAPY TREATMENT NOTE
Physical Therapy Treatment Note  [address] [place] [address]    Patient: Fortino Dennis                                                 Visit Date: 3/3/2025  :     1950    Referring practitioner:    Alli Card Jr*  Date of Initial Visit:          Type: THERAPY  Episode: lymphedema        Visit Diagnoses:    ICD-10-CM ICD-9-CM   1. Lymphedema  I89.0 457.1   2. History of pharyngeal cancer  Z85.819 V10.02       SUBJECTIVE     Subjective:  He has not ordered the compression garment or the upper body bike.     PAIN: 0/10         OBJECTIVE     Objective    Lymphedema       Row Name 25 1100             Subjective Pain    Able to rate subjective pain? yes  -AL      Pre-Treatment Pain Level 0  -AL      Post-Treatment Pain Level 0  -AL         Manual Lymphatic Drainage    Manual Lymphatic Drainage initial sequence;head/neck treatment;opened regional lymph nodes  -AL      Initial Sequence full neck;abdomen;diaphragmatic breathing  -AL      Abdomen superficial  -AL      Diaphragmatic Breathing x 9 with abdomen  -AL      Opened Regional Lymph Nodes axillary  -AL      Axillary right;left  -AL      Head/Neck Treatment pre-auricular nodes;post-auricular nodes;occipital nodes;sublingual nodes;full/complex MLD  -AL      Manual Lymphatic Drainage Comments Used the hand held massager over the anterior neck including the scar tissue.  -AL      Manual Therapy 35  -AL         Compression/Skin Care    Compression/Skin Care Comments Order compression garment  -AL                User Key  (r) = Recorded By, (t) = Taken By, (c) = Cosigned By      Initials Name Provider Type    AL Pastora Hamlin PTA, CLT-JULIA Physical Therapist Assistant                        Therapeutic Exercises    13211 Units Comments   Stretched neck into rotation and sidebending.       Cervical extension X 5    Alternating forward and backwards for a total of 4 minutes    No rest breaks        Timed Minutes 10          Therapy Education/Self Care 30421   Education offered today Order a size M neck compression garment and upper body bike.   Medbridge Code    Ongoing HEP   Cont self massage and stretches as well as MLD   Timed Minutes        Total Timed Treatment:     45   mins  Total Time of Visit:             45   mins         ASSESSMENT/PLAN     GOALS  Goals                                          Progress Note due by 3/29/25                                                      Recert due by 4/28/2025   STG by: 4 weeks Comments Date Status   Patient will have a good basic understanding of lymphedema and its suggested risk reduction practices Continue to educate each treatment.  He has a good understanding. 10/23 Met/Ongoing   Patient will be independent with remedial HEP for lymphedema He is working on stretching at [facility]. 2/27/25 Partially met   Patient will be independent with self MLD for lymphedema He works on IASTM with the hand held massager, he will do follow up MLD. 10/23 Met             LTG by: 8 Weeks         Patient will have appropriate compression garments for lymphedema maintenance He has not ordered the compression garment, he is planning on doing this. 3/3/25 Ongoing   Patient will have no sign or symptoms of infection  No open areas no weeping /9/4 Met   Patient will be independent with comprehensive maintenance program for lymphedema Patient is working more on the HEP and MLD. /2/27/25 Ongoing                                         Assessment/Plan     ASSESSMENT: Patient has not ordered the compression garment or the upper body bike yet, he plans to do so soon.  The interior neck tissue is less dense today before treatment, and softens more after treatment.  Patient was able to use the upper body bike for a total of 4 minutes at level 1 alternating forward and backwards each minute.  PLAN:   Will see patient 1 x a week x 6 weeks for MLD.        SIGNATURE: Pastora  ANEESH Hamlin PTA, T-JULIA, [place] License #: [id number]  Electronically Signed on 3/3/2025        [address] [place] [address]  [phone]

## 2025-03-11 ENCOUNTER — HOSPITAL ENCOUNTER (OUTPATIENT)
Dept: PHYSICAL THERAPY | Facility: HOSPITAL | Age: 75
Setting detail: THERAPIES SERIES
Discharge: HOME | End: 2025-03-11
Payer: MEDICARE

## 2025-03-11 DIAGNOSIS — I89.0 LYMPHEDEMA: Primary | ICD-10-CM

## 2025-03-11 DIAGNOSIS — Z85.819 HISTORY OF PHARYNGEAL CANCER: ICD-10-CM

## 2025-03-11 PROCEDURE — 97110 THERAPEUTIC EXERCISES: CPT | Performed by: PHYSICAL THERAPIST

## 2025-03-11 PROCEDURE — 97140 MANUAL THERAPY 1/> REGIONS: CPT | Performed by: PHYSICAL THERAPIST

## 2025-03-11 NOTE — THERAPY TREATMENT NOTE
Physical Therapy Treatment Note  [address] [place] [address]    Patient: Fortino Dennis                                                 Visit Date: 3/11/2025  :     1950    Referring practitioner:    Alli Card Jr*  Date of Initial Visit:          Type: THERAPY  Episode: lymphedema        Visit Diagnoses:    ICD-10-CM ICD-9-CM   1. Lymphedema  I89.0 457.1   2. History of pharyngeal cancer  Z85.819 V10.02       SUBJECTIVE     Subjective:  He is always cold because he has lost so much weight.    PAIN: 0/10         OBJECTIVE     Objective    Lymphedema       Row Name 25 1100             Subjective Pain    Able to rate subjective pain? yes  -HR      Pre-Treatment Pain Level 0  -HR         Manual Lymphatic Drainage    Manual Lymphatic Drainage initial sequence;head/neck treatment;opened regional lymph nodes  -HR      Initial Sequence full neck;abdomen;diaphragmatic breathing  -HR      Abdomen superficial  -HR      Opened Regional Lymph Nodes axillary  -HR      Axillary right;left  -HR      Head/Neck Treatment pre-auricular nodes;post-auricular nodes;occipital nodes;sublingual nodes;full/complex MLD  -HR      Manual Lymphatic Drainage Comments Used the hand held massager over the anterior neck including the scar tissue.  -HR      Manual Therapy 35  -HR                User Key  (r) = Recorded By, (t) = Taken By, (c) = Cosigned By      Initials Name Provider Type    HR Ellen Rosenbaum, PT, DPT, CLT-JULIA Physical Therapist                        Therapeutic Exercises    85430 Units Comments   Stretched neck into rotation and sidebending.       Cervical extension X 5              Timed Minutes 10          Therapy Education/Self Care 86374   Education offered today Order a size M neck compression garment and upper body bike.   Medbridge Code    Ongoing HEP   Cont self massage and stretches as well as MLD   Timed Minutes         Total Timed Treatment:     45   mins  Total Time of Visit:             45   mins         ASSESSMENT/PLAN     GOALS  Goals                                          Progress Note due by 3/29/25                                                      Recert due by 4/28/2025   STG by: 4 weeks Comments Date Status   Patient will have a good basic understanding of lymphedema and its suggested risk reduction practices Continue to educate each treatment.  He has a good understanding. 10/23 Met/Ongoing   Patient will be independent with remedial HEP for lymphedema He is working on stretching at [facility]. 2/27/25 Partially met   Patient will be independent with self MLD for lymphedema He works on IASTM with the hand held massager, he will do follow up MLD. 10/23 Met             LTG by: 8 Weeks         Patient will have appropriate compression garments for lymphedema maintenance He has not ordered the compression garment, he is planning on doing this. 3/11/25 Ongoing   Patient will have no sign or symptoms of infection  No open areas no weeping /9/4 Met   Patient will be independent with comprehensive maintenance program for lymphedema Patient is working more on the HEP and MLD.  He uses a hand-held massager for his neck as well 3/11/25 Ongoing                                         Assessment/Plan     ASSESSMENT: Patient has not ordered the compression garment or the upper body bike yet.  He has very little soft tissue in his neck and fibrotic tendons and bones are all very visible.  I asked about his weight loss and he said he had not checked in a while.  He only eats sparingly because it is difficult.  I cannot diagnose it but I feel that depression is preventing him from doing the things that could make him feel better.      PLAN:   Will see patient 1 x a week x 6 weeks for MLD.        SIGNATURE: Ellen Rosenbaum, PT, DPT, [place]RIAN [place] License #: [id number]  Electronically Signed on 3/11/2025        [address] [address]  [phone]

## 2025-03-24 ENCOUNTER — OFFICE VISIT (OUTPATIENT)
Dept: OTOLARYNGOLOGY | Facility: CLINIC | Age: 75
End: 2025-03-24
Payer: MEDICARE

## 2025-03-24 VITALS
BODY MASS INDEX: 17.65 KG/M2 | SYSTOLIC BLOOD PRESSURE: 122 MMHG | DIASTOLIC BLOOD PRESSURE: 85 MMHG | TEMPERATURE: 98.7 F | WEIGHT: 133.2 LBS | HEART RATE: 55 BPM | HEIGHT: 73 IN

## 2025-03-24 DIAGNOSIS — R47.1 DYSARTHRIA: ICD-10-CM

## 2025-03-24 DIAGNOSIS — R13.12 OROPHARYNGEAL DYSPHAGIA: ICD-10-CM

## 2025-03-24 DIAGNOSIS — K02.9 CARIES INVOLVING MULTIPLE SURFACES OF TOOTH: ICD-10-CM

## 2025-03-24 DIAGNOSIS — I89.0 LYMPHEDEMA: ICD-10-CM

## 2025-03-24 DIAGNOSIS — E03.9 ACQUIRED HYPOTHYROIDISM: ICD-10-CM

## 2025-03-24 DIAGNOSIS — R25.2 TRISMUS: ICD-10-CM

## 2025-03-24 DIAGNOSIS — E44.0 MODERATE PROTEIN-CALORIE MALNUTRITION: ICD-10-CM

## 2025-03-24 DIAGNOSIS — K12.33 MUCOSITIS DUE TO RADIATION THERAPY: ICD-10-CM

## 2025-03-24 DIAGNOSIS — K22.2 STENOSIS OF ESOPHAGUS: ICD-10-CM

## 2025-03-24 DIAGNOSIS — R13.11 ORAL PHASE DYSPHAGIA: Primary | ICD-10-CM

## 2025-03-24 DIAGNOSIS — Z85.819 HISTORY OF PHARYNGEAL CANCER: ICD-10-CM

## 2025-03-24 PROCEDURE — 3074F SYST BP LT 130 MM HG: CPT | Performed by: OTOLARYNGOLOGY

## 2025-03-24 PROCEDURE — 1159F MED LIST DOCD IN RCRD: CPT | Performed by: OTOLARYNGOLOGY

## 2025-03-24 PROCEDURE — 3079F DIAST BP 80-89 MM HG: CPT | Performed by: OTOLARYNGOLOGY

## 2025-03-24 PROCEDURE — G2211 COMPLEX E/M VISIT ADD ON: HCPCS | Performed by: OTOLARYNGOLOGY

## 2025-03-24 PROCEDURE — 1160F RVW MEDS BY RX/DR IN RCRD: CPT | Performed by: OTOLARYNGOLOGY

## 2025-03-24 PROCEDURE — 99213 OFFICE O/P EST LOW 20 MIN: CPT | Performed by: OTOLARYNGOLOGY

## 2025-03-24 NOTE — PATIENT INSTRUCTIONS
Use feeding tube 1 can 2 times daily in between meals    Jaw and neck stretching    ORAL COMPLICATIONS OF CANCER TREATMENT    Cancer treatments can have a toxic effect on the mucous membranes of the mouth and throat tissues. Almost all patients who receive radiation therapy for head and neck malignancies will develop problems with their gums, mouth, tongue, or teeth. Chemotherapy and bone marrow transplants can also affect these areas.    Many of the problems can be prevented or minimized by careful and diligent attention. Patients should seek dental care prior to beginning the cancer treatments. Taking care of existing problems before therapy can prevent major problems later, including tooth and bone infection, tooth and bone loss, and pain.    Dr. Card works closely with the medical and radiation oncologists, as well as the dentist and oral care providers to ensure the optimal heal of the head and neck tissues during cancer treatments. However, the patient needs to maintain a routine to care for the teeth and mucus membranes before and especially during and after treatment.    General Oral Complications of Cancer Treatment  Inflammation and ulceration of the mucous membranes, called stomatitis or mucositis  Infection  Salivary gland dysfunction, called xerostomia  Tooth decay and demineralization  Impaired function; difficulty eating, swallowing, speaking  Chronic throat pain/ scratchiness  Altered taste perception  Poor nutrition    Additional complications of Chemotherapy  Nerve pain  Bleeding  Skin damage  Prolonged/delayed healing    Additional complications of Radiation therapy  Rampant dental decay  Skin damage  Jaw or neck stiffness  Increased susceptibility to injury and infection  Prolonged/delayed healing    HOW TO CARE FOR YOUR ORAL HEALTH DURING CANCER THERAPY  See a dentist prior to beginning radiation therapy  Brush your teeth and tongue gently with an extra soft, even bristled toothbrush and a  bland toothpaste after every meal and at bedtime  Floss teeth once a day but avoid areas that are bleeding or sore  Don’t use mouthwash that contains alcohol  Rinse mouth with baking soda/salt combination or an antiplaque solution at room temperature several times daily. You may also use plain water.  Use a waterpik aimed at the teeth, not the gums with a warm baking soda and salt mixture.  If you wear dentures, wear them only when necessary and never at night.  Increase water intake and use oral moisturizing gels.  Use fluoride if recommended by the dentist.  Exercise jaw muscles 3 times daily and more. Do 20 repetitions of opening and closing the mouth, gently stretching and holding open. If the jaw muscles are particularly stiff, you may insert your fingers and gently pull the jaws apart.  Avoid rough-textured or irritating foods. Drink sips of liquids with each bite to allow the food to soften or thin the food.  Use Chapstick, Blistex, or other soothing lip balms, including Vaseline or Polysporin to the lips.  If the lips begin to crust, do not pull this off is there is resistance. Instead, soak the lips with a warm wash cloth to gently loosen these crusts.  Talk with Dr. Card about pain or numbing medications, both topical and systemic.  Quit smoking, chewing, snuff. All of these tobacco products accelerate the decay process and increase cancer risks.    For a dry mouth:  Sip water frequently  Use a humidifier  Suck ice chips or sugar free candy  Chew sugar free gum  If desired, use saliva substitute or gel, or prescription saliva stimulant  Avoid lemon glycerin swabs and sugar candies and lozenges    Recipe for oral rinse:  Salt  1 tablespoon  Baking Soda 1 teaspoon  Water  1 quart  Hannah syrup 1 tablespoon    Optional:  Small amount of mouthwash for flavoring  If you have scabs or extremely thick mucous, you may mix the above 50:50 with hydrogen peroxide to help loosen this thick layer.    Dietary  Instructions:  Choose soft, moist foods  Moisten foods with gravy, broth or butter  Increase fluids with meals  Keep food bite-sized  Blenderize foods or use baby foods  Avoid:  Very hot or very cold beverages  Scratchy or rough foods like pretzels, chips, crackers or nuts  Spicy or salty foods like canned soups. vinegar, ketchup, salsa  Alcohol, beer, wine, whiskey, etc.  Strongly minted candies and toothpaste  Fumes like ammonia, household , paints, gasoline, solvents    Skin care with Cancer treatment:  Moisturize the area of treatment at least 4 times daily and more often  Use hypoallergenic moisturizers  Ask Dr. Card about certain healing creams and lotions  Avoid strong soaps and excessive makeup  Avoid the sun  If sun exposure is anticipated, use high SPF sunblocks and Zinc Oxide  Do not use razor blades  Avoid any trauma to the area, including squeezing pimples and popping blisters  Report any sores or skin breakdown to Dr. Card    Medications:  Continue all your daily medications  If you are experiencing side effects, report these to Dr. Card  Continue your multivitamins  Certain medications may interfere with radiation. Please check with Dr. Card  Certain natural and homeopathic products can interfere and lessen the efficacy of radiation, especially anti-oxidants. Please ask Dr. Card about these products.    Please do not hesitate to call the office for questions or problems.      CONTACT INFORMATION:  The main office phone number is [phone]. For emergencies after hours and on weekends, this number will convert over to our answering service and the on call provider will answer. Please try to keep non emergent phone calls/ questions to office hours 9am-5pm Monday through Friday.     [employer]  As an alternative, you can sign up and use the Epic MyChart system for more direct and quicker access for non emergent questions/ problems.  [facility] [employer] allows you to send  messages to your doctor, view your test results, renew your prescriptions, schedule appointments, and more. To sign up, go to [email or web address] and click on the Sign Up Now link in the New User? box. Enter your [employer] Activation Code exactly as it appears below along with the last four digits of your Social Security Number and your Date of Birth () to complete the sign-up process. If you do not sign up before the expiration date, you must request a new code.    [employer] Activation Code: Activation code not generated  Current [employer] Status: Active    If you have questions, you can email [email or web address] or call [phone] to talk to our [employer] staff. Remember, [employer] is NOT to be used for urgent needs. For medical emergencies, dial 911.

## 2025-03-24 NOTE — ASSESSMENT & PLAN NOTE
Patient's Body mass index is 17.57 kg/m².  BMI indicates moderate protein-calorie malnutrition with health conditions related to an underlying condition that include Oral cancer . Weight issue is unchanged. BMI is Information on healthy weight added to patient's after visit summary.  BMI management for patient includes the following: none (medical contraindication).

## 2025-03-24 NOTE — PROGRESS NOTES
Alli Card Jr, MD  [facility] EAR NOSE & THROAT  [address], [address]  [place] [address]  Fax [phone]  Phone [phone]      Visit Type: FOLLOW UP   Chief Complaint   Patient presents with    Recheck swallowing           HISTORY OBTAINED FROM: patient  Accompanied by:No one  HPI  He presents for a follow up evaluation. He says weight stable. Not really using G tube at all. He wishes to only eat orally.     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    Sleep apnea     Throat cancer     Thyroid disorder     Tinnitus 2020    TMJ dysfunction 2020 wear        Past Surgical History:   Procedure Laterality Date    CATARACT EXTRACTION WITH INTRAOCULAR LENS IMPLANT Bilateral     pt states x3 surgeries    ENDOSCOPY N/A 9/19/2024    Procedure: ESOPHAGOGASTRODUODENOSCOPY WITH PERCUTANEOUS ENDOSCOPIC GASTROSTOMY TUBE INSERTION WITH ANESTHESIA;  Surgeon: Anny Pearce MD;  Location: [facility] ENDOSCOPY;  Service: General;  Laterality: N/A;  Pre: Peg tube insertion;  Post: Peg tube inserted;  Fortino Sierra, DO    GTUBE INSERTION      LARYNGOSCOPY Bilateral 07/07/2023    Procedure: MICRODIRECT LARYNGOSCOPY;  Surgeon: Alli Card Jr., MD;  Location: [facility] OR;  Service: ENT;  Laterality: Bilateral;    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     MANDIBLE SURGERY  2021    removal of necrotic jaw (part of 1 of 2 surgeries)    MASTECTOMY Left     removal of breast due to benign growth    PANENDOSCOPY Bilateral 07/07/2023    Procedure: DIRECT LARYNGOSCOPY, ESOPHAGOSCOPY, BRONCHOSCOPY, MICRODIRECT LARYNGOSCOPY,OR ESOPHAGEAL DILATATION 38-48;  Surgeon: Alli Card Jr., MD;  Location: [facility];  Service: ENT;  Laterality: Bilateral;    THROAT SURGERY  2007    cancerous mass removed       Family History: His family history includes Cancer in his mother; Colon cancer in his mother; Dementia in his mother; Hypertension in his mother.     Social History: He  reports that he has never smoked. He has never been exposed to tobacco smoke. He has never used smokeless tobacco. He reports current alcohol use of about 3.0 standard drinks of alcohol per week. He reports that he does not use drugs.    Home Medications:  Senna, acetaminophen, cycloSPORINE, donepezil, levothyroxine, nystatin, rivaroxaban, venlafaxine XR, and zolpidem    Allergies:  He has no known allergies.       Vital Signs:   Temp:  [98.7 °F (37.1 °C)] 98.7 °F (37.1 °C)  Heart Rate:  [55] 55  BP: (122)/(85) 122/85  ENT Physical Exam  Constitutional  Appearance: patient appears well-developed, cachectic, patient is cooperative;  Communication/Voice: communication appropriate for developmental age; speech dysarthric; Communication comments: NPescape and dysarthria  Constitutional comments: Frail  Using cane  Head and Face  Appearance: head appears normal, face appears normal and face appears atraumatic;  Palpation: facial palpation normal;  Salivary: glands normal;  Ear  Hearing: intact;  Auricles: bilateral auricles normal;  External Mastoids: right external mastoid normal; left external mastoid normal;  Ear Canals: bilateral ear canals normal;  Tympanic Membranes: bilateral tympanic membranes normal;  Nose  External Nose: nares patent bilaterally; external nose normal;  Internal Nose: nasal  mucosa normal; Nasal mucosa comments: R side mid synecia, very thin   nasal septal deviation present; deviation is to the left, septal deviation is severe; Septum comments: R to L mid severe   bilateral inferior turbinates edematous and erythematous;  Oral Cavity/Oropharynx  Lips: normal;  Teeth: missing teeth (L mandible with impnat) noted; dental caries present;  Gums: gingival recession and edema present; Gum comments: gingivits  Tongue: surface is coated and smooth; Oral Tongue comments: very dry  Oral mucosa: with mucosal erythema present; buccal mucosa Buccal Mucosa comments: thickened secretions, Left inf sullcus w food collecting  Hard palate: normal;  Soft palate: normal;  Tonsils: bilateral tonsils 1+, Tonsil comments: dry and coated  OC/OP comments: Trismus-opens to approximately 18 mm at the incisors  Neck  Neck: scars (Left neck dissection scar, well-healed) present; neck asymmetric (Status post left neck dissection); no neck mass present;  Thyroid: no thyroid mass present;  Neck comments: Left neck, well-healed  Very atrophic musculature of the right neck  Brawny induration left greater than right  Poor laryngeal elevation  Banding of neck muscles  Respiratory  Inspection: breathing unlabored; normal breathing rate;  Cardiovascular  Inspection: extremities are warm and well perfused; no peripheral edema present;  Lymphatic  Palpation: no cervical adenopathy noted;  Neurovestibular  Mental Status: alert and oriented;  Psychiatric: mood normal; affect is appropriate;  Drawings                Result Review       RESULTS REVIEW    I have reviewed the patients old records in the chart.   I have reviewed the patients old records in the chart.        Assessment & Plan  Oral phase dysphagia    History of pharyngeal cancer    Mucositis due to radiation therapy    Oropharyngeal dysphagia    Stenosis of esophagus    Moderate protein-calorie malnutrition  Patient's Body mass index is 17.57 kg/m².  BMI indicates  moderate protein-calorie malnutrition with health conditions related to an underlying condition that include Oral cancer . Weight issue is unchanged. BMI is Information on healthy weight added to patient's after visit summary.  BMI management for patient includes the following: none (medical contraindication).  Trismus  Mildly worse  Dysarthria  Mild  Lymphedema  Persists  Caries involving multiple surfaces of tooth  Worsening  Acquired hypothyroidism  Stable              Assessment & Plan      Continue current management plan.  Patient continues to slowly deteriorate.  He is maintaining his weight at this point in time.  He does not wish to use the G-tube to its full's advantage.  I have asked him to use the G-tube for tube feedings today to supplement his oral intake.  I have also recommended he see a dentist regarding his gingivitis and caries.  Oral health  Dental referral  Continue oral intake  Tube feedings 2 times daily to supplement oral feeds  Call for problems    My Chart:  Patient is using My Chart    Patient understand(s) and agree(s) with the treatment plan as described.      Return in about 3 months (around 6/24/2025) for Recheck Swallowing.        Electronically signed by Alli Card Jr, MD 03/24/25 2:44 PM CDT.

## 2025-04-02 ENCOUNTER — HOSPITAL ENCOUNTER (OUTPATIENT)
Dept: PHYSICAL THERAPY | Facility: HOSPITAL | Age: 75
Setting detail: THERAPIES SERIES
Discharge: HOME | End: 2025-04-02
Payer: MEDICARE

## 2025-04-02 DIAGNOSIS — Z85.819 HISTORY OF PHARYNGEAL CANCER: ICD-10-CM

## 2025-04-02 DIAGNOSIS — I89.0 LYMPHEDEMA: Primary | ICD-10-CM

## 2025-04-02 PROCEDURE — 97140 MANUAL THERAPY 1/> REGIONS: CPT | Performed by: PHYSICAL THERAPIST

## 2025-04-02 NOTE — THERAPY TREATMENT NOTE
Physical Therapy Treatment Note and 30 Day Progress Note  [address] [place] [address]    Patient: Fortino Dennis                                                 Visit Date: 2025  :     1950    Referring practitioner:    Alli Card Jr*  Date of Initial Visit:          Type: THERAPY  Episode: lymphedema        Visit Diagnoses:    ICD-10-CM ICD-9-CM   1. Lymphedema  I89.0 457.1   2. History of pharyngeal cancer  Z85.819 V10.02       SUBJECTIVE     Subjective:  He doesn't think he has gained any weight.     PAIN: 0/10         OBJECTIVE     Objective    Lymphedema       Row Name 25 1400             Subjective Pain    Able to rate subjective pain? yes  -HR      Pre-Treatment Pain Level 0  -HR         Manual Lymphatic Drainage    Manual Lymphatic Drainage initial sequence;head/neck treatment;opened regional lymph nodes  -HR      Initial Sequence full neck;abdomen;diaphragmatic breathing  -HR      Abdomen superficial  -HR      Opened Regional Lymph Nodes axillary  -HR      Axillary right;left  -HR      Head/Neck Treatment pre-auricular nodes;post-auricular nodes;occipital nodes;sublingual nodes;full/complex MLD  -HR      Manual Lymphatic Drainage Comments Used the hand held massager over the anterior neck including the scar tissue.  -HR      Manual Therapy 35  -HR                User Key  (r) = Recorded By, (t) = Taken By, (c) = Cosigned By      Initials Name Provider Type    HR Ellen Rosenbaum, PT, DPT, CLT-JULIA Physical Therapist                        Therapeutic Exercises    09499 Units Comments   Stretched neck into rotation and sidebending.       Cervical extension X 5              Timed Minutes 10          Therapy Education/Self Care 20523   Education offered today Order a size M neck compression garment and upper body bike.   Medbridge Code    Ongoing HEP   Cont self massage and stretches as well as MLD    Timed Minutes        Total Timed Treatment:     45   mins  Total Time of Visit:             45   mins         ASSESSMENT/PLAN     GOALS  Goals                                          Progress Note due by 4/28/25                                                      Recert due by 4/28/2025   STG by: 4 weeks Comments Date Status   Patient will have a good basic understanding of lymphedema and its suggested risk reduction practices Continue to educate each treatment.  He has a good understanding. 10/23 Met/Ongoing   Patient will be independent with remedial HEP for lymphedema He is working on stretching at [facility]. 2/27/25 Partially met   Patient will be independent with self MLD for lymphedema He works on IASTM with the hand held massager, he will do follow up MLD. 10/23 Met             LTG by: 8 Weeks         Patient will have appropriate compression garments for lymphedema maintenance He has not ordered the compression garment, he is planning on doing this. 3/11/25 Ongoing   Patient will have no sign or symptoms of infection  No open areas no weeping /9/4 Met   Patient will be independent with comprehensive maintenance program for lymphedema Patient is working more on the HEP and MLD.  He uses a hand-held massager for his neck as well 3/11/25 Ongoing                                         Assessment & Plan       Plan  Therapy options: will be seen for skilled therapy services  Frequency: 1x week  Duration in weeks: 4         ASSESSMENT: Patient has not made any changes since his last visit. He has to start being more independent with his treatment at home. He needs to order compression at the very least. He is too reliant on having us do the work for him and has to take on what he can do for himself. He does not need to come more than 1 time per week and I have a hard time justifying continuing to see him after this POC.      PLAN:   Will see patient 1 x a week x 4 weeks for MLD.        SIGNATURE: Ellen Haro  Robi PT, DPT, CLT-JULIA, [place] License #: [id number]  Electronically Signed on 4/2/2025        [address] [facility] [place] [address]  [phone]

## 2025-04-03 ENCOUNTER — HOSPITAL ENCOUNTER (OUTPATIENT)
Dept: PHYSICAL THERAPY | Facility: HOSPITAL | Age: 75
Setting detail: THERAPIES SERIES
Discharge: HOME | End: 2025-04-03
Payer: MEDICARE

## 2025-04-03 DIAGNOSIS — Z85.819 HISTORY OF PHARYNGEAL CANCER: ICD-10-CM

## 2025-04-03 DIAGNOSIS — I89.0 LYMPHEDEMA: Primary | ICD-10-CM

## 2025-04-03 PROCEDURE — 97110 THERAPEUTIC EXERCISES: CPT

## 2025-04-03 PROCEDURE — 97140 MANUAL THERAPY 1/> REGIONS: CPT

## 2025-04-03 NOTE — THERAPY TREATMENT NOTE
Physical Therapy Treatment Note  [address] [place] [address]    Patient: Fortino Dennis                                                 Visit Date: 4/3/2025  :     1950    Referring practitioner:    Alli Card Jr*  Date of Initial Visit:          Type: THERAPY  Episode: lymphedema        Visit Diagnoses:    ICD-10-CM ICD-9-CM   1. Lymphedema  I89.0 457.1   2. History of pharyngeal cancer  Z85.819 V10.02       SUBJECTIVE     Subjective:  He doesn't think he has gained any weight. He asks about the pump, he has not ordered the compression.    PAIN: 0/10         OBJECTIVE     Objective    Lymphedema       Row Name 25 1230             Subjective Pain    Able to rate subjective pain? yes  -AL      Pre-Treatment Pain Level 0  -AL      Post-Treatment Pain Level 0  -AL         Manual Lymphatic Drainage    Manual Lymphatic Drainage initial sequence;head/neck treatment;opened regional lymph nodes  -AL      Initial Sequence full neck;abdomen;diaphragmatic breathing  -AL      Abdomen superficial  -AL      Opened Regional Lymph Nodes axillary  -AL      Axillary right;left  -AL      Head/Neck Treatment pre-auricular nodes;post-auricular nodes;occipital nodes;sublingual nodes;full/complex MLD  -AL      Manual Lymphatic Drainage Comments Used the hand held massager over the anterior neck including the scar tissue.  -AL      Manual Therapy 35  -AL                User Key  (r) = Recorded By, (t) = Taken By, (c) = Cosigned By      Initials Name Provider Type    AL Pastora Hamlin PTA, CHIKA Physical Therapist Assistant                          Therapeutic Exercises    81685 Units Comments   Stretched neck into rotation and sidebending.       Cervical extension X 5    Placed silicone 3 scar strips over the scars anterior neck.          Timed Minutes 10          Therapy Education/Self Care 79233   Education offered today Order a size  M neck compression garment and upper body bike.  Stressed importance of ordering the compression garment.    Medbridge Code    Ongoing HEP   Cont self massage and stretches as well as MLD   Timed Minutes        Total Timed Treatment:     45   mins  Total Time of Visit:             45   mins         ASSESSMENT/PLAN     GOALS  Goals                                          Progress Note due by 4/28/25                                                      Recert due by 4/28/2025   STG by: 4 weeks Comments Date Status   Patient will have a good basic understanding of lymphedema and its suggested risk reduction practices Continue to educate each treatment.  He has a good understanding. 10/23 Met/Ongoing   Patient will be independent with remedial HEP for lymphedema He is working on stretching at [facility]. 2/27/25 Partially met   Patient will be independent with self MLD for lymphedema He works on IASTM with the hand held massager, he will do follow up MLD. 10/23 Met             LTG by: 8 Weeks         Patient will have appropriate compression garments for lymphedema maintenance He will check into the compression garment.  4/3/25 Ongoing   Patient will have no sign or symptoms of infection  No open areas no weeping /9/4 Met   Patient will be independent with comprehensive maintenance program for lymphedema Patient is working more on the HEP and MLD.  He uses a hand-held massager for his neck as well 3/11/25 Ongoing                                         Assessment/Plan     ASSESSMENT: Discussed importance of ordering the compression garment as he needs to work on reducing the fluid in the neck.  Also states he needs to be compliant with his home maintenance program for us to continue to see him. Patient did voice understanding.  If he purchases the compression garment we will again start the process for getting a Flexitouch pump.  We did check on this is the past but patient did not turn in the appropriate paperwork.         PLAN:   Will see patient 1 x a week x 4 weeks for MLD.        SIGNATURE: Pastora Hamlin PTA, [place] License #: [id number]  Electronically Signed on 4/3/2025        [address] [facility] [place] [address]  [phone]

## 2025-04-08 ENCOUNTER — HOSPITAL ENCOUNTER (OUTPATIENT)
Dept: PHYSICAL THERAPY | Facility: HOSPITAL | Age: 75
Setting detail: THERAPIES SERIES
Discharge: HOME | End: 2025-04-08
Payer: MEDICARE

## 2025-04-08 DIAGNOSIS — Z85.819 HISTORY OF PHARYNGEAL CANCER: ICD-10-CM

## 2025-04-08 DIAGNOSIS — I89.0 LYMPHEDEMA: Primary | ICD-10-CM

## 2025-04-08 PROCEDURE — 97110 THERAPEUTIC EXERCISES: CPT

## 2025-04-08 PROCEDURE — 97140 MANUAL THERAPY 1/> REGIONS: CPT

## 2025-04-08 NOTE — THERAPY TREATMENT NOTE
Physical Therapy Treatment Note  [address] [place] [address]    Patient: Fortino Dennis                                                 Visit Date: 2025  :     1950    Referring practitioner:    Alli Card Jr*  Date of Initial Visit:          Type: THERAPY  Episode: lymphedema        Visit Diagnoses:    ICD-10-CM ICD-9-CM   1. Lymphedema  I89.0 457.1   2. History of pharyngeal cancer  Z85.819 V10.02       SUBJECTIVE     Subjective:  He is not having any pain, he has not ordered the compression garment from [employer] yet.     PAIN: 0/10         OBJECTIVE     Objective    Lymphedema       Row Name 25 1045             Subjective Pain    Able to rate subjective pain? yes  -AL      Pre-Treatment Pain Level 0  -AL      Post-Treatment Pain Level 0  -AL         Manual Lymphatic Drainage    Manual Lymphatic Drainage initial sequence;head/neck treatment;opened regional lymph nodes  -AL      Initial Sequence full neck;abdomen;diaphragmatic breathing  -AL      Abdomen superficial  -AL      Diaphragmatic Breathing x 9 with abdomen  -AL      Opened Regional Lymph Nodes axillary  -AL      Axillary right;left  -AL      Head/Neck Treatment pre-auricular nodes;post-auricular nodes;occipital nodes;sublingual nodes;full/complex MLD  -AL      Manual Lymphatic Drainage Comments Continue to use the hand held massager to the anterior neck.  -AL      Manual Therapy 45  -AL         Compression/Skin Care    Compression/Skin Care Comments Order compression garment  -AL                User Key  (r) = Recorded By, (t) = Taken By, (c) = Cosigned By      Initials Name Provider Type    AL Pasotra Hamlin, PTA, CLT-JULIA Physical Therapist Assistant                          Therapeutic Exercises    69938 Units Comments   Stretched neck into rotation and sidebending.  Also stretched each SCM     Cervical extension X 5    Placed 4 silicone scar strips  over the scars anterior neck.     UBE alternating forward/backwards each x 2 min level 1     Timed Minutes 10          Therapy Education/Self Care 46478   Education offered today Order a size M neck compression garment and upper body bike.  Stressed importance of ordering the compression garment.    Medbridge Code    Ongoing HEP   Cont self massage and stretches as well as MLD   Timed Minutes        Total Timed Treatment:     55   mins  Total Time of Visit:             55   mins         ASSESSMENT/PLAN     GOALS  Goals                                          Progress Note due by 4/28/25                                                      Recert due by 4/28/2025   STG by: 4 weeks Comments Date Status   Patient will have a good basic understanding of lymphedema and its suggested risk reduction practices Continue to educate each treatment.  He has a good understanding. 10/23 Met/Ongoing   Patient will be independent with remedial HEP for lymphedema He is working on stretching at [facility]. 2/27/25 Partially met   Patient will be independent with self MLD for lymphedema He works on [employer] with the hand held massager, he will do follow up MLD. 10/23 Met             LTG by: 8 Weeks         Patient will have appropriate compression garments for lymphedema maintenance He will order the compression garment this week. 4/8/25 Ongoing   Patient will have no sign or symptoms of infection  No open areas no weeping /9/4 Met   Patient will be independent with comprehensive maintenance program for lymphedema Patient is working more on the HEP and MLD.  He uses a hand-held massager for his neck as well 3/11/25 Ongoing                                         Assessment/Plan     ASSESSMENT: He has not ordered the compression garment yet, we did discuss that we would not be able to continue to see patient if he does not order the compression. He promises he will place the order before his next visit. The scar tape did help soften the scars  on the anterior neck.       PLAN:   Will see patient 1 x a week x 4 weeks for MLD.        SIGNATURE: Pastora Hamlin PTA, [place] License #: [id number]  Electronically Signed on 4/8/2025        [address] [place] [address]  [phone]

## 2025-04-10 ENCOUNTER — HOSPITAL ENCOUNTER (OUTPATIENT)
Dept: PHYSICAL THERAPY | Facility: HOSPITAL | Age: 75
Setting detail: THERAPIES SERIES
Discharge: HOME | End: 2025-04-10
Payer: MEDICARE

## 2025-04-10 DIAGNOSIS — Z85.819 HISTORY OF PHARYNGEAL CANCER: ICD-10-CM

## 2025-04-10 DIAGNOSIS — R13.12 OROPHARYNGEAL DYSPHAGIA: ICD-10-CM

## 2025-04-10 DIAGNOSIS — I89.0 LYMPHEDEMA: Primary | ICD-10-CM

## 2025-04-10 PROCEDURE — 97110 THERAPEUTIC EXERCISES: CPT

## 2025-04-10 PROCEDURE — 97140 MANUAL THERAPY 1/> REGIONS: CPT

## 2025-04-10 NOTE — THERAPY TREATMENT NOTE
Physical Therapy Treatment Note  [address] [place] [address]    Patient: Fortino Dennis                                                 Visit Date: 4/10/2025  :     1950    Referring practitioner:    Alli Card Jr*  Date of Initial Visit:          Type: THERAPY  Episode: lymphedema        Visit Diagnoses:    ICD-10-CM ICD-9-CM   1. Lymphedema  I89.0 457.1   2. History of pharyngeal cancer  Z85.819 V10.02   3. Oropharyngeal dysphagia  R13.12 787.22       SUBJECTIVE     Subjective:  He has ordered the compression garment, it should be in tomorrow.     PAIN: 0/10         OBJECTIVE     Objective    Lymphedema       Row Name 04/10/25 1100             Subjective Pain    Able to rate subjective pain? yes  -AL      Pre-Treatment Pain Level 0  -AL      Post-Treatment Pain Level 0  -AL         Manual Lymphatic Drainage    Manual Lymphatic Drainage initial sequence;head/neck treatment;opened regional lymph nodes  -AL      Initial Sequence full neck;abdomen;diaphragmatic breathing  -AL      Abdomen superficial  -AL      Diaphragmatic Breathing x 9 with abdomen  -AL      Opened Regional Lymph Nodes axillary  -AL      Axillary right;left  -AL      Head/Neck Treatment pre-auricular nodes;post-auricular nodes;occipital nodes;sublingual nodes;full/complex MLD  -AL      Manual Lymphatic Drainage Comments Used the hand held massager over the anterior neck.  -AL      Manual Therapy 30  -AL         Compression/Skin Care    Compression/Skin Care compression garment  -AL      Compression/Skin Care Comments Wear compression garment when it comes in.  -AL                User Key  (r) = Recorded By, (t) = Taken By, (c) = Cosigned By      Initials Name Provider Type    AL Pastora Hamlin, PTA, CLT-JULIA Physical Therapist Assistant                        Therapeutic Exercises    56493 Units Comments   Stretched neck into rotation and sidebending.   Also stretched each SCM     Placed 2 wide silicone scar strips over the scars anterior neck.          Timed Minutes 10          Therapy Education/Self Care 73273   Education offered today Order a size M neck compression garment and upper body bike.  Stressed importance of ordering the compression garment.    Medbridge Code    Ongoing HEP   Cont self massage and stretches as well as MLD   Timed Minutes        Total Timed Treatment:     40   mins  Total Time of Visit:             40   mins         ASSESSMENT/PLAN     GOALS  Goals                                          Progress Note due by 4/28/25                                                      Recert due by 4/28/2025   STG by: 4 weeks Comments Date Status   Patient will have a good basic understanding of lymphedema and its suggested risk reduction practices Continue to educate each treatment.  He has a good understanding. 10/23 Met/Ongoing   Patient will be independent with remedial HEP for lymphedema He is working on stretching at [facility]. 2/27/25 Partially met   Patient will be independent with self MLD for lymphedema He works on [employer] with the hand held massager, he will do follow up MLD. 10/23 Met             LTG by: 8 Weeks         Patient will have appropriate compression garments for lymphedema maintenance He has ordered the compression 4/10/25 Ongoing   Patient will have no sign or symptoms of infection  No open areas no weeping /9/4 Met   Patient will be independent with comprehensive maintenance program for lymphedema Patient is working more on the HEP and MLD.  He uses a hand-held massager for his neck as well 3/11/25 Ongoing                                         Assessment/Plan     ASSESSMENT: Patient has ordered the compression garment, the garment should be in tomorrow.  This should help the swelling in the neck, will start wearing the compression when it comes in.  A cherry pit pack to put inside the compression garment over the anterior neck.  I  do believe the scar strip is helping to soften the scars anterior neck.      PLAN:   Will see patient 1 x a week x 4 weeks for MLD.        SIGNATURE: Pastora Hamlin PTA, [place], [place] License #: [id number]  Electronically Signed on 4/10/2025        [address] [place] [address]. [address]  [phone]

## 2025-04-16 ENCOUNTER — HOSPITAL ENCOUNTER (OUTPATIENT)
Dept: PHYSICAL THERAPY | Facility: HOSPITAL | Age: 75
Setting detail: THERAPIES SERIES
Discharge: HOME | End: 2025-04-16
Payer: MEDICARE

## 2025-04-16 DIAGNOSIS — I89.0 LYMPHEDEMA: Primary | ICD-10-CM

## 2025-04-16 DIAGNOSIS — Z85.819 HISTORY OF PHARYNGEAL CANCER: ICD-10-CM

## 2025-04-16 DIAGNOSIS — R13.12 OROPHARYNGEAL DYSPHAGIA: ICD-10-CM

## 2025-04-16 PROCEDURE — 97140 MANUAL THERAPY 1/> REGIONS: CPT | Performed by: PHYSICAL THERAPIST

## 2025-04-17 ENCOUNTER — HOSPITAL ENCOUNTER (OUTPATIENT)
Dept: PHYSICAL THERAPY | Facility: HOSPITAL | Age: 75
Setting detail: THERAPIES SERIES
Discharge: HOME | End: 2025-04-17
Payer: MEDICARE

## 2025-04-17 DIAGNOSIS — I89.0 LYMPHEDEMA: Primary | ICD-10-CM

## 2025-04-17 DIAGNOSIS — Z85.819 HISTORY OF PHARYNGEAL CANCER: ICD-10-CM

## 2025-04-17 PROCEDURE — 97110 THERAPEUTIC EXERCISES: CPT

## 2025-04-17 PROCEDURE — 97140 MANUAL THERAPY 1/> REGIONS: CPT

## 2025-04-17 NOTE — THERAPY TREATMENT NOTE
Physical Therapy Treatment Note  [address] [place] [address]    Patient: Fortino Dennis                                                 Visit Date: 2025  :     1950    Referring practitioner:    Alli Card Jr*  Date of Initial Visit:          Type: THERAPY  Episode: lymphedema        Visit Diagnoses:    ICD-10-CM ICD-9-CM   1. Lymphedema  I89.0 457.1   2. History of pharyngeal cancer  Z85.819 V10.02       SUBJECTIVE     Subjective:  He has the garment but he forgot it at home, he has not tried the garment yet,     PAIN: 0/10         OBJECTIVE     Objective    Lymphedema       Row Name 25 1215             Subjective Pain    Able to rate subjective pain? yes  -AL      Pre-Treatment Pain Level 0  -AL      Post-Treatment Pain Level 0  -AL         Manual Lymphatic Drainage    Manual Lymphatic Drainage initial sequence;head/neck treatment;opened regional lymph nodes  -AL      Initial Sequence full neck;abdomen;diaphragmatic breathing  -AL      Abdomen superficial  -AL      Diaphragmatic Breathing x 9 with abdomen  -AL      Opened Regional Lymph Nodes axillary  -AL      Axillary right;left  -AL      Head/Neck Treatment pre-auricular nodes;post-auricular nodes;occipital nodes;sublingual nodes;full/complex MLD  -AL      Manual Lymphatic Drainage Comments Used the hand held massager over the anterior neck.  -AL      Manual Therapy 45  -AL         Compression/Skin Care    Compression/Skin Care compression garment  -AL      Compression/Skin Care Comments Wear compression daily  -AL                User Key  (r) = Recorded By, (t) = Taken By, (c) = Cosigned By      Initials Name Provider Type    AL Pastora Hamlin, PTA, CLT-JULIA Physical Therapist Assistant                          Therapeutic Exercises    71358 Units Comments   Stretched neck into rotation and sidebending.  Also stretched each SCM     Placed 2 wide silicone  scar strips over the scars anterior neck.          Timed Minutes 10          Therapy Education/Self Care 67734   Education offered today Wear the compression garment   Medbridge Code    Ongoing HEP   Cont self massage and stretches as well as MLD   Timed Minutes        Total Timed Treatment:     55   mins  Total Time of Visit:             55   mins         ASSESSMENT/PLAN     GOALS  Goals                                          Progress Note due by 4/28/25                                                      Recert due by 4/28/2025   STG by: 4 weeks Comments Date Status   Patient will have a good basic understanding of lymphedema and its suggested risk reduction practices Continue to educate each treatment.  He has a good understanding. 10/23 Met/Ongoing   Patient will be independent with remedial HEP for lymphedema He is working on stretching at [facility]. 2/27/25 Partially met   Patient will be independent with self MLD for lymphedema He works on [employer] with the hand held massager, he will do follow up MLD. 10/23 Met             LTG by: 8 Weeks         Patient will have appropriate compression garments for lymphedema maintenance The compression garment is in. 4/17/25 Ongoing   Patient will have no sign or symptoms of infection  No open areas no weeping 9/4 Met   Patient will be independent with comprehensive maintenance program for lymphedema Patient is working more on the HEP and MLD.  He uses a hand-held massager for his neck as well 3/11/25 Ongoing                                         Assessment/Plan     ASSESSMENT: He is going to start wearing the compression garment, he will bring it in next treatment.  He is still wearing the scar tape, this is helping the scar become more smooth. When the scar is not as adhered the fluid should be able to move out of the neck.  Patient can tell the neck is not as swollen.  I will make a pit pack next treatment to go under the compression garment.       PLAN:   Will see patient  1 x a week x 4 weeks for MLD.        SIGNATURE: Pastora Hamlin PTA, [facility], [place] License #: [id number]  Electronically Signed on 4/17/2025        [address] [place] [address]  [phone]

## 2025-04-21 NOTE — THERAPY TREATMENT NOTE
Physical Therapy Treatment Note  [address] [place] [address]    Patient: Fortino Dennis                                                 Visit Date: 2025  :     1950    Referring practitioner:    Alli Card Jr*  Date of Initial Visit:          Type: THERAPY  Episode: lymphedema        Visit Diagnoses:    ICD-10-CM ICD-9-CM   1. Lymphedema  I89.0 457.1   2. History of pharyngeal cancer  Z85.819 V10.02   3. Oropharyngeal dysphagia  R13.12 787.22       SUBJECTIVE     Subjective:  He has the compression garment but hasn't tried it on yet. He meant to bring it but forgot.     PAIN: 0/10         OBJECTIVE     Objective        25 1000   Subjective Pain   Able to rate subjective pain? yes   Pre-Treatment Pain Level 0   Manual Lymphatic Drainage   Manual Lymphatic Drainage initial sequence;head/neck treatment;opened regional lymph nodes   Initial Sequence full neck;abdomen;diaphragmatic breathing   Abdomen superficial   Opened Regional Lymph Nodes axillary   Axillary right;left   Head/Neck Treatment pre-auricular nodes;post-auricular nodes;occipital nodes;sublingual nodes;full/complex MLD   Manual Lymphatic Drainage Comments Used the hand held massager over the anterior neck.   Manual Therapy 40   Compression/Skin Care   Compression/Skin Care Comments Try the compression                Therapy Education/Self Care 49652   Education offered today Try the compression garment at home and bring it to next visit.    Medbridge Code    Ongoing HEP   Cont self massage and stretches as well as MLD   Timed Minutes        Total Timed Treatment:     40   mins  Total Time of Visit:             40   mins         ASSESSMENT/PLAN     GOALS  Goals                                          Progress Note due by 25                                                      Recert due by 2025   STG by: 4 weeks Comments Date Status   Patient  will have a good basic understanding of lymphedema and its suggested risk reduction practices Continue to educate each treatment.  He has a good understanding. 10/23 Met/Ongoing   Patient will be independent with remedial HEP for lymphedema He is working on stretching at [facility]. 2/27/25 Partially met   Patient will be independent with self MLD for lymphedema He works on [employer] with the hand held massager, he will do follow up MLD. 10/23 Met             LTG by: 8 Weeks         Patient will have appropriate compression garments for lymphedema maintenance He has the compression but forgot to bring it today 4/16/25 Ongoing   Patient will have no sign or symptoms of infection  No open areas no weeping /9/4 Met   Patient will be independent with comprehensive maintenance program for lymphedema Patient is working more on the HEP and MLD.  He uses a hand-held massager for his neck as well 3/11/25 Ongoing                                         Assessment/Plan     ASSESSMENT: Patient has the compression garment. He had promised [employer] he would order it. But he hasn't tried it on yet and he forgot to bring it with him today. I had him promise to bring it to the next appointment.       PLAN:   Will see patient 1 x a week x 4 weeks for MLD.        SIGNATURE: Ellen Rosenbaum, PT, DPT, [place] License #: [id number]  Electronically Signed on 4/21/2025        [address] [place] [address] [place]. [address]  [phone]

## 2025-04-22 ENCOUNTER — HOSPITAL ENCOUNTER (OUTPATIENT)
Dept: PHYSICAL THERAPY | Facility: HOSPITAL | Age: 75
Setting detail: THERAPIES SERIES
Discharge: HOME | End: 2025-04-22
Payer: MEDICARE

## 2025-04-22 DIAGNOSIS — Z85.819 HISTORY OF PHARYNGEAL CANCER: ICD-10-CM

## 2025-04-22 DIAGNOSIS — I89.0 LYMPHEDEMA: Primary | ICD-10-CM

## 2025-04-22 PROCEDURE — 97140 MANUAL THERAPY 1/> REGIONS: CPT

## 2025-04-22 PROCEDURE — 97110 THERAPEUTIC EXERCISES: CPT

## 2025-04-22 NOTE — THERAPY TREATMENT NOTE
Physical Therapy Treatment Note  [address] [place] [address]    Patient: Fortino Dennis                                                 Visit Date: 2025  :     1950    Referring practitioner:    Alli Card Jr*  Date of Initial Visit:          Type: THERAPY  Episode: lymphedema        Visit Diagnoses:    ICD-10-CM ICD-9-CM   1. Lymphedema  I89.0 457.1   2. History of pharyngeal cancer  Z85.819 V10.02       SUBJECTIVE     Subjective:  He has been wearing the garment, he wears the garment at night and it is comfortable.     PAIN: 0/10         OBJECTIVE     Objective    Lymphedema       Row Name 25 1445             Subjective Pain    Able to rate subjective pain? yes  -AL      Pre-Treatment Pain Level 0  -AL      Post-Treatment Pain Level 0  -AL         Lymphedema Measurements    Measurement Type(s) Circumferential  -AL      Circumferential Areas Neck  -AL         Head Circumferential (cm)    Measurement Location 1 Upper lip  -AL      Head 1 46.2 cm  -AL      Measurement Location 2 Lower lip  -AL      Head 2 44.4 cm  -AL      Head Circumferential Total 90.6 cm  -AL         Neck Circumferential (cm)    Measurement Location 1 Sarabjit's apple  -AL      Neck 1 33 cm  -AL      Neck Circumferential Total 33 cm  -AL         Manual Lymphatic Drainage    Manual Lymphatic Drainage initial sequence;head/neck treatment;opened regional lymph nodes  -AL      Initial Sequence full neck;abdomen;diaphragmatic breathing  -AL      Abdomen superficial  -AL      Diaphragmatic Breathing x 9 with abdomen  -AL      Opened Regional Lymph Nodes axillary  -AL      Axillary right;left  -AL      Head/Neck Treatment pre-auricular nodes;post-auricular nodes;occipital nodes;sublingual nodes;full/complex MLD  -AL      Manual Lymphatic Drainage Comments Used the hand held massager over the anterior neck.  -AL      Manual Therapy 45  -AL          Compression/Skin Care    Compression/Skin Care compression garment  -AL      Compression/Skin Care Comments Wear compression daily with pit pack  -AL                User Key  (r) = Recorded By, (t) = Taken By, (c) = Cosigned By      Initials Name Provider Type    Pastora Serrano, PTA, CLT-JULIA Physical Therapist Assistant                      Therapeutic Exercises    03377 Units Comments   Stretched neck into rotation and sidebending.  Also stretched each SCM     Showed patient how to use the pit pack under the compression garment          Timed Minutes 10          Therapy Education/Self Care 66197   Education offered today Wear the compression garment with the pit pack.  Fill form out for Flexitouch pump.   Medbridge Code    Ongoing HEP   Cont self massage and stretches as well as MLD   Timed Minutes        Total Timed Treatment:     55   mins  Total Time of Visit:             55   mins         ASSESSMENT/PLAN     GOALS  Goals                                          Progress Note due by 4/28/25                                                      Recert due by 4/28/2025   STG by: 4 weeks Comments Date Status   Patient will have a good basic understanding of lymphedema and its suggested risk reduction practices Continue to educate each treatment.  He has a good understanding. 10/23 Met/Ongoing   Patient will be independent with remedial HEP for lymphedema He is working on stretching at [facility]. 2/27/25 Partially met   Patient will be independent with self MLD for lymphedema He works on [employer] with the hand held massager, he will do follow up MLD. 10/23 Met             LTG by: 8 Weeks         Patient will have appropriate compression garments for lymphedema maintenance Will wear the compression garment with the pit pack. 4/22/2025 Ongoing   Patient will have no sign or symptoms of infection  No open areas no weeping 9/4 Met   Patient will be independent with comprehensive maintenance program for lymphedema Patient is  interested in resuming the Flexitouch pump  4/22/2025 Ongoing                                         Assessment/Plan     ASSESSMENT: The compression garment is a good fit, I did make a cherry pit pack for patient to wear inside the compression garment.  Patient has had a reduction in the size of the neck and face since his initial evaluation.  The tissue is softer along the scar, I am hopeful the cherry pit pack with the compression garment will help soften the more dense tissue.  Patient is interested in pursuing the Flexitouch pump.      PLAN:   Will see patient 1 x a week x 4 weeks for MLD.        SIGNATURE: Pastora Hamlin PTA, [place] License #: [id number]  Electronically Signed on 4/22/2025        [address] [place] [address]  [phone]

## 2025-04-23 ENCOUNTER — APPOINTMENT (OUTPATIENT)
Dept: PHYSICAL THERAPY | Facility: HOSPITAL | Age: 75
End: 2025-04-23
Payer: MEDICARE

## 2025-04-28 NOTE — PROGRESS NOTES
[facility] ON[facility] HEMATOLOGY & ONCOLOGY  [address]  [facility] [address] [phone]    Patient Name: Fortino Dennis  Encounter Date: 05/06/2025  YOB: 1950  Patient Number: [id number]      REASON FOR FOLLOW-UP: Fortino Dennis is a pleasant 74 y.o. male who is seen on follow-up for squamous cell carcinoma, base of tongue diagnosed in 2007.  He had undergone left neck dissection after neoadjuvant chemo with radiation, 33 fractions in [place], [place].  Details are not available.  He is seen alone. History is obtained from patient.  Patient is a reliable historian.           DIAGNOSTIC ABNORMALITIES:   He presented with a left neck mass 05/2007 by Dr. Galicia.  He had undergone neoadjuvant chemo with radiation 33 fractions.   He had undergone left neck dissection.  Fine-needle aspiration 5/31/2007.  Pathology report 6/1/2007 from [facility] showed squamous cell carcinoma.  Colonoscopy by Dr. Imtiaz Summers 3/21/2014.  A few small mouth diverticula were found in the sigmoid colon.  The exam was otherwise without abnormality on direct and retroflexion views.  Pathology report 9/12/2017.  Gastric fundic mucosa and cardiac mucosa showed no dysplasia.  He had seen Dr. Fer Muhammad [facility] 9/26/2017 for gastroesophageal reflux disease, hiatal hernia and esophagitis.   Pathology report 10/20/2017 mediastinal mass excision.  Benign fibroadipose tissue.  8 small benign lymph nodes.  He had undergone endoscopy with dilatation and biopsy.  Findings showed normal duodenum.  Mild diffuse gastritis, biopsy taken from antrum and incisura.  Some apparent irregularity of the gastric cardia by the GE junction.  This was biopsied several times for pathology.  3 to 4 cm hiatal hernia.  GE junction at 43 cm.  Distal esophagitis class I or grade A.  He had undergone placement of LINX antireflux device on 10/19/2017 by Dr. Fer Muhammad,  [facility]. Patient referred to oncology.  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  He had undergone left segmental mandibulectomy.  Osteotomy and plate reconstruction of mandible. Local flap reconstruction, oral cavity approximately 12 cm² dissection in the background of prior oral cancer treatment with radiation necrosis of mandible on 4/19/2021.   Pathology report 4/26/2021.  Acute osteomyelitis with bone resorption and involvement of the posterior surgical margin.  He had undergone laryngoscopy, esophagoscopy, flexible bronchoscopy and dilation of esophageal stricture 11/17/2021.  Patient seen by JONATHAN Schmidt 2/14/2023 for hypertension.  He continued to have chewing difficulty and swallowing difficulty.  He had required feeding tube in 2001.  Patient referred to oncology           PREVIOUS INTERVENTIONS:  Patient underwent neoadjuvant chemo with radiation 33 fractions followed by a left neck dissection at [facility] in 2007.         Oncology/Hematology History   Squamous cell carcinoma of pharynx   6/15/2023 Initial Diagnosis    Squamous cell carcinoma of pharynx     10/25/2023 - 10/25/2023 Other Event    ENT surveillance  Patient with no evidence of recurrent disease.  He has severe trismus  The left mandibular free flap appears intact  Neck has banding from prior multiple surgeries and radiation  Patient appears to be cancer free at this time.  He wishes continued surveillance.  Alli Card Jr, MD  10/25/2023  12:59 CDT       4/29/2024 - 4/29/2024 Other Event    ENT oncologic surveillance:  Oncologic surveillance carried out today.  No evidence of recurrence.  Likely given that the patient is greater than 5 years out.  Oral and oropharyngeal dysphagia worsening slowly.  I feel this is due to brawny induration.    .cmnsignme     7/31/2024 - 7/31/2024 Other Event    ENT oncologic surveillance:  ENT oncologic surveillance carried out with no signs of  recurrent disease.  Patient has significant trismus and oral as well as oropharyngeal dysphagia.  While the patient is out of the typical recurrent cancer.,  I will continue to follow because of his postradiation and postsurgical changes contributing to his weight loss and oropharyngeal dysfunction.    Alli Card Jr, MD  7/31/2024  12:30 CDT           PAST MEDICAL HISTORY:  ALLERGIES:  No Known Allergies  CURRENT MEDICATIONS:  Outpatient Encounter Medications as of 5/6/2025   Medication Sig Dispense Refill    acetaminophen (Tylenol) 325 MG tablet Take 3 tablets by mouth Every 8 (Eight) Hours. Take every 8 hours for 3 days then take prn as needed.      chlorhexidine (PERIDEX) 0.12 % solution RINSE MOUTH WITH 5-10 ML FOR 1 MINUTE AFTER BRUSHING AND SPIT OUT ONCE DAILY      donepezil (ARICEPT) 5 MG tablet Take 1 tablet by mouth Every Night. 90 tablet 1    levothyroxine (Synthroid) 75 MCG tablet Take 1 tablet by mouth Every Morning. 90 tablet 1    nystatin (MYCOSTATIN) 100,000 unit/mL suspension Swish and swallow 5 mL 4 (Four) Times a Day. 473 mL 1    Restasis 0.05 % ophthalmic emulsion Administer 1 drop to both eyes 2 (Two) Times a Day.      rivaroxaban (XARELTO) 20 MG tablet Take 1 tablet by mouth Daily. 90 tablet 1    SENNA PO Take 1 capsule by mouth Daily.      venlafaxine XR (Effexor XR) 150 MG 24 hr capsule Take 1 capsule by mouth Daily. 90 capsule 1    zolpidem (AMBIEN) 10 MG tablet Take 1 tablet by mouth At Night As Needed for Sleep. 30 tablet 2    [DISCONTINUED] zolpidem (AMBIEN) 10 MG tablet Take 1 tablet by mouth At Night As Needed for Sleep. 30 tablet 2     No facility-administered encounter medications on file as of 5/6/2025.     ADULT ILLNESSES:  Patient Active Problem List   Diagnosis Code    Primary hypertension I10    Hyperlipidemia E78.5    Stenosis of esophagus K22.2    History of pharyngeal cancer Z85.819    Moderate protein-calorie malnutrition E44.0    Acquired hypothyroidism E03.9    Moderate  episode of recurrent major depressive disorder F33.1    Oral phase dysphagia R13.11    Oropharyngeal dysphagia R13.12    Squamous cell carcinoma of pharynx C14.0    Malignant neoplasm of lateral wall of oropharynx C10.2    Mucositis due to radiation therapy K12.33    Mild aortic stenosis I35.0    Mild mitral regurgitation I34.0    New onset atrial fibrillation I48.91    Encounter for PEG (percutaneous endoscopic gastrostomy) Z43.1     SURGERIES:  Past Surgical History:   Procedure Laterality Date    CATARACT EXTRACTION WITH INTRAOCULAR LENS IMPLANT Bilateral     pt states x3 surgeries    ENDOSCOPY N/A 9/19/2024    Procedure: ESOPHAGOGASTRODUODENOSCOPY WITH PERCUTANEOUS ENDOSCOPIC GASTROSTOMY TUBE INSERTION WITH ANESTHESIA;  Surgeon: Anny Pearce MD;  Location: [facility] ENDOSCOPY;  Service: General;  Laterality: N/A;  Pre: Peg tube insertion;  Post: Peg tube inserted;  Fortino Sierra, DO    GTUBE INSERTION      LARYNGOSCOPY Bilateral 07/07/2023    Procedure: MICRODIRECT LARYNGOSCOPY;  Surgeon: Alli Card Jr., MD;  Location: [facility] OR;  Service: ENT;  Laterality: Bilateral;    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    MANDIBLE SURGERY  2021    removal of necrotic jaw (part of 1 of 2 surgeries)    MASTECTOMY Left     removal of breast due to benign growth    PANENDOSCOPY Bilateral 07/07/2023    Procedure: DIRECT LARYNGOSCOPY, ESOPHAGOSCOPY, BRONCHOSCOPY, MICRODIRECT LARYNGOSCOPY,OR ESOPHAGEAL DILATATION 38-48;  Surgeon: Alli Card Jr., MD;  Location: [facility] OR;  Service: ENT;  Laterality: Bilateral;    THROAT SURGERY  2007    cancerous mass removed     HEALTH MAINTENANCE ITEMS:  Health Maintenance Due   Topic Date Due    TDAP/TD VACCINES (1 - Tdap) Never done    ZOSTER VACCINE (1 of 2) Never done    COVID-19 Vaccine (6 - 2024-25 season) 04/18/2025       <no information>  Last Completed Colonoscopy            Completed or No Longer Recommended       COLORECTAL CANCER  "SCREENING  Discontinued        Frequency changed to Never automatically (Topic No Longer Applies)    09/17/2021  Outside Procedure: NE COLORECTAL SCRN; HI RISK IND    03/21/2014  SCANNED - COLONOSCOPY                          Immunization History   Administered Date(s) Administered    Arexvy (RSV, Adults 60+ yrs) 10/18/2024    COVID-19 (MODERNA) 12YRS+ (SPIKEVAX) 10/18/2024    COVID-19 (PFIZER) 12YRS+ (COMIRNATY) 05/08/2024    COVID-19 (PFIZER) BIVALENT 12+YRS 10/11/2022    COVID-19 (PFIZER) Purple Cap Monovalent 11/21/2021, 06/14/2022    Fluzone High-Dose 65+YRS 10/18/2024    Fluzone High-Dose 65+yrs 01/08/2024    Influenza, Unspecified 10/11/2022    Pneumococcal Conjugate 20-Valent (PCV20) 06/02/2023     Last Completed Mammogram    This patient has no relevant Health Maintenance data.           FAMILY HISTORY:  Family History   Problem Relation Age of Onset    Cancer Mother     Hypertension Mother     Dementia Mother     Colon cancer Mother     Colon polyps Neg Hx      SOCIAL HISTORY:  Social History     Socioeconomic History    Marital status: Single   Tobacco Use    Smoking status: Never     Passive exposure: Never    Smokeless tobacco: Never    Tobacco comments:     Zero -- none   Vaping Use    Vaping status: Never Used   Substance and Sexual Activity    Alcohol use: Yes     Alcohol/week: 3.0 standard drinks of alcohol     Types: 3 Cans of beer per week     Comment: daily beers    Drug use: Never    Sexual activity: Not Currently     Partners: Female       REVIEW OF SYSTEMS:    Review of Systems   Constitutional:  Positive for fatigue. Negative for chills and fever.        \"I am okay.\"   HENT:  Negative for congestion, trouble swallowing and voice change.    Eyes:  Negative for discharge and redness.   Respiratory:  Negative for shortness of breath and wheezing.    Cardiovascular:  Negative for chest pain and palpitations.   Gastrointestinal:  Negative for abdominal pain, nausea and vomiting.   Endocrine: " "Negative for cold intolerance and heat intolerance.   Genitourinary:  Negative for dysuria and hematuria.   Musculoskeletal:  Negative for gait problem and myalgias.   Skin:  Negative for pallor.   Allergic/Immunologic: Negative for food allergies.   Neurological:  Negative for dizziness, seizures and weakness.   Hematological:  Negative for adenopathy. Does not bruise/bleed easily.   Psychiatric/Behavioral:  Negative for agitation and confusion. The patient is not nervous/anxious.        VITAL SIGNS: /70   Pulse 86   Temp 96.9 °F (36.1 °C)   Resp 16   Ht 185.4 cm (73\")   Wt 61.8 kg (136 lb 4.8 oz)   SpO2 98%   BMI 17.98 kg/m²  Lost 10 pounds. \"Hard for me to eat. Surgery from my jaw. My feeding tube opening is too narrow. I want a wider opening. I am seeing Dr. Pearce.  I need to call her office.\"        PHYSICAL EXAMINATION:     Physical Exam  Vitals reviewed.   Constitutional:       General: He is not in acute distress.     Comments: He arrived in the exam room with a cane.   HENT:      Head: Normocephalic and atraumatic.   Eyes:      General: No scleral icterus.  Cardiovascular:      Rate and Rhythm: Normal rate.   Pulmonary:      Effort: No respiratory distress.      Breath sounds: No wheezing.   Abdominal:      General: Bowel sounds are normal.      Palpations: Abdomen is soft.      Comments: +PEG.   Musculoskeletal:         General: No swelling.   Skin:     Coloration: Skin is not pale.   Neurological:      Mental Status: He is alert and oriented to person, place, and time.   Psychiatric:         Mood and Affect: Mood normal.         Behavior: Behavior normal.         Thought Content: Thought content normal.         Judgment: Judgment normal.         LABS    Lab Results - Last 18 Months   Lab Units 05/06/25  1228 02/13/25  1348 03/15/24  1046   HEMOGLOBIN g/dL 13.6 13.5 12.9*   HEMATOCRIT % 41.3 42.7 40.1   MCV fL 99.3* 102.2* 100.5*   WBC 10*3/mm3 6.07 6.48 5.31   RDW % 12.8 13.5 12.8   MPV fL " "8.1 8.2 8.4   PLATELETS 10*3/mm3 177 178 212   IMM GRAN % % 0.2 0.2 0.4   NEUTROS ABS 10*3/mm3 4.21 4.36 3.75   LYMPHS ABS 10*3/mm3 0.95 0.94 0.76   MONOS ABS 10*3/mm3 0.65 0.84 0.65   EOS ABS 10*3/mm3 0.21 0.27 0.11   BASOS ABS 10*3/mm3 0.04 0.06 0.02   IMMATURE GRANS (ABS) 10*3/mm3 0.01 0.01 0.02   NRBC /100 WBC 0.0 0.0 0.0       Lab Results - Last 18 Months   Lab Units 05/06/25  1228 02/13/25  1348 03/15/24  1046   GLUCOSE mg/dL 78 90 120*   SODIUM mmol/L 138 140 142   POTASSIUM mmol/L 4.0 3.9 4.0   CO2 mmol/L 27.0 29.0 30.0*   CHLORIDE mmol/L 101 102 104   ANION GAP mmol/L 10.0 9.0 8.0   CREATININE mg/dL 0.40* 0.41* 0.45*   BUN mg/dL 8 9 14   BUN / CREAT RATIO  20.0 22.0 31.1*   CALCIUM mg/dL 9.3 9.1 8.8   ALK PHOS U/L 103 84 88   TOTAL PROTEIN g/dL 7.1 7.1 6.7   ALT (SGPT) U/L 21 12 14   AST (SGOT) U/L 26 21 18   BILIRUBIN mg/dL 0.9 0.6 0.5   ALBUMIN g/dL 4.2 4.0 4.0   GLOBULIN gm/dL 2.9 3.1 2.7       No results for input(s): \"MSPIKE\", \"KAPPALAMB\", \"IGLFLC\", \"URICACID\", \"FREEKAPPAL\", \"CEA\", \"LDH\", \"REFLABREPO\" in the last 08060 hours.    Lab Results - Last 18 Months   Lab Units 05/06/25  1228 02/13/25  1348 03/15/24  1046   IRON mcg/dL  --   --  90   TIBC mcg/dL  --   --  374   IRON SATURATION (TSAT) %  --   --  24   FERRITIN ng/mL  --   --  101.10   TSH uIU/mL 4.880* 10.420* 3.830   FOLATE ng/mL  --   --  >20.00       Fortino KEANE Sonny reports a pain score of 0.      ASSESSMENT:  1.  Squamous cell carcinoma, base of the tongue.  Diagnosed in 2007.  AJCC stage: (T1, N0, M0).  Treatment status: Post neck dissection and neoadjuvant chemoradiation.  2.  Performance status of 1.  3.  Macrocytic anemia from previous chemo.  Observation.  4.  Dysphagia.  Tube feeding 2001 through Spring 2023.  5.  Radionecrosis of the jaw underwent left segmental mandibulectomy.           PLAN:  1.   Re: Continued surveillance.  Base of tongue cancer, diagnosed in 2007  2.   Re: Heme status.  WBC 6.07, hemoglobin 13.6, " "hematocrit 41.3, MCV 99.3 and platelet 177.     3.   Re: Folate > 20, B12 at 999, reticulocyte 1.23, saturation 24% and ferritin 101.1.  4.   Re: CMP.  .1 ml /min.  5.   Re: TSH at 4.8 from 10.42.  6.   Re: Note from Dr. Card on 3/24/2025.  Patient not really using the G tube at all.  Patient continued to deteriorate slowly.  Recommend seeing dentist for gingivitis and caries.  Follow-up in 3 months. \"I saw the oral surgeon and gave me chlorhexidine.\"  7.  Patient will keep appointment with ENT for surveillance.  8.  Note from Shreya Huizar PA-C on 9/9/2024.  Seen for PEG tube placement.  PEG was placed on 9/19/2024.  9.  Continue care per primary care physician and the other specialists.  10.  Plan of care discussed with patient.  Understand expressed.  Patient agreeable to proceed.  11. Advance Care Planning   ACP discussion was declined by the patient. Patient has an advance directive in EMR which is still valid.    12.  The patient will be seen every 12 months.  13.  Sable for observation, base of tongue cancer.   14.  Return to office in 12 months with CBC with differential, CMP and TSH.         I have reviewed the assessment and plan and verified the accuracy of it. No changes to assessment and plan since the information was documented. Chandler aGllardo MD 05/06/25         I spent 31 total minutes, face-to-face, caring for Fortino arenas. Greater than 50% of this time involved counseling and/or coordination of care as documented within this note.         (Alli Card MD)   (Channing Mcmanus MD)  (JONATHAN Reynolds)  (JONATHAN Schmidt)    "

## 2025-04-29 ENCOUNTER — HOSPITAL ENCOUNTER (OUTPATIENT)
Dept: PHYSICAL THERAPY | Facility: HOSPITAL | Age: 75
Setting detail: THERAPIES SERIES
Discharge: HOME | End: 2025-04-29
Payer: MEDICARE

## 2025-04-29 DIAGNOSIS — Z85.819 HISTORY OF PHARYNGEAL CANCER: ICD-10-CM

## 2025-04-29 DIAGNOSIS — I89.0 LYMPHEDEMA: Primary | ICD-10-CM

## 2025-04-29 PROCEDURE — 97140 MANUAL THERAPY 1/> REGIONS: CPT

## 2025-04-29 NOTE — THERAPY TREATMENT NOTE
Physical Therapy Treatment Note, 30 Day Progress Note, and 90 Day Recertification Note  [address] [place] [address]    Patient: Fortino Dennis                                                 Visit Date: 2025  :     1950    Referring practitioner:    Alli Card Jr*  Date of Initial Visit:          Type: THERAPY  Episode: lymphedema        Visit Diagnoses:    ICD-10-CM ICD-9-CM   1. Lymphedema  I89.0 457.1   2. History of pharyngeal cancer  Z85.819 V10.02       SUBJECTIVE     Subjective:  He has been wearing the compression garments. He has the form for the assistance for the Flexitouch pump.    PAIN: 0/10         OBJECTIVE     Objective    Lymphedema       Row Name 25 1100             Subjective Pain    Able to rate subjective pain? yes  -AL      Pre-Treatment Pain Level 0  -AL      Post-Treatment Pain Level 0  -AL         Manual Lymphatic Drainage    Manual Lymphatic Drainage initial sequence;head/neck treatment;opened regional lymph nodes  -AL      Initial Sequence full neck;abdomen;diaphragmatic breathing  -AL      Abdomen superficial  -AL      Diaphragmatic Breathing x 9 with abdomen  -AL      Opened Regional Lymph Nodes axillary  -AL      Axillary right;left  -AL      Head/Neck Treatment pre-auricular nodes;post-auricular nodes;occipital nodes;sublingual nodes;full/complex MLD  -AL      Manual Lymphatic Drainage Comments Used the hand held massager over the anterior neck.  -AL      Manual Therapy 40  -AL         Compression/Skin Care    Compression/Skin Care compression garment  -AL      Compression/Skin Care Comments Wear compression daily with pit pack.  I did remove the silicone scar tape before treatment and then placed it back on the scars anterior neck after treatment.  -AL                User Key  (r) = Recorded By, (t) = Taken By, (c) = Cosigned By      Initials Name Provider Type    AL Pastora Hamlin  P, PTA, CLT-JULIA Physical Therapist Assistant                         Therapy Education/Self Care [id number]   Education offered today Wear the compression garment with the pit pack.  [facility]   [facility] Code    Ongoing HEP   Cont self massage and stretches as well as MLD   Timed Minutes        Total Timed Treatment:     40   mins  Total Time of Visit:             40   mins         ASSESSMENT/PLAN     GOALS  Goals                                          Progress Note due by 5/29/25                                                      Recert due by 7/28/2025   STG by: 4 weeks Comments Date Status   Patient will have a good basic understanding of lymphedema and its suggested risk reduction practices Continue to educate each treatment.  He has a good understanding. 10/23 Met/Ongoing   Patient will be independent with remedial HEP for lymphedema He needs to work more on stretching at home. 4/29/25 Partially met   Patient will be independent with self MLD for lymphedema He works on IASTM with the hand held massager, he will do follow up MLD. 10/23 Met             LTG by: 8 Weeks         Patient will have appropriate compression garments for lymphedema maintenance He now has a compression garment he wears daily at home. 4/29/2025 Met   Patient will have no sign or symptoms of infection  No open areas no weeping 9/4 Met   Patient will be independent with comprehensive maintenance program for lymphedema Patient may have to [facility] to get the Flexitouch pump 4/29/2025 Ongoing                                         Assessment/Plan     ASSESSMENT: Scar tape is helping to soften the scars anterior neck, patient is compliant with wearing the silicone scar tape.  Patient now has a compression garment for the neck swelling, he is wearing this daily as well as using the cherry pit packs.  Today we did talk to the [facility] medical rep about the Flexitouch garment, patient did bring one of the forms back to  send into [facility] but he needs more income forms to send as well.  Patient may need to pursue the Flexitouch pump through the [facility].  Patient has been more compliant with his home program and using the compression garment the past couple of weeks.      PLAN:   Will see patient 1 x a week x 4 weeks for MLD.        SIGNATURE: Pastora Hamlin PTA, [facility] [place] License #: [id number]  Electronically Signed on 4/29/2025        [address] [place] [address]. [phone]  [phone]

## 2025-04-30 DIAGNOSIS — C10.2 MALIGNANT NEOPLASM OF LATERAL WALL OF OROPHARYNX: Primary | ICD-10-CM

## 2025-04-30 DIAGNOSIS — Z79.899 ENCOUNTER FOR LONG-TERM (CURRENT) USE OF MEDICATIONS: ICD-10-CM

## 2025-05-01 ENCOUNTER — APPOINTMENT (OUTPATIENT)
Dept: PHYSICAL THERAPY | Facility: HOSPITAL | Age: 75
End: 2025-05-01
Payer: MEDICARE

## 2025-05-03 DIAGNOSIS — G47.00 INSOMNIA, UNSPECIFIED TYPE: ICD-10-CM

## 2025-05-05 DIAGNOSIS — G47.00 INSOMNIA, UNSPECIFIED TYPE: ICD-10-CM

## 2025-05-05 RX ORDER — ZOLPIDEM TARTRATE 10 MG/1
10 TABLET ORAL NIGHTLY PRN
Qty: 30 TABLET | Refills: 2 | Status: SHIPPED | OUTPATIENT
Start: 2025-05-05

## 2025-05-05 RX ORDER — ZOLPIDEM TARTRATE 10 MG/1
10 TABLET ORAL NIGHTLY PRN
Qty: 30 TABLET | Refills: 2 | OUTPATIENT
Start: 2025-05-05

## 2025-05-05 NOTE — TELEPHONE ENCOUNTER
Caller:     Fortino Dennis       Relationship: SELF     Best call back number:     [phone]        Requested Prescriptions:     zolpidem (AMBIEN) 10 MG tablet  10 mg, Nightly PRN          Pharmacy where request should be sent:    [id number] - [place], [place] - [address]  - [phone]  - [phone]  [phone]       Last office visit with prescribing clinician: 11/7/2024   Last telemedicine visit with prescribing clinician: Visit date not found   Next office visit with prescribing clinician: 5/13/2025     Additional details provided by patient: REQUESTING CALL BACK     Does the patient have less than a 3 day supply:  [x] Yes  [] No    Would you like a call back once the refill request has been completed: [x] Yes [] No    If the office needs to give you a call back, can they leave a voicemail: [x] Yes [] No    Gamaliel Knight Rep   05/05/25 09:44 CDT

## 2025-05-05 NOTE — TELEPHONE ENCOUNTER
Rx Refill Note  Requested Prescriptions     Pending Prescriptions Disp Refills    zolpidem (AMBIEN) 10 MG tablet [Pharmacy Med Name: ZOLPIDEM 10MG TABLETS] 30 tablet      Sig: TAKE 1 TABLET BY MOUTH AT NIGHT AS NEEDED FOR SLEEP      Last office visit with prescribing clinician: 11/7/2024   Last telemedicine visit with prescribing clinician: Visit date not found   Next office visit with prescribing clinician: 5/5/2025                         Would you like a call back once the refill request has been completed: [] Yes [] No    If the office needs to give you a call back, can they leave a voicemail: [] Yes [] No    Lenny Schwarz MA  05/05/25, 11:19 CDT

## 2025-05-06 ENCOUNTER — RESULTS FOLLOW-UP (OUTPATIENT)
Dept: LAB | Facility: HOSPITAL | Age: 75
End: 2025-05-06
Payer: MEDICARE

## 2025-05-06 ENCOUNTER — LAB (OUTPATIENT)
Dept: LAB | Facility: HOSPITAL | Age: 75
End: 2025-05-06
Payer: MEDICARE

## 2025-05-06 ENCOUNTER — APPOINTMENT (OUTPATIENT)
Dept: PHYSICAL THERAPY | Facility: HOSPITAL | Age: 75
End: 2025-05-06
Payer: MEDICARE

## 2025-05-06 ENCOUNTER — OFFICE VISIT (OUTPATIENT)
Dept: ONCOLOGY | Facility: CLINIC | Age: 75
End: 2025-05-06
Payer: MEDICARE

## 2025-05-06 VITALS
DIASTOLIC BLOOD PRESSURE: 70 MMHG | RESPIRATION RATE: 16 BRPM | TEMPERATURE: 96.9 F | OXYGEN SATURATION: 98 % | BODY MASS INDEX: 18.06 KG/M2 | HEIGHT: 73 IN | HEART RATE: 86 BPM | WEIGHT: 136.3 LBS | SYSTOLIC BLOOD PRESSURE: 128 MMHG

## 2025-05-06 DIAGNOSIS — Z79.899 ENCOUNTER FOR LONG-TERM (CURRENT) USE OF MEDICATIONS: ICD-10-CM

## 2025-05-06 DIAGNOSIS — C10.2 MALIGNANT NEOPLASM OF LATERAL WALL OF OROPHARYNX: ICD-10-CM

## 2025-05-06 DIAGNOSIS — Z85.819 HISTORY OF PHARYNGEAL CANCER: Primary | ICD-10-CM

## 2025-05-06 LAB
ALBUMIN SERPL-MCNC: 4.2 G/DL (ref 3.5–5.2)
ALBUMIN/GLOB SERPL: 1.4 G/DL
ALP SERPL-CCNC: 103 U/L (ref 39–117)
ALT SERPL W P-5'-P-CCNC: 21 U/L (ref 1–41)
ANION GAP SERPL CALCULATED.3IONS-SCNC: 10 MMOL/L (ref 5–15)
AST SERPL-CCNC: 26 U/L (ref 1–40)
BASOPHILS # BLD AUTO: 0.04 10*3/MM3 (ref 0–0.2)
BASOPHILS NFR BLD AUTO: 0.7 % (ref 0–1.5)
BILIRUB SERPL-MCNC: 0.9 MG/DL (ref 0–1.2)
BUN SERPL-MCNC: 8 MG/DL (ref 8–23)
BUN/CREAT SERPL: 20 (ref 7–25)
CALCIUM SPEC-SCNC: 9.3 MG/DL (ref 8.6–10.5)
CHLORIDE SERPL-SCNC: 101 MMOL/L (ref 98–107)
CO2 SERPL-SCNC: 27 MMOL/L (ref 22–29)
CREAT SERPL-MCNC: 0.4 MG/DL (ref 0.76–1.27)
DEPRECATED RDW RBC AUTO: 46.7 FL (ref 37–54)
EGFRCR SERPLBLD CKD-EPI 2021: 113.1 ML/MIN/1.73
EOSINOPHIL # BLD AUTO: 0.21 10*3/MM3 (ref 0–0.4)
EOSINOPHIL NFR BLD AUTO: 3.5 % (ref 0.3–6.2)
ERYTHROCYTE [DISTWIDTH] IN BLOOD BY AUTOMATED COUNT: 12.8 % (ref 12.3–15.4)
GLOBULIN UR ELPH-MCNC: 2.9 GM/DL
GLUCOSE SERPL-MCNC: 78 MG/DL (ref 65–99)
HCT VFR BLD AUTO: 41.3 % (ref 37.5–51)
HGB BLD-MCNC: 13.6 G/DL (ref 13–17.7)
HOLD SPECIMEN: NORMAL
IMM GRANULOCYTES # BLD AUTO: 0.01 10*3/MM3 (ref 0–0.05)
IMM GRANULOCYTES NFR BLD AUTO: 0.2 % (ref 0–0.5)
LYMPHOCYTES # BLD AUTO: 0.95 10*3/MM3 (ref 0.7–3.1)
LYMPHOCYTES NFR BLD AUTO: 15.7 % (ref 19.6–45.3)
MCH RBC QN AUTO: 32.7 PG (ref 26.6–33)
MCHC RBC AUTO-ENTMCNC: 32.9 G/DL (ref 31.5–35.7)
MCV RBC AUTO: 99.3 FL (ref 79–97)
MONOCYTES # BLD AUTO: 0.65 10*3/MM3 (ref 0.1–0.9)
MONOCYTES NFR BLD AUTO: 10.7 % (ref 5–12)
NEUTROPHILS NFR BLD AUTO: 4.21 10*3/MM3 (ref 1.7–7)
NEUTROPHILS NFR BLD AUTO: 69.2 % (ref 42.7–76)
NRBC BLD AUTO-RTO: 0 /100 WBC (ref 0–0.2)
PLATELET # BLD AUTO: 177 10*3/MM3 (ref 140–450)
PMV BLD AUTO: 8.1 FL (ref 6–12)
POTASSIUM SERPL-SCNC: 4 MMOL/L (ref 3.5–5.2)
PROT SERPL-MCNC: 7.1 G/DL (ref 6–8.5)
RBC # BLD AUTO: 4.16 10*6/MM3 (ref 4.14–5.8)
SODIUM SERPL-SCNC: 138 MMOL/L (ref 136–145)
TSH SERPL DL<=0.05 MIU/L-ACNC: 4.88 UIU/ML (ref 0.27–4.2)
WBC NRBC COR # BLD AUTO: 6.07 10*3/MM3 (ref 3.4–10.8)

## 2025-05-06 PROCEDURE — 36415 COLL VENOUS BLD VENIPUNCTURE: CPT

## 2025-05-06 PROCEDURE — 80053 COMPREHEN METABOLIC PANEL: CPT

## 2025-05-06 PROCEDURE — 84443 ASSAY THYROID STIM HORMONE: CPT

## 2025-05-06 PROCEDURE — 85025 COMPLETE CBC W/AUTO DIFF WBC: CPT

## 2025-05-06 PROCEDURE — 1160F RVW MEDS BY RX/DR IN RCRD: CPT | Performed by: INTERNAL MEDICINE

## 2025-05-06 PROCEDURE — 3074F SYST BP LT 130 MM HG: CPT | Performed by: INTERNAL MEDICINE

## 2025-05-06 PROCEDURE — 1126F AMNT PAIN NOTED NONE PRSNT: CPT | Performed by: INTERNAL MEDICINE

## 2025-05-06 PROCEDURE — 99214 OFFICE O/P EST MOD 30 MIN: CPT | Performed by: INTERNAL MEDICINE

## 2025-05-06 PROCEDURE — G2211 COMPLEX E/M VISIT ADD ON: HCPCS | Performed by: INTERNAL MEDICINE

## 2025-05-06 PROCEDURE — 1159F MED LIST DOCD IN RCRD: CPT | Performed by: INTERNAL MEDICINE

## 2025-05-06 PROCEDURE — 3078F DIAST BP <80 MM HG: CPT | Performed by: INTERNAL MEDICINE

## 2025-05-06 RX ORDER — CHLORHEXIDINE GLUCONATE ORAL RINSE 1.2 MG/ML
SOLUTION DENTAL
COMMUNITY
Start: 2025-04-25

## 2025-05-08 ENCOUNTER — HOSPITAL ENCOUNTER (OUTPATIENT)
Dept: PHYSICAL THERAPY | Facility: HOSPITAL | Age: 75
Setting detail: THERAPIES SERIES
Discharge: HOME | End: 2025-05-08
Payer: MEDICARE

## 2025-05-08 DIAGNOSIS — Z85.819 HISTORY OF PHARYNGEAL CANCER: ICD-10-CM

## 2025-05-08 DIAGNOSIS — I89.0 LYMPHEDEMA: Primary | ICD-10-CM

## 2025-05-08 DIAGNOSIS — E03.9 ACQUIRED HYPOTHYROIDISM: ICD-10-CM

## 2025-05-08 PROCEDURE — 97140 MANUAL THERAPY 1/> REGIONS: CPT

## 2025-05-08 PROCEDURE — 97110 THERAPEUTIC EXERCISES: CPT

## 2025-05-08 RX ORDER — LEVOTHYROXINE SODIUM 88 UG/1
88 TABLET ORAL
Qty: 90 TABLET | Refills: 1 | Status: SHIPPED | OUTPATIENT
Start: 2025-05-08

## 2025-05-08 NOTE — THERAPY TREATMENT NOTE
Physical Therapy Treatment Note  [address] [place] [address]    Patient: Fortino Dennis                                                 Visit Date: 2025  :     1950    Referring practitioner:    Alli Card Jr*  Date of Initial Visit:          Type: THERAPY  Episode: lymphedema        Visit Diagnoses:    ICD-10-CM ICD-9-CM   1. Lymphedema  I89.0 457.1   2. History of pharyngeal cancer  Z85.819 V10.02       SUBJECTIVE     Subjective: He has been wearing the compression garment and the pit pack.  He is off so been wearing the silicone tape over the scar.    PAIN: 0/10         OBJECTIVE     Objective    Lymphedema       Row Name 25 1100             Subjective Pain    Able to rate subjective pain? yes  -AL      Pre-Treatment Pain Level 0  -AL      Post-Treatment Pain Level 0  -AL         Manual Lymphatic Drainage    Manual Lymphatic Drainage initial sequence;head/neck treatment;opened regional lymph nodes  -AL      Initial Sequence full neck;abdomen;diaphragmatic breathing  -AL      Abdomen superficial  -AL      Diaphragmatic Breathing x 9 with abdomen  -AL      Opened Regional Lymph Nodes axillary  -AL      Axillary right;left  -AL      Head/Neck Treatment pre-auricular nodes;post-auricular nodes;occipital nodes;sublingual nodes;full/complex MLD  -AL      Manual Lymphatic Drainage Comments Used the hand held massager over the anterior neck.  -AL      Manual Therapy 45  -AL         Compression/Skin Care    Compression/Skin Care compression garment  -AL      Compression/Skin Care Comments Wear the compression with the pit pack.  Wear the silicone tape over the incision.  -AL                User Key  (r) = Recorded By, (t) = Taken By, (c) = Cosigned By      Initials Name Provider Type    AL Pastora Hamlin PTA, CLT-JULIA Physical Therapist Assistant                       Therapeutic Exercises    65928 Units Comments   In  supine with head elevated stretch the neck into cervical rotation and also cervical lateral flexion to each side of the neck                         Timed Minutes 10          Therapy Education/Self Care 54179   Education offered today Wear the compression garment with the pit pack.  [facility]   Med[facility] Code    Ongoing HEP   Cont self massage and stretches as well as MLD   Timed Minutes        Total Timed Treatment:     55   mins  Total Time of Visit:             55   mins         ASSESSMENT/PLAN     GOALS  Goals                                          Progress Note due by 5/29/25                                                      Recert due by 7/28/2025   STG by: 4 weeks Comments Date Status   Patient will have a good basic understanding of lymphedema and its suggested risk reduction practices Continue to educate each treatment.  He has a good understanding. 10/23 Met/Ongoing   Patient will be independent with remedial HEP for lymphedema He is working on stretching at home. 5/8/25 Partially met   Patient will be independent with self MLD for lymphedema He works on IASTM with the hand held massager, he will do follow up MLD. 10/23 Met             LTG by: 8 Weeks         Patient will have appropriate compression garments for lymphedema maintenance He now has a compression garment he wears daily at home. 4/29/2025 Met   Patient will have no sign or symptoms of infection  No open areas no weeping 9/4 Met   Patient will be independent with comprehensive maintenance program for lymphedema Patient [facility] 4/29/2025 Ongoing                                         Assessment/Plan     ASSESSMENT: Patient does have a very small open area from shaving today, it is along the scar anterior neck.  The dense tissue does soften quicker today, I do believe that he scarred strip is helping with softening the scar and making the scars more mobile.  Patient will  still need [facility] so he can inquire about a Flexitouch pump.      PLAN:   Will see patient 1 x a week x 4 weeks for MLD.        SIGNATURE: Pastora Hamlin PTA, LANG-JULIABRIAN MEJIA License #: [id number]  Electronically Signed on 5/8/2025        [address] [place] [address]  [phone]

## 2025-05-13 ENCOUNTER — OFFICE VISIT (OUTPATIENT)
Dept: INTERNAL MEDICINE | Facility: CLINIC | Age: 75
End: 2025-05-13
Payer: MEDICARE

## 2025-05-13 ENCOUNTER — TELEPHONE (OUTPATIENT)
Dept: SURGERY | Facility: CLINIC | Age: 75
End: 2025-05-13
Payer: MEDICARE

## 2025-05-13 VITALS
OXYGEN SATURATION: 97 % | DIASTOLIC BLOOD PRESSURE: 80 MMHG | HEART RATE: 85 BPM | WEIGHT: 139.4 LBS | HEIGHT: 73 IN | SYSTOLIC BLOOD PRESSURE: 133 MMHG | RESPIRATION RATE: 16 BRPM | BODY MASS INDEX: 18.47 KG/M2

## 2025-05-13 DIAGNOSIS — Z43.1 ENCOUNTER FOR PEG (PERCUTANEOUS ENDOSCOPIC GASTROSTOMY): ICD-10-CM

## 2025-05-13 DIAGNOSIS — I48.91 NEW ONSET ATRIAL FIBRILLATION: ICD-10-CM

## 2025-05-13 DIAGNOSIS — J31.0 GUSTATORY RHINITIS: Primary | ICD-10-CM

## 2025-05-13 DIAGNOSIS — E03.9 ACQUIRED HYPOTHYROIDISM: ICD-10-CM

## 2025-05-13 DIAGNOSIS — I10 PRIMARY HYPERTENSION: ICD-10-CM

## 2025-05-13 DIAGNOSIS — G31.84 MILD COGNITIVE IMPAIRMENT: ICD-10-CM

## 2025-05-13 RX ORDER — IPRATROPIUM BROMIDE 21 UG/1
2 SPRAY, METERED NASAL
Qty: 30 ML | Refills: 1 | Status: SHIPPED | OUTPATIENT
Start: 2025-05-13

## 2025-05-13 RX ORDER — DONEPEZIL HYDROCHLORIDE 5 MG/1
5 TABLET, FILM COATED ORAL NIGHTLY
Qty: 90 TABLET | Refills: 1 | Status: SHIPPED | OUTPATIENT
Start: 2025-05-13

## 2025-05-13 NOTE — TELEPHONE ENCOUNTER
Attempted to contact PT regarding scheduling for their referral. I left them a voicemail with our office number and requested they call us back at their earliest convenience to get scheduled.     Spoke with pts brother Geovanny again regarding scheduling for this referral. Pt's brother stated to call him back at 4pm so he can write our phone number down to give to the pt.      ECC  5/13/2025

## 2025-05-14 ENCOUNTER — APPOINTMENT (OUTPATIENT)
Dept: PHYSICAL THERAPY | Facility: HOSPITAL | Age: 75
End: 2025-05-14
Payer: MEDICARE

## 2025-05-14 NOTE — PROGRESS NOTES
CC: f/u atrial fibrillation & PEG    History:  Fortino Dennis is a 74 y.o. male   History of Present Illness  The patient came in today to discuss his atrial fibrillation, thyroid issues, runny nose, and feeding tube management.    He mentioned that his heart still races from time to time due to atrial fibrillation, but he hasn't had any chest pain or trouble breathing.    His thyroid medication dosage was recently changed, and he's waiting for the refill.    He finds his runny nose very bothersome, especially when eating.    He is scheduled to get his feeding tube replaced and prefers the larger tube he had before, which he found easier to manage. Currently, he mostly eats cereal, milk, bananas, and bread, and occasionally prepares breakfast with gravy, blending it for easier consumption. He hasn't recently followed up with Dr. Pearce, who inserted the feeding tube at [facility].        ROS:  Review of Systems   Respiratory:  Negative for shortness of breath.    Cardiovascular:  Negative for chest pain.        reports that he has never smoked. He has never been exposed to tobacco smoke. He has never used smokeless tobacco. He reports current alcohol use of about 3.0 standard drinks of alcohol per week. He reports that he does not use drugs.      Current Outpatient Medications:     acetaminophen (Tylenol) 325 MG tablet, Take 3 tablets by mouth Every 8 (Eight) Hours. Take every 8 hours for 3 days then take prn as needed., Disp: , Rfl:     chlorhexidine (PERIDEX) 0.12 % solution, RINSE MOUTH WITH 5-10 ML FOR 1 MINUTE AFTER BRUSHING AND SPIT OUT ONCE DAILY, Disp: , Rfl:     donepezil (ARICEPT) 5 MG tablet, Take 1 tablet by mouth Every Night., Disp: 90 tablet, Rfl: 1    levothyroxine (Synthroid) 88 MCG tablet, Take 1 tablet by mouth Every Morning., Disp: 90 tablet, Rfl: 1    Restasis 0.05 % ophthalmic emulsion, Administer 1 drop to both eyes 2 (Two) Times a Day., Disp: , Rfl:     rivaroxaban (XARELTO) 20 MG  "tablet, Take 1 tablet by mouth Daily., Disp: 90 tablet, Rfl: 1    SENNA PO, Take 1 capsule by mouth Daily., Disp: , Rfl:     venlafaxine XR (Effexor XR) 150 MG 24 hr capsule, Take 1 capsule by mouth Daily., Disp: 90 capsule, Rfl: 1    zolpidem (AMBIEN) 10 MG tablet, Take 1 tablet by mouth At Night As Needed for Sleep., Disp: 30 tablet, Rfl: 2    ipratropium (ATROVENT) 0.03 % nasal spray, Administer 2 sprays into the nostril(s) as directed by provider 3 (Three) Times a Day With Meals., Disp: 30 mL, Rfl: 1    OBJECTIVE:  /80 (BP Location: Left arm, Patient Position: Sitting, Cuff Size: Adult)   Pulse 85   Resp 16   Ht 185.4 cm (73\")   Wt 63.2 kg (139 lb 6.4 oz)   SpO2 97%   BMI 18.39 kg/m²    Physical Exam  Constitutional:       General: He is not in acute distress.  Pulmonary:      Effort: Pulmonary effort is normal. No respiratory distress.   Neurological:      Mental Status: He is alert and oriented to person, place, and time.   Psychiatric:         Mood and Affect: Mood normal.         Behavior: Behavior normal.           Assessment/Plan     Diagnoses and all orders for this visit:    1. Gustatory rhinitis (Primary)  -     ipratropium (ATROVENT) 0.03 % nasal spray; Administer 2 sprays into the nostril(s) as directed by provider 3 (Three) Times a Day With Meals.  Dispense: 30 mL; Refill: 1  Atrovent to assist with symptoms.     2. New onset atrial fibrillation  Stable on Xarelto and auto-rate controlled without further prolonged symptoms.     3. Mild cognitive impairment  -     donepezil (ARICEPT) 5 MG tablet; Take 1 tablet by mouth Every Night.  Dispense: 90 tablet; Refill: 1  No changes or decompensation.     4. Acquired hypothyroidism  -     TSH; Future  Check TSH with next labs given recent dose change.     5. Primary hypertension  Well controlled, BP goal for age is <140/90 per JNC 8 guidelines, and continue current medications    6. Encounter for PEG (percutaneous endoscopic gastrostomy)  -     " Ambulatory Referral to General Surgery  Referral back to Dr. Pearce to discuss potential alternative device that was easier for him to handle. He is using his PEG very little and has maintained his weight.     An After Visit Summary was printed and given to the patient at discharge.  Return in about 4 months (around 9/13/2025) for Recheck.      Patient or patient representative verbalized consent for the use of Ambient Listening during the visit with  Fortino Sierra DO for chart documentation. 5/14/2025  10:14 CDT    Fortino Sierra D.O. 5/14/2025   Electronically signed.

## 2025-05-15 ENCOUNTER — HOSPITAL ENCOUNTER (OUTPATIENT)
Dept: PHYSICAL THERAPY | Facility: HOSPITAL | Age: 75
Setting detail: THERAPIES SERIES
Discharge: HOME | End: 2025-05-15
Payer: MEDICARE

## 2025-05-15 DIAGNOSIS — Z85.819 HISTORY OF PHARYNGEAL CANCER: ICD-10-CM

## 2025-05-15 DIAGNOSIS — I89.0 LYMPHEDEMA: Primary | ICD-10-CM

## 2025-05-15 PROCEDURE — 97110 THERAPEUTIC EXERCISES: CPT

## 2025-05-15 PROCEDURE — 97140 MANUAL THERAPY 1/> REGIONS: CPT

## 2025-05-15 NOTE — THERAPY TREATMENT NOTE
Physical Therapy Treatment Note  [address] [place] [address]    Patient: Fortino Dennis                                                 Visit Date: 5/15/2025  :     1950    Referring practitioner:    Alli Card Jr*  Date of Initial Visit:          Type: THERAPY  Episode: lymphedema        Visit Diagnoses:    ICD-10-CM ICD-9-CM   1. Lymphedema  I89.0 457.1   2. History of pharyngeal cancer  Z85.819 V10.02       SUBJECTIVE     Subjective: He is wearing the compression and the scar strip.  [facility] is [facility].    PAIN: 0/10         OBJECTIVE     Objective    Lymphedema       Row Name 05/15/25 1100             Subjective Pain    Able to rate subjective pain? yes  -AL      Pre-Treatment Pain Level 0  -AL      Post-Treatment Pain Level 0  -AL         Manual Lymphatic Drainage    Manual Lymphatic Drainage initial sequence;head/neck treatment;opened regional lymph nodes  -AL      Initial Sequence full neck;abdomen;diaphragmatic breathing  -AL      Abdomen superficial  -AL      Diaphragmatic Breathing x 9 with abdomen  -AL      Opened Regional Lymph Nodes axillary  -AL      Axillary right;left  -AL      Head/Neck Treatment pre-auricular nodes;post-auricular nodes;occipital nodes;sublingual nodes;full/complex MLD  -AL      Manual Lymphatic Drainage Comments Hand held massager to the anterior neck  -AL      Manual Therapy 45  -AL         Compression/Skin Care    Compression/Skin Care compression garment  -AL      Compression/Skin Care Comments Wear compression  -AL                User Key  (r) = Recorded By, (t) = Taken By, (c) = Cosigned By      Initials Name Provider Type    AL Pastora Hamlin, PTA, CLT-JULIA Physical Therapist Assistant                         Therapeutic Exercises    24314 Units Comments   In supine with head elevated stretch the neck into cervical rotation and also cervical  lateral flexion to each side of the neck.  Gentle cervical extension stretch.     UBE L 1  5 min 2 min forward, 1 min backwards, one rest                  Timed Minutes 10          Therapy Education/Self Care 82169   Education offered today Wear the compression garment with the pit pack.  [facility]   Medbridge Code    Ongoing HEP   Cont self massage and stretches as well as MLD   Timed Minutes        Total Timed Treatment:     55   mins  Total Time of Visit:             55   mins         ASSESSMENT/PLAN     GOALS  Goals                                          Progress Note due by 5/29/25                                                      Recert due by 7/28/2025   STG by: 4 weeks Comments Date Status   Patient will have a good basic understanding of lymphedema and its suggested risk reduction practices Continue to educate each treatment.  He has a good understanding. 10/23 Met/Ongoing   Patient will be independent with remedial HEP for lymphedema He continues to work on stretching at home. 5/15/25 Partially met   Patient will be independent with self MLD for lymphedema He works on IASTM with the hand held massager, he will do follow up MLD. 10/23 Met             LTG by: 8 Weeks         Patient will have appropriate compression garments for lymphedema maintenance He now has a compression garment he wears daily at home. 4/29/2025 Met   Patient will have no sign or symptoms of infection  No open areas no weeping 9/4 Met   Patient will be independent with comprehensive maintenance program for lymphedema Patient [facility] 4/29/2025 Ongoing                                         Assessment/Plan     ASSESSMENT: When patient was using the UBE today he fatigued very easily, we will add this to his treatment each time he sees us to increase his endurance.  I was able to move more fluid on the right side of the neck, he does have an area of more dense tissue  in the center of the neck above his Sarabjit's apple.  Patient is compliant with wearing his compression garment.    PLAN:   Will see patient 1 x a week x 4 weeks for MLD.        SIGNATURE: Pastora Hamlin PTA, LANGBRIAN DUMONT License #: [id number]  Electronically Signed on 5/15/2025        [address] [place] [address]. [address]  [phone]

## 2025-05-19 ENCOUNTER — OFFICE VISIT (OUTPATIENT)
Dept: SURGERY | Facility: CLINIC | Age: 75
End: 2025-05-19
Payer: MEDICARE

## 2025-05-19 VITALS
BODY MASS INDEX: 18.21 KG/M2 | WEIGHT: 137.4 LBS | SYSTOLIC BLOOD PRESSURE: 118 MMHG | HEIGHT: 73 IN | OXYGEN SATURATION: 98 % | DIASTOLIC BLOOD PRESSURE: 70 MMHG | HEART RATE: 89 BPM

## 2025-05-19 DIAGNOSIS — Z43.1 PEG (PERCUTANEOUS ENDOSCOPIC GASTROSTOMY) ADJUSTMENT/REPLACEMENT/REMOVAL: Primary | ICD-10-CM

## 2025-05-19 NOTE — PROGRESS NOTES
Office Established Patient Note:     Referring Provider: Fortino Sierra DO    Chief Complaint   Patient presents with    PEG Tube     Patient would like to change tube to something bigger due to it being easier to deal with       Subjective     History of Present Illness  The patient presents for evaluation of feeding tube.    He reports a history of utilizing a larger syringe for his feeding tube, which he found more efficient in administering the entire bottle of feed. However, he is currently experiencing difficulty with the smaller syringe, as it necessitates multiple refills to deliver the same volume of feed. He expresses a preference for the larger syringe due to its convenience in delivering the full bottle of feed in one go.    He recalls an initial period of adjustment to the feeding tube, during which he had to relearn the swallowing process. He is keen to avoid a similar situation.       Review of Systems    Review of Systems - General ROS: negative  ENT ROS: negative  Respiratory ROS: no cough, shortness of breath, or wheezing  Cardiovascular ROS: no chest pain or dyspnea on exertion  Gastrointestinal ROS: positive for - PEG tube in place   Genito-Urinary ROS: no dysuria, trouble voiding, or hematuria  Dermatological ROS: negative   Breast ROS: negative for breast lumps  Hematological and Lymphatic ROS: negative  Musculoskeletal ROS: negative   Neurological ROS: no TIA or stroke symptoms    Psychological ROS: negative  Endocrine ROS: negative    History  Past Medical History:   Diagnosis Date    Acquired hypothyroidism     Anxiety     Arthritis     Atrial fibrillation     Dental disease     Depression     Essential hypertension     GERD (gastroesophageal reflux disease)     Headache 2010 first noticed    Hiatal hernia     Hyperlipidemia     Hypertension     Memory problem     aricept    Mixed hyperlipidemia     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    Sleep apnea     Throat cancer     Thyroid  disorder     Tinnitus 2020    TMJ dysfunction 2020 wear    ,   Past Surgical History:   Procedure Laterality Date    CATARACT EXTRACTION WITH INTRAOCULAR LENS IMPLANT Bilateral     pt states x3 surgeries    ENDOSCOPY N/A 9/19/2024    Procedure: ESOPHAGOGASTRODUODENOSCOPY WITH PERCUTANEOUS ENDOSCOPIC GASTROSTOMY TUBE INSERTION WITH ANESTHESIA;  Surgeon: Anny Pearce MD;  Location: [facility] ENDOSCOPY;  Service: General;  Laterality: N/A;  Pre: Peg tube insertion;  Post: Peg tube inserted;  Fortino Sierra, DO    GTUBE INSERTION      LARYNGOSCOPY Bilateral 07/07/2023    Procedure: MICRODIRECT LARYNGOSCOPY;  Surgeon: Alli Card Jr., MD;  Location: [facility] OR;  Service: ENT;  Laterality: Bilateral;    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    MANDIBLE SURGERY  2021    removal of necrotic jaw (part of 1 of 2 surgeries)    MASTECTOMY Left     removal of breast due to benign growth    PANENDOSCOPY Bilateral 07/07/2023    Procedure: DIRECT LARYNGOSCOPY, ESOPHAGOSCOPY, BRONCHOSCOPY, MICRODIRECT LARYNGOSCOPY,OR ESOPHAGEAL DILATATION 38-48;  Surgeon: Alli Card Jr., MD;  Location: [facility] OR;  Service: ENT;  Laterality: Bilateral;    THROAT SURGERY  2007    cancerous mass removed   ,   Family History   Problem Relation Age of Onset    Cancer Mother     Hypertension Mother     Dementia Mother     Colon cancer Mother     Colon polyps Neg Hx    ,   Social History     Tobacco Use    Smoking status: Never     Passive exposure: Never    Smokeless tobacco: Never    Tobacco comments:     Zero -- none   Vaping Use    Vaping status: Never Used   Substance Use Topics    Alcohol use: Yes     Alcohol/week: 3.0 standard drinks of alcohol     Types: 3 Cans of beer per week     Comment: daily beers    Drug use: Never   , (Not in a hospital admission)   and Allergies:  Patient has no known allergies.    Current Outpatient Medications:     acetaminophen (Tylenol) 325 MG tablet, Take 3  "tablets by mouth Every 8 (Eight) Hours. Take every 8 hours for 3 days then take prn as needed., Disp: , Rfl:     chlorhexidine (PERIDEX) 0.12 % solution, RINSE MOUTH WITH 5-10 ML FOR 1 MINUTE AFTER BRUSHING AND SPIT OUT ONCE DAILY, Disp: , Rfl:     donepezil (ARICEPT) 5 MG tablet, Take 1 tablet by mouth Every Night., Disp: 90 tablet, Rfl: 1    ipratropium (ATROVENT) 0.03 % nasal spray, Administer 2 sprays into the nostril(s) as directed by provider 3 (Three) Times a Day With Meals., Disp: 30 mL, Rfl: 1    levothyroxine (Synthroid) 88 MCG tablet, Take 1 tablet by mouth Every Morning., Disp: 90 tablet, Rfl: 1    Restasis 0.05 % ophthalmic emulsion, Administer 1 drop to both eyes 2 (Two) Times a Day., Disp: , Rfl:     rivaroxaban (XARELTO) 20 MG tablet, Take 1 tablet by mouth Daily., Disp: 90 tablet, Rfl: 1    SENNA PO, Take 1 capsule by mouth Daily., Disp: , Rfl:     venlafaxine XR (Effexor XR) 150 MG 24 hr capsule, Take 1 capsule by mouth Daily., Disp: 90 capsule, Rfl: 1    zolpidem (AMBIEN) 10 MG tablet, Take 1 tablet by mouth At Night As Needed for Sleep., Disp: 30 tablet, Rfl: 2    Objective     Vital Signs   /70 (BP Location: Right arm, Patient Position: Sitting, Cuff Size: Adult)   Pulse 89   Ht 185.4 cm (72.99\")   Wt 62.3 kg (137 lb 6.4 oz)   SpO2 98%   BMI 18.13 kg/m²      Physical Exam:  General appearance - alert, well appearing, and in no distress  Mental status - normal mood, behavior, speech, dress, motor activity, and thought processes  Eyes - sclera anicteric  Neck - supple, no significant adenopathy  Chest - no tachypnea, retractions or cyanosis  Heart - normal rate and regular rhythm  Abdomen - PEG tube in place   Neurological - alert, oriented, normal speech, no focal findings or movement disorder noted  Skin - normal coloration and turgor, no rashes, no suspicious skin lesions noted    Physical Exam         Results Review:  Result Review :            Results         Assessment & Plan "     Diagnoses and all orders for this visit:    1. PEG (percutaneous endoscopic gastrostomy) adjustment/replacement/removal (Primary)         Assessment & Plan  1. Feeding tube management.  Experiencing difficulty with the current feeding tube due to the size of the syringe, making it challenging to administer the feed efficiently. Prefers a larger syringe to allow for quicker administration of a whole bottle. Will contact nutrition support here to determine if there is an adapter or larger syringe available to accommodate needs. If the supplier does not have a solution, other options will be explored, including contacting the company that provides feeding supplies. Risks, benefits, and alternatives of potential solutions were discussed, emphasizing the importance of maintaining proper feeding techniques to avoid complications such as relearning to swallow.     I was able to locate both an adapter and new syringe for the patient.  Nutrition support services was able to provide this said equipment for me.  I spent time teaching him how to use said adapter and syringe.  Patient voiced understanding of this.  At this time he will follow-up in office as needed.  He voices understanding and is agreeable to the plan.    I spent 30 minutes contacting nutrition services and allocating set equipment for the patient.    This is a single serious chronic diagnosis which requires coordination of complex care.      Follow up:     Return if symptoms worsen or fail to improve.        Shreya Huizar PA-C  05/19/25  16:44 CDT    Patient or patient representative verbalized consent for the use of Ambient Listening during the visit with  Shreya Huziar PA-C for chart documentation. 5/19/2025  16:40 CDT

## 2025-05-20 ENCOUNTER — APPOINTMENT (OUTPATIENT)
Dept: PHYSICAL THERAPY | Facility: HOSPITAL | Age: 75
End: 2025-05-20
Payer: MEDICARE

## 2025-05-22 ENCOUNTER — HOSPITAL ENCOUNTER (OUTPATIENT)
Dept: PHYSICAL THERAPY | Facility: HOSPITAL | Age: 75
Setting detail: THERAPIES SERIES
Discharge: HOME | End: 2025-05-22
Payer: MEDICARE

## 2025-05-22 DIAGNOSIS — Z85.819 HISTORY OF PHARYNGEAL CANCER: ICD-10-CM

## 2025-05-22 DIAGNOSIS — I89.0 LYMPHEDEMA: Primary | ICD-10-CM

## 2025-05-22 PROCEDURE — 97110 THERAPEUTIC EXERCISES: CPT

## 2025-05-22 PROCEDURE — 97140 MANUAL THERAPY 1/> REGIONS: CPT

## 2025-05-22 NOTE — THERAPY TREATMENT NOTE
Physical Therapy Treatment Note  [address] [place] [address]    Patient: Fortino Dennis                                                 Visit Date: 2025  :     1950    Referring practitioner:    Alli Card Jr*  Date of Initial Visit:          Type: THERAPY  Episode: lymphedema        Visit Diagnoses:    ICD-10-CM ICD-9-CM   1. Lymphedema  I89.0 457.1   2. History of pharyngeal cancer  Z85.819 V10.02       SUBJECTIVE     Subjective: He is wearing the compression, using the silicone scar strip, and using the hand held massager at home.  [facility]     PAIN: 0/10         OBJECTIVE     Objective    Lymphedema       Row Name 25 1100             Subjective Pain    Able to rate subjective pain? yes  -AL      Pre-Treatment Pain Level 0  -AL      Post-Treatment Pain Level 0  -AL         Manual Lymphatic Drainage    Manual Lymphatic Drainage initial sequence;head/neck treatment;opened regional lymph nodes  -AL      Initial Sequence full neck;abdomen;diaphragmatic breathing  -AL      Abdomen superficial  -AL      Diaphragmatic Breathing x 9 with abdomen  -AL      Opened Regional Lymph Nodes axillary  -AL      Axillary right;left  -AL      Head/Neck Treatment pre-auricular nodes;post-auricular nodes;occipital nodes;sublingual nodes;full/complex MLD  -AL      Manual Lymphatic Drainage Comments Hand held massager to the anterior neck  -AL      Manual Therapy 50  -AL         Compression/Skin Care    Compression/Skin Care compression garment  -AL      Compression/Skin Care Comments Wear compression  -AL                User Key  (r) = Recorded By, (t) = Taken By, (c) = Cosigned By      Initials Name Provider Type    AL Pastora Hamlin, PTA, CLT-JULIA Physical Therapist Assistant                           Therapeutic Exercises    76071 Units Comments   In supine with head elevated stretch the neck  into cervical rotation and also cervical lateral flexion to each side of the neck.       UBE L 0 4 min Alternating forward and backwards with 2 rest breaks.                  Timed Minutes 10          Therapy Education/Self Care 91409   Education offered today Wear the compression garment with the pit pack.  [facility]   Medbridge Code    Ongoing HEP   Cont self massage and stretches as well as MLD   Timed Minutes        Total Timed Treatment:     60   mins  Total Time of Visit:             60   mins         ASSESSMENT/PLAN     GOALS  Goals                                          Progress Note due by 5/29/25                                                      Recert due by 7/28/2025   STG by: 4 weeks Comments Date Status   Patient will have a good basic understanding of lymphedema and its suggested risk reduction practices Continue to educate each treatment.  He has a good understanding. 10/23 Met/Ongoing   Patient will be independent with remedial HEP for lymphedema He continues to work on stretching at home. 5/15/25 Partially met   Patient will be independent with self MLD for lymphedema He works on IASTM with the hand held massager, he will do follow up MLD. 10/23 Met             LTG by: 8 Weeks         Patient will have appropriate compression garments for lymphedema maintenance He now has a compression garment he wears daily at home. 4/29/2025 Met   Patient will have no sign or symptoms of infection Patient has a slight amount of weeping from the scar at the center of the anterior neck today. 5/22/2025 Ongoing   Patient will be independent with comprehensive maintenance program for lymphedema Patient may have to [facility] the [facility] to [facility]ouch pump, he has not been able to contact the [facility] yet. 5/22/25 Ongoing                                         Assessment/Plan     ASSESSMENT:Patient was able to tolerate using the UBE 1 minute longer today but needed two rest breaks.  Patient  continues to work on MLD using the hand-held massager, stretches, and he is using the silicone scar strip along his scars over the anterior neck.  The scars are moving better as compared to his last treatment.  He is not able to use his feeding tube because the attachment to the tube is too small for him to put the nutrition through.  Patient has not been able to contact the [facility] about the [facility] pump.  Patient does have a minimal amount of weeping at the center of the anterior neck where his scar incision is, the weeping does occur after he had shaved.    PLAN:   Will see patient 1 x a week x 4 weeks for MLD.        SIGNATURE: Pastora Hamlin PTA, BRIAN FARR License #: [id number]  Electronically Signed on 5/22/2025        [address] [place] [address] [place]  [place] [address]  [phone]

## 2025-05-27 ENCOUNTER — HOSPITAL ENCOUNTER (OUTPATIENT)
Dept: PHYSICAL THERAPY | Facility: HOSPITAL | Age: 75
Setting detail: THERAPIES SERIES
Discharge: HOME | End: 2025-05-27
Payer: MEDICARE

## 2025-05-27 DIAGNOSIS — Z85.819 HISTORY OF PHARYNGEAL CANCER: ICD-10-CM

## 2025-05-27 DIAGNOSIS — I89.0 LYMPHEDEMA: Primary | ICD-10-CM

## 2025-05-27 PROCEDURE — 97140 MANUAL THERAPY 1/> REGIONS: CPT

## 2025-05-27 NOTE — THERAPY TREATMENT NOTE
Physical Therapy Treatment Note and 30 Day Progress Note  [address] [place] [address]    Patient: Fortino Dennis                                                 Visit Date: 2025  :     1950    Referring practitioner:    Alli Card Jr*  Date of Initial Visit:          Type: THERAPY  Episode: lymphedema        Visit Diagnoses:    ICD-10-CM ICD-9-CM   1. Lymphedema  I89.0 457.1   2. History of pharyngeal cancer  Z85.819 V10.02       SUBJECTIVE     Subjective: He is wearing the compression, using the silicone scar strip, and using the hand held massager at home.  [facility] not [facility].     PAIN: 0/10         OBJECTIVE     Objective    Lymphedema       Row Name 25 1100             Subjective Pain    Able to rate subjective pain? yes  -AL      Pre-Treatment Pain Level 0  -AL      Post-Treatment Pain Level 0  -AL         Lymphedema Measurements    Measurement Type(s) Circumferential  -AL      Circumferential Areas Neck  -AL         Head Circumferential (cm)    Measurement Location 1 Upper lip  -AL      Head 1 46.2 cm  -AL      Measurement Location 2 Lower lip  -AL      Head 2 44.5 cm  -AL      Head Circumferential Total 90.7 cm  -AL         Neck Circumferential (cm)    Measurement Location 1 Sarabjit's apple  -AL      Neck 1 33 cm  -AL      Neck Circumferential Total 33 cm  -AL         Manual Lymphatic Drainage    Manual Lymphatic Drainage initial sequence;head/neck treatment;opened regional lymph nodes  -AL      Initial Sequence full neck;abdomen;diaphragmatic breathing  -AL      Abdomen superficial  -AL      Diaphragmatic Breathing x 9 with abdomen  -AL      Opened Regional Lymph Nodes axillary  -AL      Axillary right;left  -AL      Head/Neck Treatment pre-auricular nodes;post-auricular nodes;occipital nodes;sublingual nodes;full/complex MLD  -AL      Manual Lymphatic Drainage Comments Used the  hand-held massager over the anterior neck.  I did give patient new silicone scar strip to go over the scars anterior neck.  Stretch the neck into lateral flexion and rotation bilaterally  -AL      Manual Therapy 40  -AL         Compression/Skin Care    Compression/Skin Care compression garment  -AL      Compression/Skin Care Comments Wear compression  -AL                User Key  (r) = Recorded By, (t) = Taken By, (c) = Cosigned By      Initials Name Provider Type    Pastora Serrano PTA, CHIKA Physical Therapist Assistant                           Therapy Education/Self Care 82748   Education offered today Wear the compression garment with the pit pack.  [facility]   Medbridge Code    Ongoing HEP   Cont self massage and stretches as well as MLD   Timed Minutes        Total Timed Treatment:     40   mins  Total Time of Visit:             40   mins         ASSESSMENT/PLAN     GOALS  Goals                                          Progress Note due by 5/29/25                                                      Recert due by 7/28/2025   STG by: 4 weeks Comments Date Status   Patient will have a good basic understanding of lymphedema and its suggested risk reduction practices Continue to educate each treatment.  He has a good understanding. 10/23 Met/Ongoing   Patient will be independent with remedial HEP for lymphedema Patient is working on stretching at home 5/27/2025 Met   Patient will be independent with self MLD for lymphedema He works on IASTM with the hand held massager, he will do follow up MLD. 10/23 Met             LTG by: 8 Weeks         Patient will have appropriate compression garments for lymphedema maintenance He now has a compression garment he wears daily at home. 4/29/2025 Met   Patient will have no sign or symptoms of infection Patient has a slight amount of weeping from the scar at the center of the anterior neck today. 5/27/2025 Ongoing   Patient will be independent with  comprehensive maintenance program for lymphedema She needs to get in [facility] the [facility] about the [facility] pump 5/27/2025 Ongoing                                         Assessment/Plan     ASSESSMENT: Patient is working on the stretching for his neck, MLD, and wearing compression at home with the chip bag.  Patient is not going to be able to get the Flexitouch pump through Medicare, he needs to get set up with the [facility] again so he can try to get the Flexitouch pump.  We are going to see patient again in 1 month and that time he will work on getting set up with the [facility] and talking to them about the Flexitouch pump, as the Flexitouch pump is normally covered by the [facility].  PLAN:   Will see patient 1 time a month.      SIGNATURE: Pastora Hamlin PTA, LANG-BRIAN DUMONT License #: [id number]  Electronically Signed on 5/27/2025        Gillian Kate  [place] [address]  [phone]

## 2025-05-29 ENCOUNTER — APPOINTMENT (OUTPATIENT)
Dept: PHYSICAL THERAPY | Facility: HOSPITAL | Age: 75
End: 2025-05-29
Payer: MEDICARE

## 2025-05-30 NOTE — THERAPY TREATMENT NOTE
Progress Note Addendum    Patient: Fortino Dennis           : 1950  Visit Date: 2025  Referring practitioner: Alli Card Jr*  Date of Initial Visit: Type: THERAPY  Episode: lymphedema    Visit Diagnoses:    ICD-10-CM ICD-9-CM   1. Lymphedema  I89.0 457.1   2. History of pharyngeal cancer  Z85.819 V10.02          Clinical Progress: unchanged  Home Program Compliance: Yes but has not yet contacted [facility] about getting the [facility]itouch pump   Progress toward previous goals: Partially Met  Prognosis to achieve goals: good    Objective     Assessment & Plan       Assessment  Assessment details: Patient is working on the stretching for his neck, MLD, and wearing compression at home with the chip bag.  Patient is not going to be able to get the Flexitouch pump through Medicare, he needs to get set up with the [facility] again so he can try to get the Flexitouch pump.  We are going to see patient again in 1 month and that time he will work on getting set up with the [facility] and talking to them about the Flexitouch pump, as the Flexitouch pump is normally covered by the [facility].    Plan  Therapy options: will be seen for skilled therapy services  Frequency: 1x month  Treatment plan discussed with: PTA        Anticipated CPT codes: Therapeutic Exercise 58307, Manual Therapy 14270, and Self Care/Home Management 88883    I reviewed the treatment and goals with Sultana Hamlin and agree with the POC.    SIGNATURE: Ellen Rosenbaum, PT, DPT, CLT-JULIA, License #: [id number]  Electronically Signed on 2025

## 2025-06-26 ENCOUNTER — HOSPITAL ENCOUNTER (OUTPATIENT)
Dept: PHYSICAL THERAPY | Facility: HOSPITAL | Age: 75
Setting detail: THERAPIES SERIES
Discharge: HOME | End: 2025-06-26
Payer: MEDICARE

## 2025-06-26 DIAGNOSIS — Z85.819 HISTORY OF PHARYNGEAL CANCER: ICD-10-CM

## 2025-06-26 DIAGNOSIS — I89.0 LYMPHEDEMA: Primary | ICD-10-CM

## 2025-06-26 PROCEDURE — 97140 MANUAL THERAPY 1/> REGIONS: CPT

## 2025-06-26 NOTE — THERAPY TREATMENT NOTE
Physical Therapy Treatment Note and 30 Day Progress Note  [address] [place] [address]    Patient: Fortino Dennis                                                 Visit Date: 2025  :     1950    Referring practitioner:    Alli Card Jr*  Date of Initial Visit:          Type: THERAPY  Episode: lymphedema        Visit Diagnoses:    ICD-10-CM ICD-9-CM   1. Lymphedema  I89.0 457.1   2. History of pharyngeal cancer  Z85.819 V10.02       SUBJECTIVE     Subjective: He has not been able to get in contact with the [facility] yet, he has been working on the MLD some and wearing the scar strip some.  He has also been wearing the compression garment some.     PAIN: 0/10         OBJECTIVE     Objective    Lymphedema       Row Name 25 1100             Subjective Pain    Able to rate subjective pain? yes  -AL      Pre-Treatment Pain Level 0  -AL      Post-Treatment Pain Level 0  -AL         Lymphedema Measurements    Measurement Type(s) Circumferential  -AL      Circumferential Areas Neck  -AL         Head Circumferential (cm)    Measurement Location 1 Upper lip  -AL      Head 1 46.2 cm  -AL      Measurement Location 2 Lower lip  -AL      Head 2 44.3 cm  -AL      Head Circumferential Total 90.5 cm  -AL         Neck Circumferential (cm)    Measurement Location 1 Sarabjit's apple  -AL      Neck 1 34 cm  After the MLD 33.5 cm  -AL      Neck Circumferential Total 34 cm  -AL         Manual Lymphatic Drainage    Manual Lymphatic Drainage initial sequence;head/neck treatment;opened regional lymph nodes  -AL      Initial Sequence full neck;abdomen;diaphragmatic breathing  -AL      Abdomen superficial  -AL      Diaphragmatic Breathing x 9 with abdomen  -AL      Opened Regional Lymph Nodes axillary  -AL      Axillary right;left  -AL      Head/Neck Treatment pre-auricular nodes;post-auricular nodes;occipital nodes;sublingual nodes;full/complex MLD   -AL      Manual Lymphatic Drainage Comments Used the hand-held massager over the anterior neck.  -AL      Manual Therapy 45  -AL         Compression/Skin Care    Compression/Skin Care compression garment  -AL      Compression/Skin Care Comments Wear compression  -AL                User Key  (r) = Recorded By, (t) = Taken By, (c) = Cosigned By      Initials Name Provider Type    Pastora Serrano, PTA, CLT-JULIA Physical Therapist Assistant                             Therapy Education/Self Care 26791   Education offered today Wear the compression garment with the pit pack.  [facility]   Medbridge Code    Ongoing HEP   Cont self massage and stretches as well as MLD   Timed Minutes        Total Timed Treatment:     45   mins  Total Time of Visit:             45   mins         ASSESSMENT/PLAN     GOALS  Goals                                          Progress Note due by 7/26/25                                                      Recert due by 7/28/2025   STG by: 4 weeks Comments Date Status   Patient will have a good basic understanding of lymphedema and its suggested risk reduction practices Continue to educate each treatment.  He has a good understanding. 10/23 Met/Ongoing   Patient will be independent with remedial HEP for lymphedema Patient is working on stretching at home 5/27/2025 Met   Patient will be independent with self MLD for lymphedema He works on IASTM with the hand held massager, he will do follow up MLD. 10/23 Met             LTG by: 8 Weeks         Patient will have appropriate compression garments for lymphedema maintenance He now has a compression garment he wears daily at home. 4/29/2025 Met   Patient will have no sign or symptoms of infection He has two open areas anterior neck from shaving today. 6/26/25 Ongoing   Patient will be independent with comprehensive maintenance program for lymphedema She needs to get in [facility] with the [facility] about the [facility]ouch pump 6/26/2025 Ongoing                                          Assessment/Plan      ASSESSMENT: patient has been working on the MLD and wearing the compression but he knows he needs to work on it more.  Patient has the knowledge as well as the compression garment to use at home, he also has a hand-held massager that he can use on the anterior neck.  After I did the MLD today and use the hand-held massager the neck measurement at the Navarrete apple went down to half a centimeter.  Stressed importance of working on complete decongestive therapy daily to help move the fluid out of the anterior neck.  Patient has not contacted the [facility] yet about the [facility]itouch pump, this would be something that would be beneficial to patient but he has to become established with the [facility] again.  Patient and I did discuss that he will have 1 more treatment and then we will discharge patient.      PLAN: Will see patient 1 more treatment and then will D/C.      SIGNATURE: Pastora Hamlin PTA, BRIAN FARR License #: [id number]  Electronically Signed on 6/26/2025        [facility]  [place] [address]  [phone]

## 2025-06-27 NOTE — THERAPY TREATMENT NOTE
Progress Note Addendum    Patient: Fortino Dennis           : 1950  Visit Date: 2025  Referring practitioner: Alli Card Jr*  Date of Initial Visit: Type: THERAPY  Episode: lymphedema    Visit Diagnoses:    ICD-10-CM ICD-9-CM   1. Lymphedema  I89.0 457.1   2. History of pharyngeal cancer  Z85.819 V10.02          Clinical Progress: improved  Home Program Compliance: Yes  Progress toward previous goals: Partially Met  Prognosis to achieve goals: good    Objective     Assessment & Plan       Assessment  Impairments: lacks appropriate home exercise program   Other impairment: edema  Prognosis: good    Plan  Therapy options: will be seen for skilled therapy services  Planned therapy interventions: manual therapy, soft tissue mobilization, stretching, therapeutic activities, home exercise program and functional ROM exercises  Frequency: 1x month  Treatment plan discussed with: MARIPOSA        Anticipated CPT codes: Therapeutic Exercise 32777, Manual Therapy 25858, and Self Care/Home Management 01687    I reviewed the treatment and goals with Sultana Hamlin PTA and agree with the POC.    SIGNATURE: Rory Munoz PT, License #: [id number]  Electronically Signed on 2025

## 2025-07-11 ENCOUNTER — HOSPITAL ENCOUNTER (OUTPATIENT)
Dept: PHYSICAL THERAPY | Facility: HOSPITAL | Age: 75
Setting detail: THERAPIES SERIES
Discharge: HOME | End: 2025-07-11
Payer: MEDICARE

## 2025-07-11 DIAGNOSIS — Z85.819 HISTORY OF PHARYNGEAL CANCER: ICD-10-CM

## 2025-07-11 DIAGNOSIS — I89.0 LYMPHEDEMA: Primary | ICD-10-CM

## 2025-07-11 PROCEDURE — 97140 MANUAL THERAPY 1/> REGIONS: CPT

## 2025-07-11 NOTE — THERAPY TREATMENT NOTE
Physical Therapy Treatment Note and Discharge Note  [address] [place] [address]    Patient: Fortino Dennis                                                 Visit Date: 2025  :     1950    Referring practitioner:    Alli Card Jr*  Date of Initial Visit:          Type: THERAPY  Episode: lymphedema        Visit Diagnoses:    ICD-10-CM ICD-9-CM   1. Lymphedema  I89.0 457.1   2. History of pharyngeal cancer  Z85.819 V10.02       SUBJECTIVE     Subjective: He is working on the MLD at home.  He has not been able to get in contact with the [facility] yet.  The scar strip has lost it's stickiness so he has not used it.    PAIN: 0/10         OBJECTIVE     Objective    Lymphedema       Row Name 25 1230             Subjective Pain    Able to rate subjective pain? yes  -AL      Pre-Treatment Pain Level 0  -AL      Post-Treatment Pain Level 0  -AL         Lymphedema Measurements    Measurement Type(s) --  -AL      Circumferential Areas --  -AL         Head Circumferential (cm)    Measurement Location 1 --  -AL      Measurement Location 2 --  -AL         Neck Circumferential (cm)    Measurement Location 1 --  -AL         Manual Lymphatic Drainage    Manual Lymphatic Drainage initial sequence;head/neck treatment;opened regional lymph nodes  -AL      Initial Sequence full neck;abdomen;diaphragmatic breathing  -AL      Abdomen superficial  -AL      Diaphragmatic Breathing x 9 with abdomen  -AL      Opened Regional Lymph Nodes axillary  -AL      Axillary right;left  -AL      Head/Neck Treatment pre-auricular nodes;post-auricular nodes;occipital nodes;sublingual nodes;full/complex MLD  -AL      Manual Lymphatic Drainage Comments Used the hand-held massager over the anterior neck.  -AL      Manual Therapy 45  -AL         Compression/Skin Care    Compression/Skin Care compression garment  -AL      Compression/Skin Care Comments Wear  compression  -AL                User Key  (r) = Recorded By, (t) = Taken By, (c) = Cosigned By      Initials Name Provider Type    Pastora Serrano, MARIPOSA, CHIKA Physical Therapist Assistant                        Therapy Education/Self Care 79771   Education offered today Wear the compression garment with the pit pack.  Get [facility] with the [facility] and inquire about a Flexitouch pump.  Purchase more scar strips from "[employer]" or the local pharmacy.   Medbridge Code    Ongoing HEP   Cont self massage and stretches as well as MLD   Timed Minutes        Total Timed Treatment:     45   mins  Total Time of Visit:             45   mins         ASSESSMENT/PLAN     GOALS  Goals                                          Progress Note due by 7/26/25                                                      Recert due by 7/28/2025   STG by: 4 weeks Comments Date Status   Patient will have a good basic understanding of lymphedema and its suggested risk reduction practices Continue to educate each treatment.  He has a good understanding. 10/23 Met   Patient will be independent with remedial HEP for lymphedema Patient is working on stretching at home 5/27/2025 Met   Patient will be independent with self MLD for lymphedema He works on [employer] with the hand held massager, he will do follow up MLD. 10/23 Met             LTG by: 8 Weeks         Patient will have appropriate compression garments for lymphedema maintenance He now has a compression garment he wears daily at home. 4/29/2025 Met   Patient will have no sign or symptoms of infection Has a few open areas around his job lined and also has very minimal weeping from the scar proximal to the Sarabjit's apple with the MLD. 7/11/2025 Ongoing   Patient will be independent with comprehensive maintenance program for lymphedema He needs to get in touch with the [facility] about the Flexitouch pump 7/11/2025 Ongoing                                         Assessment/Plan      ASSESSMENT: Patient has met  all but 2 of his goals.  Patient would benefit from a Flexitouch pump but this is not covered by his insurance.  There is a possibility that the [facility] would cover the Flexitouch pump, patient has not gotten in touch with the [facility] yet about being an established patient.  I did stressed to patient the importance of getting in contact with the [facility] and asking about the Flexitouch pump.  Patient has not had any changes as far as progress or regression, he will work on his home program.      PLAN: Will DC this patient  DISCHARGE SUMMARY   Discharge date 7/11/2025   Dates of this episode 7/12/24 through 7/11/25   Number of visits on this episode 45   Reason for discharge maximum functional potential achieved  at baseline functional level   Outcomes achieved Refer to the goals table for specifics on goals   Discharge plan Continue with current home exercise program as instructed   Summary of care Patient will need to get in touch with the [facility] to see if the Flexitouch pump is covered for the head and neck.  Patient will continue to work on his HEP, MLD, wearing the compression, and using the hand-held massager over the anterior neck.   Discharge instruction See Education Table        SIGNATURE: Pastora Hamlin PTA, HELENBRIAN DUMONT License #: [id number]  Electronically Signed on 7/11/2025        Gillian Kate  [place] [address]  [phone]

## 2025-08-05 ENCOUNTER — NURSE TRIAGE (OUTPATIENT)
Dept: CALL CENTER | Facility: HOSPITAL | Age: 75
End: 2025-08-05
Payer: MEDICARE

## 2025-08-05 DIAGNOSIS — G47.00 INSOMNIA, UNSPECIFIED TYPE: ICD-10-CM

## 2025-08-06 DIAGNOSIS — G47.00 INSOMNIA, UNSPECIFIED TYPE: ICD-10-CM

## 2025-08-06 RX ORDER — ZOLPIDEM TARTRATE 10 MG/1
10 TABLET ORAL NIGHTLY PRN
Qty: 30 TABLET | Refills: 2 | Status: SHIPPED | OUTPATIENT
Start: 2025-08-06

## 2025-08-06 RX ORDER — ZOLPIDEM TARTRATE 10 MG/1
10 TABLET ORAL NIGHTLY PRN
Qty: 30 TABLET | Refills: 2 | OUTPATIENT
Start: 2025-08-06

## (undated) DEVICE — GLV SURG SENSICARE W/ALOE PF LF 6.5 STRL

## (undated) DEVICE — BNDR ABD 4PANEL12IN 30TO45IN

## (undated) DEVICE — GLV SURG DERMASSURE GRN LF PF 7.0